# Patient Record
Sex: FEMALE | Race: WHITE | NOT HISPANIC OR LATINO | Employment: OTHER | URBAN - METROPOLITAN AREA
[De-identification: names, ages, dates, MRNs, and addresses within clinical notes are randomized per-mention and may not be internally consistent; named-entity substitution may affect disease eponyms.]

---

## 2017-06-10 ENCOUNTER — COMMUNICATION - HEALTHEAST (OUTPATIENT)
Dept: FAMILY MEDICINE | Facility: CLINIC | Age: 77
End: 2017-06-10

## 2017-07-10 ENCOUNTER — HOSPITAL ENCOUNTER (OUTPATIENT)
Dept: MAMMOGRAPHY | Facility: CLINIC | Age: 77
Discharge: HOME | End: 2017-07-10
Attending: FAMILY MEDICINE

## 2017-07-10 DIAGNOSIS — Z12.31 VISIT FOR SCREENING MAMMOGRAM: ICD-10-CM

## 2017-07-24 ENCOUNTER — OFFICE VISIT - HEALTHEAST (OUTPATIENT)
Dept: FAMILY MEDICINE | Facility: CLINIC | Age: 77
End: 2017-07-24

## 2017-07-24 ENCOUNTER — COMMUNICATION - HEALTHEAST (OUTPATIENT)
Dept: FAMILY MEDICINE | Facility: CLINIC | Age: 77
End: 2017-07-24

## 2017-07-24 DIAGNOSIS — E78.5 HYPERLIPIDEMIA: ICD-10-CM

## 2017-07-24 DIAGNOSIS — M89.9 DISORDER OF BONE AND CARTILAGE: ICD-10-CM

## 2017-07-24 DIAGNOSIS — Z00.00 ROUTINE GENERAL MEDICAL EXAMINATION AT A HEALTH CARE FACILITY: ICD-10-CM

## 2017-07-24 DIAGNOSIS — N30.90 CYSTITIS: ICD-10-CM

## 2017-07-24 DIAGNOSIS — R73.03 PREDIABETES: ICD-10-CM

## 2017-07-24 DIAGNOSIS — M94.9 DISORDER OF BONE AND CARTILAGE: ICD-10-CM

## 2017-07-24 LAB
CHOLEST SERPL-MCNC: 176 MG/DL
FASTING STATUS PATIENT QL REPORTED: YES
HBA1C MFR BLD: 5.5 % (ref 3.5–6)
HDLC SERPL-MCNC: 48 MG/DL
LDLC SERPL CALC-MCNC: 113 MG/DL
TRIGL SERPL-MCNC: 73 MG/DL

## 2017-07-24 ASSESSMENT — MIFFLIN-ST. JEOR: SCORE: 1191.18

## 2017-07-25 ENCOUNTER — COMMUNICATION - HEALTHEAST (OUTPATIENT)
Dept: SCHEDULING | Facility: CLINIC | Age: 77
End: 2017-07-25

## 2017-07-26 ENCOUNTER — COMMUNICATION - HEALTHEAST (OUTPATIENT)
Dept: FAMILY MEDICINE | Facility: CLINIC | Age: 77
End: 2017-07-26

## 2017-07-26 ENCOUNTER — RECORDS - HEALTHEAST (OUTPATIENT)
Dept: ADMINISTRATIVE | Facility: OTHER | Age: 77
End: 2017-07-26

## 2017-07-26 ENCOUNTER — COMMUNICATION - HEALTHEAST (OUTPATIENT)
Dept: SCHEDULING | Facility: CLINIC | Age: 77
End: 2017-07-26

## 2017-07-27 ENCOUNTER — OFFICE VISIT - HEALTHEAST (OUTPATIENT)
Dept: FAMILY MEDICINE | Facility: CLINIC | Age: 77
End: 2017-07-27

## 2017-07-27 DIAGNOSIS — N39.0 URINARY TRACT INFECTION, SITE UNSPECIFIED: ICD-10-CM

## 2017-07-27 ASSESSMENT — MIFFLIN-ST. JEOR: SCORE: 1201.61

## 2018-04-13 ENCOUNTER — COMMUNICATION - HEALTHEAST (OUTPATIENT)
Dept: FAMILY MEDICINE | Facility: CLINIC | Age: 78
End: 2018-04-13

## 2018-04-18 ENCOUNTER — AMBULATORY - HEALTHEAST (OUTPATIENT)
Dept: FAMILY MEDICINE | Facility: CLINIC | Age: 78
End: 2018-04-18

## 2018-04-18 DIAGNOSIS — I83.899: ICD-10-CM

## 2018-05-15 ENCOUNTER — RECORDS - HEALTHEAST (OUTPATIENT)
Dept: ADMINISTRATIVE | Facility: OTHER | Age: 78
End: 2018-05-15

## 2018-06-05 ENCOUNTER — COMMUNICATION - HEALTHEAST (OUTPATIENT)
Dept: VASCULAR SURGERY | Facility: CLINIC | Age: 78
End: 2018-06-05

## 2018-06-05 ENCOUNTER — RECORDS - HEALTHEAST (OUTPATIENT)
Dept: VASCULAR ULTRASOUND | Facility: CLINIC | Age: 78
End: 2018-06-05

## 2018-06-05 ENCOUNTER — AMBULATORY - HEALTHEAST (OUTPATIENT)
Dept: VASCULAR SURGERY | Facility: CLINIC | Age: 78
End: 2018-06-05

## 2018-06-05 ENCOUNTER — OFFICE VISIT - HEALTHEAST (OUTPATIENT)
Dept: VASCULAR SURGERY | Facility: CLINIC | Age: 78
End: 2018-06-05

## 2018-06-05 DIAGNOSIS — I87.2 VENOUS INSUFFICIENCY OF BOTH LOWER EXTREMITIES: ICD-10-CM

## 2018-06-05 DIAGNOSIS — I83.811 VARICOSE VEINS OF RIGHT LOWER EXTREMITY WITH PAIN: ICD-10-CM

## 2018-06-05 DIAGNOSIS — I83.899 VARICOSE VEINS OF UNSPECIFIED LOWER EXTREMITY WITH OTHER COMPLICATIONS: ICD-10-CM

## 2018-06-05 DIAGNOSIS — I87.2 VENOUS INSUFFICIENCY (CHRONIC) (PERIPHERAL): ICD-10-CM

## 2018-06-05 DIAGNOSIS — I83.812 VARICOSE VEINS OF LEFT LOWER EXTREMITY WITH PAIN: ICD-10-CM

## 2018-06-05 DIAGNOSIS — I83.899 SYMPTOMATIC VARICOSE VEINS: ICD-10-CM

## 2018-07-11 ENCOUNTER — HOSPITAL ENCOUNTER (OUTPATIENT)
Dept: MAMMOGRAPHY | Facility: CLINIC | Age: 78
Discharge: HOME | End: 2018-07-11
Attending: FAMILY MEDICINE

## 2018-07-11 DIAGNOSIS — Z12.31 VISIT FOR SCREENING MAMMOGRAM: ICD-10-CM

## 2018-07-20 ENCOUNTER — COMMUNICATION - HEALTHEAST (OUTPATIENT)
Dept: VASCULAR SURGERY | Facility: CLINIC | Age: 78
End: 2018-07-20

## 2018-07-29 ENCOUNTER — COMMUNICATION - HEALTHEAST (OUTPATIENT)
Dept: FAMILY MEDICINE | Facility: CLINIC | Age: 78
End: 2018-07-29

## 2018-07-29 DIAGNOSIS — E78.5 HYPERLIPIDEMIA: ICD-10-CM

## 2018-08-06 ENCOUNTER — OFFICE VISIT - HEALTHEAST (OUTPATIENT)
Dept: FAMILY MEDICINE | Facility: CLINIC | Age: 78
End: 2018-08-06

## 2018-08-06 ENCOUNTER — COMMUNICATION - HEALTHEAST (OUTPATIENT)
Dept: FAMILY MEDICINE | Facility: CLINIC | Age: 78
End: 2018-08-06

## 2018-08-06 DIAGNOSIS — Z00.00 ROUTINE GENERAL MEDICAL EXAMINATION AT A HEALTH CARE FACILITY: ICD-10-CM

## 2018-08-06 DIAGNOSIS — R35.0 URINARY FREQUENCY: ICD-10-CM

## 2018-08-06 DIAGNOSIS — I83.90 VARICOSE VEIN OF LEG: ICD-10-CM

## 2018-08-06 DIAGNOSIS — M94.9 DISORDER OF BONE AND CARTILAGE: ICD-10-CM

## 2018-08-06 DIAGNOSIS — M89.9 DISORDER OF BONE AND CARTILAGE: ICD-10-CM

## 2018-08-06 DIAGNOSIS — R73.03 PREDIABETES: ICD-10-CM

## 2018-08-06 DIAGNOSIS — E78.5 HYPERLIPIDEMIA, UNSPECIFIED HYPERLIPIDEMIA TYPE: ICD-10-CM

## 2018-08-06 DIAGNOSIS — E78.5 HYPERLIPIDEMIA: ICD-10-CM

## 2018-08-06 LAB
ALBUMIN SERPL-MCNC: 4 G/DL (ref 3.5–5)
ALBUMIN UR-MCNC: NEGATIVE MG/DL
ANION GAP SERPL CALCULATED.3IONS-SCNC: 9 MMOL/L (ref 5–18)
APPEARANCE UR: CLEAR
BACTERIA #/AREA URNS HPF: ABNORMAL HPF
BILIRUB UR QL STRIP: NEGATIVE
BUN SERPL-MCNC: 19 MG/DL (ref 8–28)
CALCIUM SERPL-MCNC: 9.6 MG/DL (ref 8.5–10.5)
CHLORIDE BLD-SCNC: 108 MMOL/L (ref 98–107)
CHOLEST SERPL-MCNC: 165 MG/DL
CO2 SERPL-SCNC: 27 MMOL/L (ref 22–31)
COLOR UR AUTO: YELLOW
CREAT SERPL-MCNC: 0.88 MG/DL (ref 0.6–1.1)
FASTING STATUS PATIENT QL REPORTED: YES
GFR SERPL CREATININE-BSD FRML MDRD: >60 ML/MIN/1.73M2
GLUCOSE BLD-MCNC: 88 MG/DL (ref 70–125)
GLUCOSE UR STRIP-MCNC: NEGATIVE MG/DL
HBA1C MFR BLD: 5.8 % (ref 3.5–6)
HDLC SERPL-MCNC: 54 MG/DL
HGB UR QL STRIP: ABNORMAL
KETONES UR STRIP-MCNC: NEGATIVE MG/DL
LDLC SERPL CALC-MCNC: 98 MG/DL
LEUKOCYTE ESTERASE UR QL STRIP: ABNORMAL
MUCOUS THREADS #/AREA URNS LPF: ABNORMAL LPF
NITRATE UR QL: NEGATIVE
PH UR STRIP: 6.5 [PH] (ref 5–8)
PHOSPHATE SERPL-MCNC: 3.1 MG/DL (ref 2.5–4.5)
POTASSIUM BLD-SCNC: 4.1 MMOL/L (ref 3.5–5)
RBC #/AREA URNS AUTO: ABNORMAL HPF
SODIUM SERPL-SCNC: 144 MMOL/L (ref 136–145)
SP GR UR STRIP: 1.01 (ref 1–1.03)
SQUAMOUS #/AREA URNS AUTO: ABNORMAL LPF
TRANS CELLS #/AREA URNS HPF: ABNORMAL LPF
TRIGL SERPL-MCNC: 64 MG/DL
UROBILINOGEN UR STRIP-ACNC: ABNORMAL
WBC #/AREA URNS AUTO: ABNORMAL HPF

## 2018-08-06 ASSESSMENT — MIFFLIN-ST. JEOR: SCORE: 1178.02

## 2018-08-07 LAB
25(OH)D3 SERPL-MCNC: 44 NG/ML (ref 30–80)
25(OH)D3 SERPL-MCNC: 44 NG/ML (ref 30–80)

## 2018-08-09 ENCOUNTER — COMMUNICATION - HEALTHEAST (OUTPATIENT)
Dept: VASCULAR SURGERY | Facility: CLINIC | Age: 78
End: 2018-08-09

## 2018-08-09 LAB — BACTERIA SPEC CULT: ABNORMAL

## 2018-08-10 ENCOUNTER — COMMUNICATION - HEALTHEAST (OUTPATIENT)
Dept: VASCULAR SURGERY | Facility: CLINIC | Age: 78
End: 2018-08-10

## 2018-08-22 ENCOUNTER — COMMUNICATION - HEALTHEAST (OUTPATIENT)
Dept: VASCULAR SURGERY | Facility: CLINIC | Age: 78
End: 2018-08-22

## 2018-08-24 ENCOUNTER — RECORDS - HEALTHEAST (OUTPATIENT)
Dept: ADMINISTRATIVE | Facility: OTHER | Age: 78
End: 2018-08-24

## 2018-08-24 ENCOUNTER — RECORDS - HEALTHEAST (OUTPATIENT)
Dept: BONE DENSITY | Facility: CLINIC | Age: 78
End: 2018-08-24

## 2018-08-24 DIAGNOSIS — M89.9 DISORDER OF BONE, UNSPECIFIED: ICD-10-CM

## 2018-08-24 DIAGNOSIS — M94.9 DISORDER OF CARTILAGE, UNSPECIFIED: ICD-10-CM

## 2018-08-28 ENCOUNTER — RECORDS - HEALTHEAST (OUTPATIENT)
Dept: VASCULAR ULTRASOUND | Facility: CLINIC | Age: 78
End: 2018-08-28

## 2018-08-28 ENCOUNTER — RECORDS - HEALTHEAST (OUTPATIENT)
Dept: ADMINISTRATIVE | Facility: OTHER | Age: 78
End: 2018-08-28

## 2018-08-28 DIAGNOSIS — I83.812 VARICOSE VEINS OF LEFT LOWER EXTREMITY WITH PAIN: ICD-10-CM

## 2018-08-28 DIAGNOSIS — I87.2 VENOUS INSUFFICIENCY (CHRONIC) (PERIPHERAL): ICD-10-CM

## 2018-08-28 DIAGNOSIS — I83.899 VARICOSE VEINS OF UNSPECIFIED LOWER EXTREMITY WITH OTHER COMPLICATIONS: ICD-10-CM

## 2018-08-30 ENCOUNTER — RECORDS - HEALTHEAST (OUTPATIENT)
Dept: VASCULAR ULTRASOUND | Facility: CLINIC | Age: 78
End: 2018-08-30

## 2018-08-30 DIAGNOSIS — I87.2 VENOUS INSUFFICIENCY (CHRONIC) (PERIPHERAL): ICD-10-CM

## 2018-08-30 DIAGNOSIS — I83.899 VARICOSE VEINS OF UNSPECIFIED LOWER EXTREMITY WITH OTHER COMPLICATIONS: ICD-10-CM

## 2018-09-19 ENCOUNTER — OFFICE VISIT - HEALTHEAST (OUTPATIENT)
Dept: VASCULAR SURGERY | Facility: CLINIC | Age: 78
End: 2018-09-19

## 2018-09-19 DIAGNOSIS — I83.899 SYMPTOMATIC VARICOSE VEINS: ICD-10-CM

## 2018-09-19 DIAGNOSIS — I87.2 VENOUS INSUFFICIENCY OF BOTH LOWER EXTREMITIES: ICD-10-CM

## 2018-10-01 ENCOUNTER — OFFICE VISIT - HEALTHEAST (OUTPATIENT)
Dept: FAMILY MEDICINE | Facility: CLINIC | Age: 78
End: 2018-10-01

## 2018-10-01 DIAGNOSIS — M94.9 DISORDER OF BONE AND CARTILAGE: ICD-10-CM

## 2018-10-01 DIAGNOSIS — E78.5 HYPERLIPIDEMIA, UNSPECIFIED HYPERLIPIDEMIA TYPE: ICD-10-CM

## 2018-10-01 DIAGNOSIS — F41.9 ANXIETY: ICD-10-CM

## 2018-10-01 DIAGNOSIS — M89.9 DISORDER OF BONE AND CARTILAGE: ICD-10-CM

## 2018-11-05 ENCOUNTER — RECORDS - HEALTHEAST (OUTPATIENT)
Dept: ADMINISTRATIVE | Facility: OTHER | Age: 78
End: 2018-11-05

## 2019-05-06 ENCOUNTER — OFFICE VISIT - HEALTHEAST (OUTPATIENT)
Dept: FAMILY MEDICINE | Facility: CLINIC | Age: 79
End: 2019-05-06

## 2019-05-06 DIAGNOSIS — M79.662 PAIN OF LEFT LOWER LEG: ICD-10-CM

## 2019-05-06 DIAGNOSIS — M25.511 RIGHT SHOULDER PAIN, UNSPECIFIED CHRONICITY: ICD-10-CM

## 2019-05-06 ASSESSMENT — MIFFLIN-ST. JEOR: SCORE: 1207.51

## 2019-05-13 ENCOUNTER — AMBULATORY - HEALTHEAST (OUTPATIENT)
Dept: OTHER | Facility: CLINIC | Age: 79
End: 2019-05-13

## 2019-05-15 ENCOUNTER — RECORDS - HEALTHEAST (OUTPATIENT)
Dept: ADMINISTRATIVE | Facility: OTHER | Age: 79
End: 2019-05-15

## 2019-06-24 ENCOUNTER — OFFICE VISIT - HEALTHEAST (OUTPATIENT)
Dept: FAMILY MEDICINE | Facility: CLINIC | Age: 79
End: 2019-06-24

## 2019-06-24 DIAGNOSIS — G89.29 CHRONIC RIGHT SHOULDER PAIN: ICD-10-CM

## 2019-06-24 DIAGNOSIS — M25.511 CHRONIC RIGHT SHOULDER PAIN: ICD-10-CM

## 2019-07-01 ENCOUNTER — OFFICE VISIT - HEALTHEAST (OUTPATIENT)
Dept: PHYSICAL THERAPY | Facility: REHABILITATION | Age: 79
End: 2019-07-01

## 2019-07-01 DIAGNOSIS — G89.29 CHRONIC RIGHT SHOULDER PAIN: ICD-10-CM

## 2019-07-01 DIAGNOSIS — M25.511 CHRONIC RIGHT SHOULDER PAIN: ICD-10-CM

## 2019-07-08 ENCOUNTER — OFFICE VISIT - HEALTHEAST (OUTPATIENT)
Dept: PHYSICAL THERAPY | Facility: REHABILITATION | Age: 79
End: 2019-07-08

## 2019-07-08 DIAGNOSIS — G89.29 CHRONIC RIGHT SHOULDER PAIN: ICD-10-CM

## 2019-07-08 DIAGNOSIS — M25.511 CHRONIC RIGHT SHOULDER PAIN: ICD-10-CM

## 2019-07-10 ENCOUNTER — OFFICE VISIT - HEALTHEAST (OUTPATIENT)
Dept: PHYSICAL THERAPY | Facility: REHABILITATION | Age: 79
End: 2019-07-10

## 2019-07-10 DIAGNOSIS — G89.29 CHRONIC RIGHT SHOULDER PAIN: ICD-10-CM

## 2019-07-10 DIAGNOSIS — M25.511 CHRONIC RIGHT SHOULDER PAIN: ICD-10-CM

## 2019-07-15 ENCOUNTER — HOSPITAL ENCOUNTER (OUTPATIENT)
Dept: MAMMOGRAPHY | Facility: CLINIC | Age: 79
Discharge: HOME | End: 2019-07-15
Attending: FAMILY MEDICINE

## 2019-07-15 DIAGNOSIS — Z12.31 VISIT FOR SCREENING MAMMOGRAM: ICD-10-CM

## 2019-08-05 ENCOUNTER — OFFICE VISIT - HEALTHEAST (OUTPATIENT)
Dept: PHYSICAL THERAPY | Facility: REHABILITATION | Age: 79
End: 2019-08-05

## 2019-08-05 DIAGNOSIS — G89.29 CHRONIC RIGHT SHOULDER PAIN: ICD-10-CM

## 2019-08-05 DIAGNOSIS — M25.511 CHRONIC RIGHT SHOULDER PAIN: ICD-10-CM

## 2019-08-07 ENCOUNTER — COMMUNICATION - HEALTHEAST (OUTPATIENT)
Dept: FAMILY MEDICINE | Facility: CLINIC | Age: 79
End: 2019-08-07

## 2019-08-08 ENCOUNTER — RECORDS - HEALTHEAST (OUTPATIENT)
Dept: ADMINISTRATIVE | Facility: OTHER | Age: 79
End: 2019-08-08

## 2019-08-12 ENCOUNTER — RECORDS - HEALTHEAST (OUTPATIENT)
Dept: GENERAL RADIOLOGY | Facility: CLINIC | Age: 79
End: 2019-08-12

## 2019-08-12 ENCOUNTER — OFFICE VISIT - HEALTHEAST (OUTPATIENT)
Dept: FAMILY MEDICINE | Facility: CLINIC | Age: 79
End: 2019-08-12

## 2019-08-12 DIAGNOSIS — N39.41 URGENCY INCONTINENCE: ICD-10-CM

## 2019-08-12 DIAGNOSIS — R73.03 PREDIABETES: ICD-10-CM

## 2019-08-12 DIAGNOSIS — M25.511 CHRONIC RIGHT SHOULDER PAIN: ICD-10-CM

## 2019-08-12 DIAGNOSIS — M94.9 DISORDER OF BONE AND CARTILAGE: ICD-10-CM

## 2019-08-12 DIAGNOSIS — Z00.00 ROUTINE GENERAL MEDICAL EXAMINATION AT A HEALTH CARE FACILITY: ICD-10-CM

## 2019-08-12 DIAGNOSIS — M89.9 DISORDER OF BONE AND CARTILAGE: ICD-10-CM

## 2019-08-12 DIAGNOSIS — M25.511 PAIN IN RIGHT SHOULDER: ICD-10-CM

## 2019-08-12 DIAGNOSIS — E78.5 HYPERLIPIDEMIA, UNSPECIFIED HYPERLIPIDEMIA TYPE: ICD-10-CM

## 2019-08-12 DIAGNOSIS — G89.29 CHRONIC RIGHT SHOULDER PAIN: ICD-10-CM

## 2019-08-12 DIAGNOSIS — G89.29 OTHER CHRONIC PAIN: ICD-10-CM

## 2019-08-12 LAB
ALBUMIN SERPL-MCNC: 3.9 G/DL (ref 3.5–5)
ANION GAP SERPL CALCULATED.3IONS-SCNC: 6 MMOL/L (ref 5–18)
BUN SERPL-MCNC: 19 MG/DL (ref 8–28)
CALCIUM SERPL-MCNC: 9.8 MG/DL (ref 8.5–10.5)
CHLORIDE BLD-SCNC: 108 MMOL/L (ref 98–107)
CHOLEST SERPL-MCNC: 167 MG/DL
CO2 SERPL-SCNC: 31 MMOL/L (ref 22–31)
CREAT SERPL-MCNC: 0.93 MG/DL (ref 0.6–1.1)
FASTING STATUS PATIENT QL REPORTED: YES
GFR SERPL CREATININE-BSD FRML MDRD: 58 ML/MIN/1.73M2
GLUCOSE BLD-MCNC: 93 MG/DL (ref 70–125)
HBA1C MFR BLD: 5.7 % (ref 3.5–6)
HDLC SERPL-MCNC: 57 MG/DL
LDLC SERPL CALC-MCNC: 99 MG/DL
PHOSPHATE SERPL-MCNC: 3.2 MG/DL (ref 2.5–4.5)
POTASSIUM BLD-SCNC: 4 MMOL/L (ref 3.5–5)
SODIUM SERPL-SCNC: 145 MMOL/L (ref 136–145)
TRIGL SERPL-MCNC: 53 MG/DL

## 2019-08-12 ASSESSMENT — MIFFLIN-ST. JEOR: SCORE: 1202.97

## 2019-08-14 ENCOUNTER — OFFICE VISIT - HEALTHEAST (OUTPATIENT)
Dept: PHYSICAL THERAPY | Facility: REHABILITATION | Age: 79
End: 2019-08-14

## 2019-08-14 DIAGNOSIS — G89.29 CHRONIC RIGHT SHOULDER PAIN: ICD-10-CM

## 2019-08-14 DIAGNOSIS — M25.511 CHRONIC RIGHT SHOULDER PAIN: ICD-10-CM

## 2019-08-21 ENCOUNTER — HOSPITAL ENCOUNTER (OUTPATIENT)
Dept: RADIOLOGY | Facility: CLINIC | Age: 79
Discharge: HOME | End: 2019-08-21
Attending: FAMILY MEDICINE

## 2019-08-21 ENCOUNTER — OFFICE VISIT - HEALTHEAST (OUTPATIENT)
Dept: FAMILY MEDICINE | Facility: CLINIC | Age: 79
End: 2019-08-21

## 2019-08-21 DIAGNOSIS — M25.562 ACUTE PAIN OF LEFT KNEE: ICD-10-CM

## 2019-08-22 ENCOUNTER — OFFICE VISIT - HEALTHEAST (OUTPATIENT)
Dept: FAMILY MEDICINE | Facility: CLINIC | Age: 79
End: 2019-08-22

## 2019-08-22 DIAGNOSIS — M17.10 ARTHRITIS OF KNEE: ICD-10-CM

## 2019-09-03 ENCOUNTER — COMMUNICATION - HEALTHEAST (OUTPATIENT)
Dept: FAMILY MEDICINE | Facility: CLINIC | Age: 79
End: 2019-09-03

## 2019-09-03 ENCOUNTER — AMBULATORY - HEALTHEAST (OUTPATIENT)
Dept: FAMILY MEDICINE | Facility: CLINIC | Age: 79
End: 2019-09-03

## 2019-09-03 DIAGNOSIS — M25.562 LEFT KNEE PAIN, UNSPECIFIED CHRONICITY: ICD-10-CM

## 2019-09-10 ENCOUNTER — COMMUNICATION - HEALTHEAST (OUTPATIENT)
Dept: FAMILY MEDICINE | Facility: CLINIC | Age: 79
End: 2019-09-10

## 2019-09-10 ENCOUNTER — AMBULATORY - HEALTHEAST (OUTPATIENT)
Dept: FAMILY MEDICINE | Facility: CLINIC | Age: 79
End: 2019-09-10

## 2019-09-10 ENCOUNTER — RECORDS - HEALTHEAST (OUTPATIENT)
Dept: ADMINISTRATIVE | Facility: OTHER | Age: 79
End: 2019-09-10

## 2019-09-10 ENCOUNTER — RECORDS - HEALTHEAST (OUTPATIENT)
Dept: BONE DENSITY | Facility: CLINIC | Age: 79
End: 2019-09-10

## 2019-09-10 ENCOUNTER — HOSPITAL ENCOUNTER (OUTPATIENT)
Dept: MRI IMAGING | Facility: CLINIC | Age: 79
Discharge: HOME | End: 2019-09-10
Attending: FAMILY MEDICINE

## 2019-09-10 DIAGNOSIS — N39.41 URGENCY INCONTINENCE: ICD-10-CM

## 2019-09-10 DIAGNOSIS — M94.9 DISORDER OF CARTILAGE, UNSPECIFIED: ICD-10-CM

## 2019-09-10 DIAGNOSIS — M89.9 DISORDER OF BONE, UNSPECIFIED: ICD-10-CM

## 2019-09-10 DIAGNOSIS — M25.562 LEFT KNEE PAIN, UNSPECIFIED CHRONICITY: ICD-10-CM

## 2019-09-10 DIAGNOSIS — M79.662 PAIN OF LEFT LOWER LEG: ICD-10-CM

## 2019-09-16 ENCOUNTER — RECORDS - HEALTHEAST (OUTPATIENT)
Dept: ADMINISTRATIVE | Facility: OTHER | Age: 79
End: 2019-09-16

## 2019-09-17 ENCOUNTER — COMMUNICATION - HEALTHEAST (OUTPATIENT)
Dept: FAMILY MEDICINE | Facility: CLINIC | Age: 79
End: 2019-09-17

## 2019-09-19 ENCOUNTER — RECORDS - HEALTHEAST (OUTPATIENT)
Dept: VASCULAR ULTRASOUND | Facility: CLINIC | Age: 79
End: 2019-09-19

## 2019-09-19 DIAGNOSIS — M89.8X6 OTHER SPECIFIED DISORDERS OF BONE, LOWER LEG: ICD-10-CM

## 2019-09-19 DIAGNOSIS — M79.669 PAIN IN UNSPECIFIED LOWER LEG: ICD-10-CM

## 2019-09-30 ENCOUNTER — AMBULATORY - HEALTHEAST (OUTPATIENT)
Dept: OTHER | Facility: CLINIC | Age: 79
End: 2019-09-30

## 2019-10-16 ENCOUNTER — AMBULATORY - HEALTHEAST (OUTPATIENT)
Dept: NURSING | Facility: CLINIC | Age: 79
End: 2019-10-16

## 2019-10-16 DIAGNOSIS — Z23 FLU VACCINE NEED: ICD-10-CM

## 2020-06-01 ENCOUNTER — RECORDS - HEALTHEAST (OUTPATIENT)
Dept: ADMINISTRATIVE | Facility: OTHER | Age: 80
End: 2020-06-01

## 2020-07-06 ENCOUNTER — OFFICE VISIT - HEALTHEAST (OUTPATIENT)
Dept: FAMILY MEDICINE | Facility: CLINIC | Age: 80
End: 2020-07-06

## 2020-07-06 DIAGNOSIS — R73.03 PREDIABETES: ICD-10-CM

## 2020-07-06 DIAGNOSIS — M89.9 DISORDER OF BONE AND CARTILAGE: ICD-10-CM

## 2020-07-06 DIAGNOSIS — E78.5 HYPERLIPIDEMIA, UNSPECIFIED HYPERLIPIDEMIA TYPE: ICD-10-CM

## 2020-07-06 DIAGNOSIS — N81.11 MIDLINE CYSTOCELE: ICD-10-CM

## 2020-07-06 DIAGNOSIS — M94.9 DISORDER OF BONE AND CARTILAGE: ICD-10-CM

## 2020-07-06 DIAGNOSIS — H90.3 BILATERAL SENSORINEURAL HEARING LOSS: ICD-10-CM

## 2020-07-06 LAB
ALBUMIN SERPL-MCNC: 3.9 G/DL (ref 3.5–5)
ANION GAP SERPL CALCULATED.3IONS-SCNC: 10 MMOL/L (ref 5–18)
BUN SERPL-MCNC: 23 MG/DL (ref 8–28)
CALCIUM SERPL-MCNC: 9.1 MG/DL (ref 8.5–10.5)
CHLORIDE BLD-SCNC: 106 MMOL/L (ref 98–107)
CHOLEST SERPL-MCNC: 158 MG/DL
CO2 SERPL-SCNC: 27 MMOL/L (ref 22–31)
CREAT SERPL-MCNC: 0.96 MG/DL (ref 0.6–1.1)
FASTING STATUS PATIENT QL REPORTED: YES
GFR SERPL CREATININE-BSD FRML MDRD: 56 ML/MIN/1.73M2
GLUCOSE BLD-MCNC: 90 MG/DL (ref 70–125)
HBA1C MFR BLD: 5.6 % (ref 3.5–6)
HDLC SERPL-MCNC: 51 MG/DL
LDLC SERPL CALC-MCNC: 90 MG/DL
PHOSPHATE SERPL-MCNC: 3.1 MG/DL (ref 2.5–4.5)
POTASSIUM BLD-SCNC: 4.1 MMOL/L (ref 3.5–5)
SODIUM SERPL-SCNC: 143 MMOL/L (ref 136–145)
TRIGL SERPL-MCNC: 84 MG/DL

## 2020-07-17 ENCOUNTER — HOSPITAL ENCOUNTER (OUTPATIENT)
Dept: MAMMOGRAPHY | Facility: CLINIC | Age: 80
Discharge: HOME | End: 2020-07-17
Attending: FAMILY MEDICINE

## 2020-07-17 DIAGNOSIS — Z12.31 VISIT FOR SCREENING MAMMOGRAM: ICD-10-CM

## 2020-07-21 ENCOUNTER — OFFICE VISIT - HEALTHEAST (OUTPATIENT)
Dept: AUDIOLOGY | Facility: CLINIC | Age: 80
End: 2020-07-21

## 2020-07-21 ENCOUNTER — COMMUNICATION - HEALTHEAST (OUTPATIENT)
Dept: ADMINISTRATIVE | Facility: CLINIC | Age: 80
End: 2020-07-21

## 2020-07-21 DIAGNOSIS — H90.A31 MIXED CONDUCTIVE AND SENSORINEURAL HEARING LOSS OF RIGHT EAR WITH RESTRICTED HEARING OF LEFT EAR: ICD-10-CM

## 2020-07-30 ENCOUNTER — OFFICE VISIT - HEALTHEAST (OUTPATIENT)
Dept: OTOLARYNGOLOGY | Facility: CLINIC | Age: 80
End: 2020-07-30

## 2020-07-30 DIAGNOSIS — H90.3 SENSORINEURAL HEARING LOSS (SNHL) OF BOTH EARS: ICD-10-CM

## 2020-07-30 DIAGNOSIS — H90.8 MIXED HEARING LOSS, UNILATERAL: ICD-10-CM

## 2020-08-27 ENCOUNTER — OFFICE VISIT - HEALTHEAST (OUTPATIENT)
Dept: FAMILY MEDICINE | Facility: CLINIC | Age: 80
End: 2020-08-27

## 2020-08-27 DIAGNOSIS — M89.9 DISORDER OF BONE AND CARTILAGE: ICD-10-CM

## 2020-08-27 DIAGNOSIS — Z00.00 ROUTINE GENERAL MEDICAL EXAMINATION AT A HEALTH CARE FACILITY: ICD-10-CM

## 2020-08-27 DIAGNOSIS — R73.03 PREDIABETES: ICD-10-CM

## 2020-08-27 DIAGNOSIS — M94.9 DISORDER OF BONE AND CARTILAGE: ICD-10-CM

## 2020-08-27 DIAGNOSIS — E78.5 HYPERLIPIDEMIA, UNSPECIFIED HYPERLIPIDEMIA TYPE: ICD-10-CM

## 2020-08-27 ASSESSMENT — MIFFLIN-ST. JEOR: SCORE: 1193.9

## 2020-08-27 NOTE — ASSESSMENT & PLAN NOTE
Continues Fosamax 70mg weekly; taking since 2018. Last Dexa 2019 showing a good response. Continue medication.

## 2020-10-12 ENCOUNTER — OFFICE VISIT - HEALTHEAST (OUTPATIENT)
Dept: FAMILY MEDICINE | Facility: CLINIC | Age: 80
End: 2020-10-12

## 2020-10-12 DIAGNOSIS — N94.9 ADNEXAL FULLNESS: ICD-10-CM

## 2020-10-12 DIAGNOSIS — N81.11 CYSTOCELE, MIDLINE: ICD-10-CM

## 2020-10-13 ENCOUNTER — HOSPITAL ENCOUNTER (OUTPATIENT)
Dept: ULTRASOUND IMAGING | Facility: CLINIC | Age: 80
Discharge: HOME | End: 2020-10-13
Attending: FAMILY MEDICINE

## 2020-10-13 DIAGNOSIS — N94.9 ADNEXAL FULLNESS: ICD-10-CM

## 2021-03-13 ENCOUNTER — COMMUNICATION - HEALTHEAST (OUTPATIENT)
Dept: FAMILY MEDICINE | Facility: CLINIC | Age: 81
End: 2021-03-13

## 2021-03-13 ENCOUNTER — COMMUNICATION - HEALTHEAST (OUTPATIENT)
Dept: TELEHEALTH | Facility: CLINIC | Age: 81
End: 2021-03-13

## 2021-03-22 ENCOUNTER — RECORDS - HEALTHEAST (OUTPATIENT)
Dept: FAMILY MEDICINE | Facility: CLINIC | Age: 81
End: 2021-03-22

## 2021-03-22 DIAGNOSIS — Z12.31 VISIT FOR SCREENING MAMMOGRAM: ICD-10-CM

## 2021-05-17 ENCOUNTER — OFFICE VISIT - HEALTHEAST (OUTPATIENT)
Dept: FAMILY MEDICINE | Facility: CLINIC | Age: 81
End: 2021-05-17

## 2021-05-17 DIAGNOSIS — M89.9 DISORDER OF BONE AND CARTILAGE: ICD-10-CM

## 2021-05-17 DIAGNOSIS — J30.2 SEASONAL ALLERGIC RHINITIS, UNSPECIFIED TRIGGER: ICD-10-CM

## 2021-05-17 DIAGNOSIS — B35.1 ONYCHOMYCOSIS: ICD-10-CM

## 2021-05-17 DIAGNOSIS — L84 CALLUS OF FOOT: ICD-10-CM

## 2021-05-17 DIAGNOSIS — M94.9 DISORDER OF BONE AND CARTILAGE: ICD-10-CM

## 2021-05-17 DIAGNOSIS — E78.5 HYPERLIPIDEMIA, UNSPECIFIED HYPERLIPIDEMIA TYPE: ICD-10-CM

## 2021-05-17 RX ORDER — ALENDRONATE SODIUM 70 MG/1
70 TABLET ORAL
Qty: 12 TABLET | Refills: 3 | Status: SHIPPED | OUTPATIENT
Start: 2021-05-17 | End: 2022-05-02

## 2021-05-17 RX ORDER — SIMVASTATIN 10 MG
10 TABLET ORAL DAILY
Qty: 90 TABLET | Refills: 3 | Status: SHIPPED | OUTPATIENT
Start: 2021-05-17 | End: 2022-05-02

## 2021-05-21 ENCOUNTER — RECORDS - HEALTHEAST (OUTPATIENT)
Dept: ADMINISTRATIVE | Facility: OTHER | Age: 81
End: 2021-05-21

## 2021-05-25 ENCOUNTER — RECORDS - HEALTHEAST (OUTPATIENT)
Dept: ADMINISTRATIVE | Facility: CLINIC | Age: 81
End: 2021-05-25

## 2021-05-26 ENCOUNTER — RECORDS - HEALTHEAST (OUTPATIENT)
Dept: ADMINISTRATIVE | Facility: CLINIC | Age: 81
End: 2021-05-26

## 2021-05-27 ENCOUNTER — RECORDS - HEALTHEAST (OUTPATIENT)
Dept: ADMINISTRATIVE | Facility: CLINIC | Age: 81
End: 2021-05-27

## 2021-05-27 VITALS
SYSTOLIC BLOOD PRESSURE: 122 MMHG | OXYGEN SATURATION: 98 % | WEIGHT: 160.8 LBS | HEART RATE: 77 BPM | DIASTOLIC BLOOD PRESSURE: 78 MMHG

## 2021-05-28 ENCOUNTER — RECORDS - HEALTHEAST (OUTPATIENT)
Dept: ADMINISTRATIVE | Facility: CLINIC | Age: 81
End: 2021-05-28

## 2021-05-28 NOTE — PROGRESS NOTES
"Assessment:     1. Right shoulder pain, unspecified chronicity     2. Pain of left lower leg         Plan:     Kalpana is a 79-year-old female presenting today with right shoulder arthritis questions.  I explained that we treat this symptomatically and since her symptoms seem to have resolved at this point, I would not recommend any interventions and I would discontinue her diclofenac cream.  I also would not work-up the left lower leg discomfort any further since it is so intermittent and not frequent.  Follow-up for an annual exam in the near future.    Subjective:       79 y.o. female presents today with a couple of questions.  The patient and her  were in [place] most of the winter and just came back in the last few days.  The patient states that she was seen for right shoulder pain and was diagnosed with arthritis when she was down there.  However, about 6 weeks ago she moved her arm in a funny position and felt and heard a snap and shortly thereafter the symptoms seem to improve and she has been basically pain-free since that time.  She wondered what would potentially explain this.  The patient also notes intermittent although not frequent achiness in her left lower leg that seems to come on only at night but not every night.  She denies any associated swelling or other findings.  She is otherwise doing reasonably well without any other complaints or concerns.      Reviewed: The following portions of the patient's history were reviewed and updated as appropriate: allergies, current medications, past family history, past medical history, past social history, past surgical history and problem list.    Review of Systems  Pertinent items are noted in HPI.        Objective:     /74 (Patient Site: Left Arm, Patient Position: Sitting, Cuff Size: Adult Regular)   Pulse 74   Ht 5' 6\" (1.676 m)   Wt 160 lb (72.6 kg)   LMP  (LMP Unknown)   BMI 25.82 kg/m    General appearance: alert, appears stated age and " cooperative      This note has been dictated using voice recognition software. Any grammatical or context distortions are unintentional and inherent to the software

## 2021-05-29 ENCOUNTER — RECORDS - HEALTHEAST (OUTPATIENT)
Dept: ADMINISTRATIVE | Facility: CLINIC | Age: 81
End: 2021-05-29

## 2021-05-29 NOTE — PROGRESS NOTES
Assessment:     1. Chronic right shoulder pain  Ambulatory referral to PT/OT       Plan:     Kalpana is a 79-year-old female presenting today with chronic mild right shoulder pain predominantly symptomatic at night.  I recommended a referral to physical therapy based on a physical examination that points towards the proximal triceps tendinitis as a cause for her pain.  I will follow-up with her in August at her annual exam regarding symptoms.    Subjective:       79 y.o. female presents for evaluation of the right shoulder. She reports issues with this for the past approximately one year and did seek care for this in [place] this winter. She did have an xray taken and was told that she had arthritis. She does have a history of frozen shoulder years ago and required intervention on that. It does not hurt during the day but bothers her at night when she is sleeping on that side.       Reviewed: The following portions of the patient's history were reviewed and updated as appropriate: allergies, current medications, past family history, past medical history, past social history, past surgical history and problem list.    Review of Systems  Pertinent items are noted in HPI.        Objective:     /74 (Patient Site: Left Arm, Patient Position: Sitting, Cuff Size: Adult Regular)   Pulse 68   Wt 158 lb (71.7 kg)   LMP  (LMP Unknown)   BMI 25.50 kg/m    General appearance: alert, appears stated age and cooperative  Right shoulder: Normal appearance without swelling or joint effusion. No tenderness over AC joint or shoulder joint.  Normal range of motion about the right shoulder.  The patient does have some localized tenderness over the proximal triceps muscle and tendon.      This note has been dictated using voice recognition software. Any grammatical or context distortions are unintentional and inherent to the software

## 2021-05-30 ENCOUNTER — RECORDS - HEALTHEAST (OUTPATIENT)
Dept: ADMINISTRATIVE | Facility: CLINIC | Age: 81
End: 2021-05-30

## 2021-05-30 NOTE — PROGRESS NOTES
[facility] Daily Progress     Patient Name: Kalpana Manzo  Date: 7/8/2019  Visit #: 2/10  Authorization dates:  7/1/19-9/29/19  Referral Diagnosis: Chronic right shoulder pain  Referring provider: Mela López MD  Visit Diagnosis:     ICD-10-CM    1. Chronic right shoulder pain M25.511     G89.29        Precautions / Restrictions : skin cancer, hepatitis, hyperlipidemia, knee OA, osteopenia, prediabetes       Assessment:     Response to Intervention:  Tolerated manual therapy well without soreness.  Scapular mechanics improving since last visit.  Patient has good compliance and     Symptoms are consistent with myofascial pain and possible peripheral nerve impingement.  Patient is appropriate to continue with skilled physical therapy intervention, as indicated by initial plan of care.    Goal Status:  Pt. will demonstrate/verbalize independence in self-management of condition in : 6 weeks  Pt. will be independent with home exercise program in : 6 weeks  Pt. will improve posture : and demonstrate posture with minimal to no cuing;in 6 weeks  Patient will reach / maintain arm movement: forward;overhead;behind;for home chores;for dressing;with no pain;with less difficulty;in 6 weeks    No data recorded  Other functional progress:           Plan / Patient Education:     Continue with initial plan of care.  Progress with home program as tolerated.  Band exercises, arm bike.    Subjective:     Pain Rating:  Resting 0  Activity:  3    Response to last treatment: felt Ok.  No increased soreness.  HEP- Frequency: 1-2x/day, Questions or difficulties:  none.    Patient reports:      She has longer periods of time where she is pain free.    The exercises are helping.      Objective:       Less compensation with AROM today than at evaluation.      Treatment Today   Manual Therapy  Manual therapy:  right shoulder inferior mobilization    Rate/grade Target  Direction  Relative movement Location in range Patient  "position   2 Humeral head Inferior  Inferior glide of humeral head on glenoid. Varying degrees of shoulder abduction from neutral to end-range. Supine       Manual therapy:  right shoulder posterior mobilization    Rate/grade Target  Direction  Relative movement Location in range Patient position   2 Humeral head Posterior  Posterior glide of humeral head on glenoid. 90 degrees of shoulder abduction and in varying degrees of IR from neutral to end-range. Supine       MFR layers 1-3 right: upper tap, deltoid, pec major/minor, lat dorsi, teres major    Exercises:  Exercise #1: posterior shoulder stretch  Comment #1: 30\" x 2 bilateral  Exercise #2: inferior shoulder stretch  Comment #2: 30\" x 2   Exercise #3: pec stretch  Comment #3:  30\" x 2             TREATMENT MINUTES COMMENTS   Evaluation     Self-care/ Home management     Manual therapy 25 See above.   Neuromuscular Re-education     Therapeutic Activity     Therapeutic Exercises 3 See exercise flow-sheet for details.    Gait training     Modality__________________                Total 28    Blank areas are intentional and mean the treatment did not include these items.       Andrés Caba, PT  7/8/2019     "

## 2021-05-30 NOTE — PROGRESS NOTES
"[facility] Daily Progress     Patient Name: Kalpana Manzo  Date: 7/10/2019  Visit #: 3/10  Authorization dates:  7/1/19-9/29/19  Referral Diagnosis: Chronic right shoulder pain  Referring provider: Mela López MD  Visit Diagnosis:     ICD-10-CM    1. Chronic right shoulder pain M25.511     G89.29        Precautions / Restrictions : skin cancer, hepatitis, hyperlipidemia, knee OA, osteopenia, prediabetes       Assessment:     Response to Intervention:  Tolerated manual therapy well without soreness.  Scapular mechanics improving since last visit.  Patient has good compliance and     Symptoms are consistent with myofascial pain and possible peripheral nerve impingement.  Patient is appropriate to continue with skilled physical therapy intervention, as indicated by initial plan of care.    Goal Status:  Pt. will demonstrate/verbalize independence in self-management of condition in : 6 weeks;Progressing toward  Pt. will be independent with home exercise program in : 6 weeks;Progressing toward  Pt. will improve posture : and demonstrate posture with minimal to no cuing;in 6 weeks;Progressing toward  Patient will reach / maintain arm movement: forward;overhead;behind;for home chores;for dressing;with no pain;with less difficulty;in 6 weeks;Progressing toward    No data recorded  Other functional progress:           Plan / Patient Education:     Continue with initial plan of care.  Progress with home program as tolerated.  Band exercises, arm bike.    Subjective:     Pain Rating:  Resting 0  Activity:  0    Response to last treatment: felt Ok.  No pain.  HEP- Frequency: 1-2x/day, Questions or difficulties:  none.    Patient reports:      She is \"feeling really, pretty good\".    No pain today.    70% overall improvement since starting physical therapy.      Objective:       Less compensation with AROM today than at evaluation.      Treatment Today   Manual Therapy  Manual therapy:  right shoulder " "inferior mobilization    Rate/grade Target  Direction  Relative movement Location in range Patient position   2 Humeral head Inferior  Inferior glide of humeral head on glenoid. Varying degrees of shoulder abduction from neutral to end-range. Supine       Manual therapy:  right shoulder posterior mobilization    Rate/grade Target  Direction  Relative movement Location in range Patient position   2 Humeral head Posterior  Posterior glide of humeral head on glenoid. 90 degrees of shoulder abduction and in varying degrees of IR from neutral to end-range. Supine       MFR layers 1-3 right: upper tap, deltoid, pec major/minor, lat dorsi, teres major    Exercises:  Exercise #1: posterior shoulder stretch  Comment #1: 30\" x 2 bilateral  Exercise #2: inferior shoulder stretch  Comment #2: 30\" x 2   Exercise #3: pec stretch  Comment #3:  30\" x 2             TREATMENT MINUTES COMMENTS   Evaluation     Self-care/ Home management     Manual therapy 25 See above.   Neuromuscular Re-education     Therapeutic Activity     Therapeutic Exercises 3 See exercise flow-sheet for details.    Gait training     Modality__________________                Total 28    Blank areas are intentional and mean the treatment did not include these items.       Andrés Caba, PT  7/10/2019     "

## 2021-05-30 NOTE — PROGRESS NOTES
[facility] Certification Request    July 3, 2019      Patient: Kalpana Manzo  MR Number: [id number]  YOB: 1940  Date of Visit: 7/1/2019      Dear Dr. López, Mela SERRANO MD:    Thank you for this referral.   We are seeing Kalpana Manzo in Physical Therapy for Chronic right shoulder pain.    Medicare and/or Medicaid requires physician review and approval of the treatment plan. Please review the plan of care and verify that you agree with the therapy plan of care by co-signing this note.      Plan of Care  Authorization / Certification Start Date: 07/01/19  Authorization / Certification End Date: 09/29/19  Authorization / Certification Number of Visits: 10  Communication with: Referral Source  Patient Related Instruction: Nature of Condition;Basis of treatment;Expected outcome;Body mechanics;Treatment plan and rationale;Posture;Precautions;Self Care instruction;Next steps  Times per Week: 2-1  Number of Weeks: 12  Number of Visits: 10  Discharge Planning: to include HEP  Precautions / Restrictions : skin cancer, hepatitis, hyperlipidemia, knee OA, osteopenia, prediabetes  Therapeutic Exercise: ROM;Stretching;Strengthening  Neuromuscular Reeducation: posture;TNE;core  Manual Therapy: soft tissue mobilization;myofascial release;joint mobilization  Modalities: cold pack;hot pack      Goals:  Pt. will demonstrate/verbalize independence in self-management of condition in : 6 weeks  Pt. will be independent with home exercise program in : 6 weeks  Pt. will improve posture : and demonstrate posture with minimal to no cuing;in 6 weeks  Patient will reach / maintain arm movement: forward;overhead;behind;for home chores;for dressing;with no pain;with less difficulty;in 6 weeks    No data recorded      If you have any questions or concerns, please don't hesitate to call.    Sincerely,      Andrés Caba, PT      Physician:    For Medicare/MA patients:      Physician recommendation:     ___  Follow therapist's recommendation        ___ Modify therapy      *Physician co-signature indicates they certify the need for these services furnished within this plan and while under their care.       [facility]   Shoulder Initial Evaluation    Patient Name: Kalpana Manzo  Date of evaluation: 7/1/2019  Referral Diagnosis: Chronic right shoulder pain  Referring provider: Mela López MD  Visit Diagnosis:     ICD-10-CM    1. Chronic right shoulder pain M25.511     G89.29        Precautions / Restrictions : skin cancer, hepatitis, hyperlipidemia, knee OA, osteopenia, prediabetes       Assessment:      Impairments in  pain, posture, ROM, joint mobility, strength  Patient's signs and symptoms are consistent with myofascial pain and possible peripheral nerve impingement.  Patient responded well to manual therapy and exercises.  Prognosis to achieve goals is  good   Pt. is appropriate for skilled PT intervention as outlined in the Plan of Care (POC).    Goals:  Pt. will demonstrate/verbalize independence in self-management of condition in : 6 weeks  Pt. will be independent with home exercise program in : 6 weeks  Pt. will improve posture : and demonstrate posture with minimal to no cuing;in 6 weeks  Patient will reach / maintain arm movement: forward;overhead;behind;for home chores;for dressing;with no pain;with less difficulty;in 6 weeks    No data recorded    Patient's expectations/goals are realistic.    Barriers to Learning or Achieving Goals:  No Barriers.       Plan / Patient Instructions:        Plan of Care:   Authorization / Certification Start Date: 07/01/19  Authorization / Certification End Date: 09/29/19  Authorization / Certification Number of Visits: 10  Communication with: Referral Source  Patient Related Instruction: Nature of Condition;Basis of treatment;Expected outcome;Body mechanics;Treatment plan and rationale;Posture;Precautions;Self Care instruction;Next steps  Times per Week:  2-1  Number of Weeks: 12  Number of Visits: 10  Discharge Planning: to include HEP  Precautions / Restrictions : skin cancer, hepatitis, hyperlipidemia, knee OA, osteopenia, prediabetes  Therapeutic Exercise: ROM;Stretching;Strengthening  Neuromuscular Reeducation: posture;TNE;core  Manual Therapy: soft tissue mobilization;myofascial release;joint mobilization  Modalities: cold pack;hot pack      Pt. is in agreement with the Plan of Care  A Home Exercise Program (HEP) was initiated today.  Pt. was instructed in exercises by PT and patient was given a handout with detailed instructions.  Plan for next visit: review HEP  .   Subjective:           History of Present Illness:    Kalpana is a 79 y.o. female who presents to therapy today with complaints of intermittent lateral arm and shoulder pain. Date of onset/duration of symptoms is 2019. Onset was gradual. Symptoms are intermittent. She denies history of similar symptoms. She describes their previous level of function as not limited    Pain Ratin  Pain rating at best: 0  Pain rating at worst: 4  Pain description: pain and sharp    Functional limitations are described as occurring with:   reaching at shoulder height, overhead and behind back         Objective:      Note: Items left blank indicates the item was not performed or not indicated at the time of the evaluation.    Patient Outcome Measures :    No data recorded   Scores range from 0-100%, where a score of 0% represents minimal pain and maximal function. The minimal clincically important difference is a score reduction of 10%.    Shoulder Examination  1. Chronic right shoulder pain       Involved side: Right  Posture Observation:      General sitting posture is  fair.  General standing posture is fair.  Cervical:  Mild forward head  Shoulder/Thoracic complex: Moderate bilateral scapular protraction   Mildly increased CT junction thoracic kyphosis  Cervical Clearing: Normal    Upper Extremity  "ROM/Strength:           Right           Left     * indicates pain   AROM   PROM   MMT/5   AROM   PROM   MMT/5     Shoulder Flexion 180     WNL      4              Shoulder Extension  60                        Shoulder Abduction  180    WNL      4              Shoulder ER  70  @ side WNL  4        Shoulder IR/Ext reach  T3 WNL  4        Shoulder GH ER  90            Shoulder GH IR  70            Elbow Flexion  150    WNL                    Elbow Extension 0    WNL                      Flexibility & Palpation: limited pec minor, deltoid, upper trap, biceps,    Joint Assessment:  Scapulothoracic Joint Assessment: Hypomobile.  Glenohumeral Joint Assessment:Hypomobile.    Shoulder Special Tests      Impingement Cluster Right (+/-) Left (+/-) Rotator Cuff Tests Right (+/-) Left (+/-)   Piper-Faheem -  Drop Arm Sign     Painful Arc -  Hornblowers     Infraspinatus Test -  ERLS     AC Tests Right (+/-) Left (+/-) IRLS     Active Compression   Labral Tests Right (+/-) Left (+/-)   Crossbody Adduction   Biceps Load Test II     AC Resisted Extension   Jerk Test     GH Instability Tests Right (+/-) Left (+/-) Colleen Test     Sulcus Sign   Biceps Tests Right (+/-) Left (+/-)   Apprehension   Speed     Relocation   Jolie german     Other:   Other:     Other:   Other:           Treatment Today      Therapeutic Exercises:  Exercise #1: posterior shoulder stretch  Comment #1: 30\" x 2 bilateral  Exercise #2: inferior shoulder stretch  Comment #2: 30\" x 2   Exercise #3: pec stretch  Comment #3:  30\" x 2        Manual therapy:  MFR layers 1-3 right: upper trap, deltoid, pec minor, biceps    TREATMENT MINUTES COMMENTS   Evaluation 30    Self-care/ Home management     Manual therapy 15    Neuromuscular Re-education     Therapeutic Activity     Therapeutic Exercises 15    Gait training     Modality__________________                Total 60    Blank areas are intentional and mean the treatment did not include these items.     PT Evaluation " Code: (Please list factors)  Patient History/Comorbidities:   Patient Active Problem List   Diagnosis     Hepatitis     Basal Cell Carcinoma Of The Skin     Urinary Frequency More Than Twice At Night (Nocturia)     Hyperlipidemia     Onychomycosis     Osteopenia     Osteoarthritis Of The Knee     Eczema     Limb Swelling     Prediabetes     Varicose vein of leg      Past Medical History:   Diagnosis Date     Basal Cell Carcinoma Of The Skin     Created by Conversion  Replacement Utility updated for latest IMO load     Eczema     Created by Conversion      Hepatitis     Created by Conversion [facility] Annotation: Aug 22 2011  9:09AM - Mela López: in the 1980's  unspecified  Replacement Utility updated for latest IMO load     Hyperlipidemia     Created by Conversion      Limb Swelling     Created by Conversion      Onychomycosis     Created by Conversion      Osteoarthritis Of The Knee     Created by Conversion  Replacement Utility updated for latest IMO load     Osteopenia     Created by Conversion  Replacement Utility updated for latest IMO load     Prediabetes 7/18/2016     Urinary Frequency More Than Twice At Night (Nocturia)     Created by Conversion       Examination: as above  Clinical Presentation: stable  Clinical Decision Making: low complexity         Patient History/  Comorbidities Examination  (body structures and functions, activity limitations, and/or participation restrictions) Clinical Presentation Clinical Decision Making (Complexity)   No documented Comorbidities or personal factors 1-2 Elements Stable and/or uncomplicated Low   1-2 documented comorbidities or personal factor 3 Elements Evolving clinical presentation with changing characteristics Moderate   3-4 documented comorbidities or personal factors 4 or more Unstable and unpredictable High              Andrés Caba, PT  7/1/2019  8:22 AM

## 2021-05-31 ENCOUNTER — RECORDS - HEALTHEAST (OUTPATIENT)
Dept: ADMINISTRATIVE | Facility: CLINIC | Age: 81
End: 2021-05-31

## 2021-05-31 VITALS — WEIGHT: 156.4 LBS | HEIGHT: 66 IN | BODY MASS INDEX: 25.13 KG/M2

## 2021-05-31 VITALS — BODY MASS INDEX: 25.51 KG/M2 | WEIGHT: 158.7 LBS | HEIGHT: 66 IN

## 2021-05-31 NOTE — PROGRESS NOTES
Assessment:     1. Acute pain of left knee  XR Knee Left 1 or 2 VWS       Plan:     Kalpana is a 79-year-old with a somewhat unusual history of recent left leg pain.  On examination, the patient's pain seems to really localize right along the lateral joint line on the left side.  I recommended an x-ray of the left knee and will get back to the patient following those results.    Subjective:       79 y.o. female presents for evaluation of a couple week history of left leg pain.  The patient received her shingles vaccine on the seventh of this month and shortly thereafter started to experience bilateral pain in her lower extremities which she described as pain in her buttocks, lateral hips as well as around her knee joints and down towards her ankles.  Was thought that this was probably a reaction to the shingles vaccine and she states that all of her symptoms have improved with the exception of her left leg.  She describes pain especially when she goes upstairs of pain around her left lateral hip that seems to radiate down around her lateral knee.  She describes it as an ache.      Reviewed: The following portions of the patient's history were reviewed and updated as appropriate: allergies, current medications, past family history, past medical history, past social history, past surgical history and problem list.    Review of Systems  Pertinent items are noted in HPI.        Objective:     /74 (Patient Site: Left Arm, Patient Position: Sitting, Cuff Size: Adult Regular)   Wt 160 lb (72.6 kg)   LMP  (LMP Unknown)   BMI 25.82 kg/m    General appearance: alert, appears stated age and cooperative  Extremities: tenderness along lateral joint line; no effusion present. No tenderness along lateral thigh, trochanteric bursa or gluteal muscles on left. Normal ROM left hip.       This note has been dictated using voice recognition software. Any grammatical or context distortions are unintentional and inherent to the  software

## 2021-05-31 NOTE — TELEPHONE ENCOUNTER
Who is calling:  patient  Reason for Call:  Patient is asking for the Shingles Vaccine-patient was advised to contact insurance company for coverage info  Date of last appointment with primary care: n/a  Okay to leave a detailed message: Yes

## 2021-05-31 NOTE — PROGRESS NOTES
Assessment and Plan:       1. Routine general medical examination at a health care facility  The patient is up-to-date on her colonoscopy as well as mammogram.  She is due for updated fasting lab work and that was performed today.  Her immunizations are up-to-date and she just recently started the new shingles vaccine series.  The patient exercises 3-5 times per week although was not getting as many fruits and vegetables into her diet as she should.    2. Hyperlipidemia, unspecified hyperlipidemia type  - simvastatin (ZOCOR) 10 MG tablet; Take 1 tablet (10 mg total) by mouth daily.  Dispense: 90 tablet; Refill: 3  - Lipid Profile    3. Urgency incontinence  The patient continues to have issues with frequent urination which is quite bothersome at night.  We discussed options and a prescription for Detrol was provided today to take in the evening we did talk about potential side effects and I asked her to let me know in about a month if she would like a 90-day supply if this medication is effective at reducing symptoms and well-tolerated.  - tolterodine (DETROL LA) 4 MG ER capsule; Take 1 capsule (4 mg total) by mouth daily.  Dispense: 30 capsule; Refill: 0    4. Chronic right shoulder pain  This may be related to more of a proximal biceps tendinitis versus a low-grade rotator cuff tendinitis.  X-ray was performed in clinic today and I read and interpreted this is negative for any arthritis, fracture or other abnormality.  I recommended that she follow-up with her physical therapist one more time to discuss exercises specific for a proximal biceps tendinitis but that I would not do any further work-up unless the symptoms are becoming more persistent or bothersome.  - XR Shoulder Right 2 or More VWS; Future    5. Osteopenia  Due for follow-up bone density test to assess response to the alendronate.  - alendronate (FOSAMAX) 70 MG tablet; Take 1 tablet (70 mg total) by mouth every 7 days.  Dispense: 12 tablet; Refill:  3  - Renal Function Profile  - DXA Bone Density Scan; Future    6. Prediabetes  - Glycosylated Hemoglobin A1c        The patient's current medical problems were reviewed.    I have had an Advance Directives discussion with the patient.  The following health maintenance schedule was reviewed with the patient and provided in printed form in the after visit summary:   Health Maintenance   Topic Date Due     ZOSTER VACCINES (2 of 3) 12/17/2009     INFLUENZA VACCINE RULE BASED (1) 08/01/2019     MEDICARE ANNUAL WELLNESS VISIT  08/06/2019     FALL RISK ASSESSMENT  06/24/2020     DXA SCAN  08/24/2020     MAMMOGRAM  07/15/2021     ADVANCE CARE PLANNING  05/13/2024     COLONOSCOPY  08/12/2024     TD 18+ HE  07/24/2027     PNEUMOCOCCAL POLYSACCHARIDE VACCINE AGE 65 AND OVER  Completed     PNEUMOCOCCAL CONJUGATE VACCINE FOR ADULTS (PCV13 OR PREVNAR)  Completed        Subjective:     Chief Complaint: Kalpana Manzo is an 79 y.o. female here for an Annual Wellness visit.     HPI:  The patient has a few questions today including a follow up on her right shoulder. She is attending physical therapy but is not sure it has been helping. She does not find a pattern as to when it is most bothersome, except perhaps first thing in the morning. She does not feel it causes a lot of pain. She has a new concern regarding achiness of her legs which started last Friday and continued for a couple more days. She just had her shingles shot on Thursday of last week. She continues to have issues with frequent urination throughout the night with occasional incontinence.     Review of Systems: Please see above.  The rest of the review of systems are negative for all systems.    Patient Care Team:  Mela López MD as PCP - General     Patient Active Problem List   Diagnosis     Hepatitis     Basal Cell Carcinoma Of The Skin     Urinary Frequency More Than Twice At Night (Nocturia)     Hyperlipidemia     Onychomycosis     Osteopenia      Osteoarthritis Of The Knee     Eczema     Limb Swelling     Prediabetes     Varicose vein of leg     Past Medical History:   Diagnosis Date     Basal Cell Carcinoma Of The Skin     Created by Conversion  Replacement Utility updated for latest IMO load     Eczema     Created by Conversion      Hepatitis     Created by Conversion [facility] Annotation: Aug 22 2011  9:09AM - Mela López: in the 1980's  unspecified  Replacement Utility updated for latest IMO load     Hyperlipidemia     Created by Conversion      Limb Swelling     Created by Conversion      Onychomycosis     Created by Conversion      Osteoarthritis Of The Knee     Created by Conversion  Replacement Utility updated for latest IMO load     Osteopenia     Created by Conversion  Replacement Utility updated for latest IMO load     Prediabetes 7/18/2016     Urinary Frequency More Than Twice At Night (Nocturia)     Created by Conversion       Past Surgical History:   Procedure Laterality Date     BREAST BIOPSY Left 1987     BREAST BIOPSY Right 1992     PA APPENDECTOMY      Description: Appendectomy;  Recorded: 05/19/2008;     PA BIOPSY OF BREAST, INCISIONAL      Description: Biopsy Breast Open;  Recorded: 05/19/2008;     PA REMOVE TONSILS/ADENOIDS,<11 Y/O      Description: Tonsillectomy With Adenoidectomy;  Recorded: 05/19/2008;     PA REVISE SECONDARY VARICOSITY      Description: Varicose Vein Ligation;  Recorded: 05/19/2008;     TOTAL KNEE ARTHROPLASTY Right 08/2008      Family History   Problem Relation Age of Onset     Varicose Veins Mother       Social History     Socioeconomic History     Marital status:      Spouse name: Not on file     Number of children: Not on file     Years of education: Not on file     Highest education level: Not on file   Occupational History     Not on file   Social Needs     Financial resource strain: Not on file     Food insecurity:     Worry: Not on file     Inability: Not on file     Transportation needs:      "Medical: Not on file     Non-medical: Not on file   Tobacco Use     Smoking status: Never Smoker     Smokeless tobacco: Never Used   Substance and Sexual Activity     Alcohol use: Yes     Comment: rare     Drug use: No     Sexual activity: Not on file   Lifestyle     Physical activity:     Days per week: Not on file     Minutes per session: Not on file     Stress: Not on file   Relationships     Social connections:     Talks on phone: Not on file     Gets together: Not on file     Attends Scientologist service: Not on file     Active member of club or organization: Not on file     Attends meetings of clubs or organizations: Not on file     Relationship status: Not on file     Intimate partner violence:     Fear of current or ex partner: Not on file     Emotionally abused: Not on file     Physically abused: Not on file     Forced sexual activity: Not on file   Other Topics Concern     Not on file   Social History Narrative    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 12 tablet 3     cholecalciferol, vitamin D3, (VITAMIN D3) 2,000 unit Tab 2,000 Units daily.              GLUCOSAM HCL/CHONDRO MONTES A/C/MN (GLUCOSAMINE-CHONDROITIN COMPLX ORAL) Take 1 tablet by mouth daily. Tablet       multivitamin capsule Take 1 capsule by mouth daily.       omega-3 fatty acids (FISH OIL CONCENTRATE) 1,000 mg cap        simvastatin (ZOCOR) 10 MG tablet Take 1 tablet (10 mg total) by mouth daily. 90 tablet 3     tolterodine (DETROL LA) 4 MG ER capsule Take 1 capsule (4 mg total) by mouth daily. 30 capsule 0     No current facility-administered medications for this visit.       Objective:   Vital Signs:   Visit Vitals  /76 (Patient Site: Left Arm, Patient Position: Sitting, Cuff Size: Adult Regular)   Pulse 67   Ht 5' 6\" (1.676 m)   Wt 159 lb (72.1 kg)   LMP  (LMP Unknown)   SpO2 97%   BMI 25.66 " kg/m           PHYSICAL EXAM  Physical Exam   Constitutional: She is oriented to person, place, and time.   HENT:   Head: Normocephalic and atraumatic.   Mouth/Throat: Oropharynx is clear and moist.   Eyes: Pupils are equal, round, and reactive to light.   Cardiovascular: Normal rate, regular rhythm and normal heart sounds.   Pulmonary/Chest: Effort normal and breath sounds normal. Right breast exhibits no mass. Left breast exhibits no mass.   Abdominal: Soft. She exhibits no mass.   Lymphadenopathy:     She has no cervical adenopathy.     She has no axillary adenopathy.   Neurological: She is alert and oriented to person, place, and time.   Psychiatric: She has a normal mood and affect.   Nursing note and vitals reviewed.      Assessment Results 8/12/2019   Activities of Daily Living No help needed   Instrumental Activities of Daily Living No help needed   Mini Cog Total Score 5   Some recent data might be hidden     A Mini-Cog score of 0-2 suggests the possibility of dementia, score of 3-5 suggests no dementia    Identified Health Risks:     The patient was counseled and encouraged to consider modifying their diet and eating habits. She was provided with information on recommended healthy diet options.  The patient was provided with written information regarding signs of hearing loss.  Patient's advanced directive was discussed and I am comfortable with the patient's wishes.

## 2021-05-31 NOTE — TELEPHONE ENCOUNTER
Reason contacted:  Phone call  Information relayed:  Talked with Dr. López and she is going to pace order for MRI of left lower leg. Gave patient the number for MRI to get an appt.  Additional questions:  No  Further follow-up needed:  No  Okay to leave a detailed message:  No     Vane Mcconnell, Clinic Assistant

## 2021-05-31 NOTE — PROGRESS NOTES
Joint Aspiration/Injection  Date/Time: 8/22/2019 11:29 AM  Performed by: Mela López MD  Authorized by: Mela López MD       Universal Protocol    Site marked: No    Prior images obtained and reviewed: Yes    Required items: required blood products, implants, devices, and special equipment available    Patient identity confirmed: verbally with patient    Reevaluation: NA - No sedation, light sedation, or local anesthesia    Confirmation checklist: procedure was appropriate and matched the consent or emergent situation    Time out: Immediately prior to procedure a time out was called to verify the correct patient, procedure, equipment, support staff and site/side marked as required    Universal Protocol: Joint Commission Universal Protocol was followed    Preparation: Patient was prepped and draped in the usual sterile fashion    Indications  Indications: pain     Location  Body area: knee  Joint: left knee      Anesthesia    Local anesthesia used?: Yes    Anesthesia: local infiltration    Local anesthetic: lidocaine 1% without epinephrine    Anesthetic total (mL): 1    Sedation  Patient sedation: No    Procedure Details  Needle size: 22 G  Ultrasound guidance: no  Approach: lateral  Methylprednisolone amount: 80 mg      Post-procedure    Patient tolerance: Patient tolerated the procedure well with no immediate complications   Length of time physician present for 1:1 monitoring during sedation: 0

## 2021-05-31 NOTE — TELEPHONE ENCOUNTER
Patient having pain in Lower Left Leg for the last couple months. She has been trying advil, biofreeze. She has no history of DVTs. Please advise. Patient would like xray.

## 2021-05-31 NOTE — PROGRESS NOTES
[facility] Daily Progress     Patient Name: Kalpana Manzo  Date: 8/5/2019  Visit #: 3/10  Authorization dates:  7/1/19-9/29/19  Referral Diagnosis: Chronic right shoulder pain  Referring provider: Mela López MD  Visit Diagnosis:     ICD-10-CM    1. Chronic right shoulder pain M25.511     G89.29        Precautions / Restrictions : skin cancer, hepatitis, hyperlipidemia, knee OA, osteopenia, prediabetes       Assessment:     Response to Intervention:  Tolerated manual therapy well without soreness.  Scapular mechanics improving since last visit.  Patient has good compliance and     Symptoms are consistent with myofascial pain and possible peripheral nerve impingement.  Patient is appropriate to continue with skilled physical therapy intervention, as indicated by initial plan of care.    Goal Status:  Pt. will demonstrate/verbalize independence in self-management of condition in : 6 weeks;Progressing toward  Pt. will be independent with home exercise program in : 6 weeks;Progressing toward  Pt. will improve posture : and demonstrate posture with minimal to no cuing;in 6 weeks;Progressing toward  Patient will reach / maintain arm movement: forward;overhead;behind;for home chores;for dressing;with no pain;with less difficulty;in 6 weeks;Progressing toward    No data recorded  Other functional progress:           Plan / Patient Education:     Continue with initial plan of care.  Progress with home program as tolerated.  Band exercises, arm bike.    Subjective:     Pain Rating:  Resting 0  Activity:  0    Response to last treatment: felt Ok.  No pain.  HEP- Frequency: 1-2x/day, Questions or difficulties:  none.    Patient reports:      Most of the time it doesn't hurt.  It is painful sometimes at night when rolling over in bed.    75% overall improvement since starting physical therapy.      Objective:       Less compensation with AROM today than at evaluation.      Treatment Today   Manual  "Therapy  Manual therapy:  right shoulder inferior mobilization    Rate/grade Target  Direction  Relative movement Location in range Patient position   2 Humeral head Inferior  Inferior glide of humeral head on glenoid. Varying degrees of shoulder abduction from neutral to end-range. Supine       Manual therapy:  right shoulder posterior mobilization    Rate/grade Target  Direction  Relative movement Location in range Patient position   2 Humeral head Posterior  Posterior glide of humeral head on glenoid. 90 degrees of shoulder abduction and in varying degrees of IR from neutral to end-range. Supine       MFR layers 1-3 right: upper tap, deltoid, pec major/minor, lat dorsi, teres major    Exercises:  Exercise #1: posterior shoulder stretch  Comment #1: 30\" x 2 bilateral  Exercise #2: inferior shoulder stretch  Comment #2: 30\" x 2   Exercise #3: pec stretch  Comment #3:  30\" x 2             TREATMENT MINUTES COMMENTS   Evaluation     Self-care/ Home management     Manual therapy 25 See above.   Neuromuscular Re-education     Therapeutic Activity     Therapeutic Exercises  See exercise flow-sheet for details.    Gait training     Modality__________________                Total 25    Blank areas are intentional and mean the treatment did not include these items.       Andrés Caba, PT  8/5/2019     "

## 2021-05-31 NOTE — TELEPHONE ENCOUNTER
Reason contacted:  Appt  Information relayed:  LVM that we had the Shingrix vaccine in stock and that pt could make a CSS appt at their earliest convenience to begin the series.  Pt also reminded to check insurance for coverage and that it may be cheaper for them to receive the immunization through a pharmacy.  Additional questions:  No  Further follow-up needed:  No  Okay to leave a detailed message:  Yes

## 2021-05-31 NOTE — PROGRESS NOTES
[facility] Discharge Summary  Patient Name: Kalpana Manzo  Date: 2/4/2020  Referral Diagnosis: Chronic right shoulder pain  Referring provider: Mela López MD  Visit Diagnosis:   1. Chronic right shoulder pain         Goals:  No data recorded  No data recorded    Patient was seen for 4 visits ending on 8/14/19.  A trial of independent self management was initiated.  The patient did not return to continue any physical therapy.  Please see attached note for patient status.    Therapy will be discontinued at this time.     Thank you for your referral.  Andrés Caba  2/4/2020  11:29 PM     [facility] Daily Progress     Patient Name: Kalpana Manzo  Date: 8/14/2019  Visit #: 4/10  Authorization dates:  7/1/19-9/29/19  Referral Diagnosis: Chronic right shoulder pain  Referring provider: Mela López MD  Visit Diagnosis:     ICD-10-CM    1. Chronic right shoulder pain M25.511     G89.29        Precautions / Restrictions : skin cancer, hepatitis, hyperlipidemia, knee OA, osteopenia, prediabetes       Assessment:     Response to Intervention:  Tolerated manual therapy well without soreness.  Scapular mechanics improving since last visit.  Patient has good compliance and independent with HEP.    Symptoms are consistent with myofascial pain and possible peripheral nerve impingement.  Patient is appropriate to continue with skilled physical therapy intervention, as indicated by initial plan of care.    Goal Status:  Pt. will demonstrate/verbalize independence in self-management of condition in : 6 weeks;Progressing toward  Pt. will be independent with home exercise program in : 6 weeks;Progressing toward  Pt. will improve posture : and demonstrate posture with minimal to no cuing;in 6 weeks;Progressing toward  Patient will reach / maintain arm movement: forward;overhead;behind;for home chores;for dressing;with no pain;with less difficulty;in 6 weeks;Progressing toward    No data  "recorded  Other functional progress:     75% overall improvement since starting physical therapy-8/5/19      Plan / Patient Education:     Trial of independent self-management of condition initiated.  Patient to contact PT by phone or schedule an appointment as needed if symptoms increase or progress stops.  If patient has not returned to continue therapy in 30 days then physical therapy will be discharged.     Subjective:     Pain Rating:  Resting 0  Activity:  0    Response to last treatment: felt Ok.  No pain.  HEP- Frequency: 1-2x/day, Questions or difficulties:  none.    Patient reports:      Shoulder only hurts when reaching behind her.    .      Objective:       AROM WFL except pain with right extension/IR reach      Treatment Today   Manual Therapy  Manual therapy:  right shoulder inferior mobilization    Rate/grade Target  Direction  Relative movement Location in range Patient position   2 Humeral head Inferior  Inferior glide of humeral head on glenoid. Varying degrees of shoulder abduction from neutral to end-range. Supine       Manual therapy:  right shoulder posterior mobilization    Rate/grade Target  Direction  Relative movement Location in range Patient position   2 Humeral head Posterior  Posterior glide of humeral head on glenoid. 90 degrees of shoulder abduction and in varying degrees of IR from neutral to end-range. Supine       MFR layers 1-3 right: upper tap, deltoid, pec major/minor, lat dorsi, teres major    Exercises:  Exercise #1: posterior shoulder stretch  Comment #1: 30\" x 2 bilateral  Exercise #2: inferior shoulder stretch  Comment #2: 30\" x 2   Exercise #3: pec stretch  Comment #3:  30\" x 2             TREATMENT MINUTES COMMENTS   Evaluation     Self-care/ Home management     Manual therapy 25 See above.   Neuromuscular Re-education     Therapeutic Activity     Therapeutic Exercises  See exercise flow-sheet for details.    Gait training     Modality__________________              "   Total 25    Blank areas are intentional and mean the treatment did not include these items.       Andrés Caba, PT  8/14/2019

## 2021-06-01 VITALS — WEIGHT: 153.5 LBS | BODY MASS INDEX: 24.67 KG/M2 | HEIGHT: 66 IN

## 2021-06-01 NOTE — TELEPHONE ENCOUNTER
Pt has appt for MRI today, ordered for Knee, pt thought it would be for left lower leg.   appt canceled for today   Please call pt to clarify location of MRI

## 2021-06-01 NOTE — TELEPHONE ENCOUNTER
Refill Approved    Rx renewed per Medication Renewal Policy. Medication was last renewed on 8/12/19.    Joana Fuentes, [facility] Triage/Med Refill 9/11/2019     Requested Prescriptions   Pending Prescriptions Disp Refills     tolterodine (DETROL LA) 4 MG ER capsule [Pharmacy Med Name: TOLTERODINE TART ER 4 MG CAP] 30 capsule 0     Sig: TAKE 1 CAPSULE BY MOUTH EVERY DAY       Urinary Incontinence Medications Refill Protocol Passed - 9/10/2019  9:34 AM        Passed - PCP or prescribing provider visit in past 12 months       Last office visit with prescriber/PCP: 8/22/2019 Mela López MD OR same dept: 8/22/2019 Mela López MD OR same specialty: 8/22/2019 Mela López MD  Last physical: 8/12/2019 Last MTM visit: Visit date not found   Next visit within 3 mo: Visit date not found  Next physical within 3 mo: Visit date not found  Prescriber OR PCP: Mela López MD  Last diagnosis associated with med order: 1. Urgency incontinence  - tolterodine (DETROL LA) 4 MG ER capsule [Pharmacy Med Name: TOLTERODINE TART ER 4 MG CAP]; TAKE 1 CAPSULE BY MOUTH EVERY DAY  Dispense: 30 capsule; Refill: 0    If protocol passes may refill for 12 months if within 3 months of last provider visit (or a total of 15 months).

## 2021-06-01 NOTE — TELEPHONE ENCOUNTER
Yes, if an orthopedic specialist is recommending it, I would definitely follow through. The likelihood is low, but this is not something you want to miss.

## 2021-06-02 VITALS — BODY MASS INDEX: 24.69 KG/M2 | WEIGHT: 153 LBS

## 2021-06-02 VITALS — BODY MASS INDEX: 24.68 KG/M2 | HEIGHT: 66 IN | BODY MASS INDEX: 24.68 KG/M2 | WEIGHT: 152.9 LBS

## 2021-06-03 VITALS — WEIGHT: 158 LBS | BODY MASS INDEX: 25.5 KG/M2

## 2021-06-03 VITALS — HEIGHT: 66 IN | BODY MASS INDEX: 25.55 KG/M2 | WEIGHT: 159 LBS

## 2021-06-03 VITALS — BODY MASS INDEX: 25.82 KG/M2 | WEIGHT: 160 LBS

## 2021-06-03 VITALS — WEIGHT: 160 LBS | BODY MASS INDEX: 25.71 KG/M2 | HEIGHT: 66 IN

## 2021-06-03 VITALS — WEIGHT: 160 LBS | BODY MASS INDEX: 25.82 KG/M2

## 2021-06-04 VITALS
BODY MASS INDEX: 25.23 KG/M2 | OXYGEN SATURATION: 98 % | SYSTOLIC BLOOD PRESSURE: 118 MMHG | HEART RATE: 83 BPM | HEIGHT: 66 IN | DIASTOLIC BLOOD PRESSURE: 72 MMHG | WEIGHT: 157 LBS

## 2021-06-04 VITALS
OXYGEN SATURATION: 97 % | BODY MASS INDEX: 25.34 KG/M2 | HEART RATE: 76 BPM | SYSTOLIC BLOOD PRESSURE: 128 MMHG | WEIGHT: 157 LBS | DIASTOLIC BLOOD PRESSURE: 74 MMHG

## 2021-06-05 VITALS
BODY MASS INDEX: 25.37 KG/M2 | HEART RATE: 79 BPM | SYSTOLIC BLOOD PRESSURE: 112 MMHG | OXYGEN SATURATION: 99 % | WEIGHT: 157.2 LBS | DIASTOLIC BLOOD PRESSURE: 68 MMHG

## 2021-06-09 NOTE — PROGRESS NOTES
Assessment/Plan:     Problem List Items Addressed This Visit        [facility] Cardiac Problems    Hyperlipidemia    Relevant Medications    simvastatin (ZOCOR) 10 MG tablet    Other Relevant Orders    Lipid Profile       Other    Osteopenia    Relevant Medications    alendronate (FOSAMAX) 70 MG tablet    Other Relevant Orders    Renal Function Profile    Prediabetes    Relevant Orders    Glycosylated Hemoglobin A1c    Midline cystocele      Other Visit Diagnoses     Bilateral sensorineural hearing loss    -  Primary    Relevant Orders    Ambulatory referral to Audiology        The patient's swelling relates to a cystocele.  I counseled the patient regarding the etiology of a cystocele and the potential symptoms that can arise from that.  We talked about her tolterodine and the potential side effects and I advised her to consider starting MiraLAX daily when she starts the medication in order to proactively get on top of any additional constipation.  I provided a referral for audiology to assess her hearing and provide her with information regarding hearing aids.  I refilled her medications and the patient received updated lab work today.    Subjective:       80 y.o. female presents for a routine medication check visit. The patient did not start the tolterodine as prescribed as she was concerned about the constipating effects.  The patient would like my advice on taking the medication and how to prevent that.  She currently does have issues with constipation and takes either prunes or prune juice daily.  The patient also reports a swelling in her vaginal area.  It is not painful or bothersome per se but it does feel unusual that she would like to have that checked out today.  The patient needs some refills on her medication today.  She also would like my opinion regarding where to start the process of looking for hearing aids that she does struggle with her hearing.    Reviewed: The following portions of the  patient's history were reviewed and updated as appropriate: allergies, current medications, past family history, past medical history, past social history, past surgical history and problem list.    Review of Systems  Pertinent items are noted in HPI.        Objective:     /74 (Patient Site: Left Arm, Patient Position: Sitting, Cuff Size: Adult Regular)   Pulse 76   Wt 157 lb (71.2 kg)   LMP  (LMP Unknown)   SpO2 97%   BMI 25.34 kg/m    General appearance: alert, appears stated age and cooperative  Lungs: clear to auscultation bilaterally  Heart: regular rate and rhythm, S1, S2 normal, no murmur, click, rub or gallop  Pelvic: cystocele present; otherwise normal external and internal vaginal exam. Cervix visualized and appeared normal.     This note has been dictated using voice recognition software. Any grammatical or context distortions are unintentional and inherent to the software

## 2021-06-10 NOTE — PROGRESS NOTES
Assessment and Plan:       Problem List Items Addressed This Visit        [facility] Cardiac Problems    Hyperlipidemia     Takes Simvastatin 10mg daily            Other    Osteopenia     Continues Fosamax 70mg weekly; taking since 2018. Last Dexa 2019 showing a good response. Continue medication.          Prediabetes     Recent A1C in the normal range           Other Visit Diagnoses     Routine general medical examination at a health care facility    -  Primary            The patient's current medical problems were reviewed.    I have had an Advance Directives discussion with the patient.  The following health maintenance schedule was reviewed with the patient and provided in printed form in the after visit summary:   Health Maintenance   Topic Date Due     HEPATITIS B VACCINES (1 of 3 - Risk 3-dose series) [birth year]07/1959     INFLUENZA VACCINE RULE BASED (1) 08/01/2020     MEDICARE ANNUAL WELLNESS VISIT  08/27/2021     FALL RISK ASSESSMENT  08/27/2021     DXA SCAN  09/10/2021     MAMMOGRAM  07/17/2022     COLORECTAL CANCER SCREENING  08/12/2024     LIPID  07/06/2025     ADVANCE CARE PLANNING  08/27/2025     TD 18+ HE  07/24/2027     PNEUMOCOCCAL IMMUNIZATION 65+ LOW/MEDIUM RISK  Completed     ZOSTER VACCINES  Completed        Subjective:   Chief Complaint: Kalpana Manzo is an 80 y.o. female here for an Annual Wellness visit.     HPI:  The patient is doing well overall without any specific complaints or concerns. She recently got hearing aids and is quite happy with those so far.     Review of Systems:  Please see above.  The rest of the review of systems are negative for all systems.    Patient Care Team:  Mela López MD as PCP - General  Mela López MD as Assigned PCP     Patient Active Problem List   Diagnosis     Hepatitis     Basal Cell Carcinoma Of The Skin     Urinary Frequency More Than Twice At Night (Nocturia)     Hyperlipidemia     Onychomycosis     Osteopenia     Osteoarthritis Of The  Knee     Eczema     Limb Swelling     Prediabetes     Varicose vein of leg     Midline cystocele     Past Medical History:   Diagnosis Date     Basal Cell Carcinoma Of The Skin     Created by Conversion  Replacement Utility updated for latest IMO load     Eczema     Created by Conversion      Hepatitis     Created by Conversion [facility] Annotation: Aug 22 2011  9:09AM - Mela López: in the 1980's  unspecified  Replacement Utility updated for latest IMO load     Hyperlipidemia     Created by Conversion      Limb Swelling     Created by Conversion      Onychomycosis     Created by Conversion      Osteoarthritis Of The Knee     Created by Conversion  Replacement Utility updated for latest IMO load     Osteopenia     Created by Conversion  Replacement Utility updated for latest IMO load     Prediabetes 7/18/2016     Urinary Frequency More Than Twice At Night (Nocturia)     Created by Conversion       Past Surgical History:   Procedure Laterality Date     BREAST BIOPSY Left 1987     BREAST BIOPSY Right 1992     IL APPENDECTOMY      Description: Appendectomy;  Recorded: 05/19/2008;     IL BIOPSY OF BREAST, INCISIONAL      Description: Biopsy Breast Open;  Recorded: 05/19/2008;     IL REMOVE TONSILS/ADENOIDS,<13 Y/O      Description: Tonsillectomy With Adenoidectomy;  Recorded: 05/19/2008;     IL REVISE SECONDARY VARICOSITY      Description: Varicose Vein Ligation;  Recorded: 05/19/2008;     TOTAL KNEE ARTHROPLASTY Right 08/2008      Family History   Problem Relation Age of Onset     Varicose Veins Mother       Social History     Socioeconomic History     Marital status:      Spouse name: Not on file     Number of children: Not on file     Years of education: Not on file     Highest education level: Not on file   Occupational History     Not on file   Social Needs     Financial resource strain: Not on file     Food insecurity     Worry: Not on file     Inability: Not on file     Transportation needs      "Medical: Not on file     Non-medical: Not on file   Tobacco Use     Smoking status: Never Smoker     Smokeless tobacco: Never Used   Substance and Sexual Activity     Alcohol use: Yes     Comment: rare     Drug use: No     Sexual activity: Not on file   Lifestyle     Physical activity     Days per week: Not on file     Minutes per session: Not on file     Stress: Not on file   Relationships     Social connections     Talks on phone: Not on file     Gets together: Not on file     Attends Rastafarian service: Not on file     Active member of club or organization: Not on file     Attends meetings of clubs or organizations: Not on file     Relationship status: Not on file     Intimate partner violence     Fear of current or ex partner: Not on file     Emotionally abused: Not on file     Physically abused: Not on file     Forced sexual activity: Not on file   Other Topics Concern     Not on file   Social History Narrative    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 12 tablet 3     cholecalciferol, vitamin D3, (VITAMIN D3) 2,000 unit Tab 2,000 Units daily.              GLUCOSAM HCL/CHONDRO MONTES A/C/MN (GLUCOSAMINE-CHONDROITIN COMPLX ORAL) Take 1 tablet by mouth daily. Tablet       multivitamin capsule Take 1 capsule by mouth daily.       omega-3 fatty acids (FISH OIL CONCENTRATE) 1,000 mg cap daily.              simvastatin (ZOCOR) 10 MG tablet Take 1 tablet (10 mg total) by mouth daily. 90 tablet 3     triamcinolone (KENALOG) 0.1 % cream        No current facility-administered medications for this visit.       Objective:   Vital Signs:   Visit Vitals  /72 (Patient Site: Left Arm, Patient Position: Sitting, Cuff Size: Adult Regular)   Pulse 83   Ht 5' 6\" (1.676 m)   Wt 157 lb (71.2 kg)   LMP  (LMP Unknown)   SpO2 98%   BMI 25.34 kg/m           PHYSICAL EXAM  Physical " Exam  Vitals signs and nursing note reviewed.   HENT:      Head: Normocephalic and atraumatic.   Eyes:      Pupils: Pupils are equal, round, and reactive to light.   Cardiovascular:      Rate and Rhythm: Normal rate and regular rhythm.      Heart sounds: Normal heart sounds.   Pulmonary:      Effort: Pulmonary effort is normal.      Breath sounds: Normal breath sounds.   Chest:      Breasts:         Right: No mass.         Left: No mass.   Abdominal:      Palpations: Abdomen is soft. There is no mass.   Lymphadenopathy:      Cervical: No cervical adenopathy.   Neurological:      Mental Status: She is alert and oriented to person, place, and time.           Assessment Results 8/27/2020   Activities of Daily Living No help needed   Instrumental Activities of Daily Living No help needed   Mini Cog Total Score 5   Some recent data might be hidden     A Mini-Cog score of 0-2 suggests the possibility of dementia, score of 3-5 suggests no dementia      Identified Health Risks:     Information on urinary incontinence and treatment options given to patient.  Patient's advanced directive was discussed and I am comfortable with the patient's wishes.
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
## 2021-06-10 NOTE — PATIENT INSTRUCTIONS - HE
Patient Education   Urinary Incontinence, Female (Adult)  Urinary incontinence means loss of control of the bladder. This problem affects many women, especially as they get older. If you have incontinence, you may be embarrassed to ask for help. But know that this problem can be treated.  Types of Incontinence  There are different types of incontinence. Two of the main types are described here. You can have more than one type.    Stress incontinence. With this type, urine leaks when pressure (stress) is put on the bladder. This may happen when you cough, sneeze, or laugh. Stress incontinence most often occurs because the pelvic floor muscles that support the bladder and urethra are weak. This can happen after pregnancy and vaginal childbirth or a hysterectomy. It can also be due to excess body weight or hormone changes.    Urge incontinence (also called overactive bladder). With this type, a sudden urge to urinate is felt often. This may happen even though there may not be much urine in the bladder. The need to urinate often during the night is common. Urge incontinence most often occurs because of bladder spasms. This may be due to bladder irritation or infection. Damage to bladder nerves or pelvic muscles, constipation, and certain medicines can also lead to urge incontinence.  Treatment of urinary incontinence depends on the cause. Further evaluation is needed to find the type you have. This will likely include an exam and certain tests. Based on the results, you and your healthcare provider can then plan treatment. Until a diagnosis is made, the home care tips below can help relieve symptoms.  Home care    Do pelvic floor muscle exercises, if they are prescribed. The pelvic floor muscles help support the bladder and urethra. Many women find that their symptoms improve when doing special exercises that strengthen these muscles. To do the exercises contract the muscles you would use to stop your stream of urine,  but do this when you re not urinating. Hold for 10 seconds, then relax. Repeat 10 to 20 times in a row, at least 3 times a day. Your provider may give you other instructions for how to do the exercises and how often.    Keep a bladder diary. This helps track how often and how much you urinate over a set period of time. Bring this diary with you to your next visit with the provider. The information can help your provider learn more about your bladder problem.    Lose weight, if advised to by your provider. Excess weight puts pressure on the bladder. Your provider can help you create a weight-loss plan that s right for you. This may include exercising more and making certain diet changes.    Don't consume foods and drinks that may irritate the bladder. These can include alcohol and caffeinated drinks.    Quit smoking. Smoking and other tobacco use can lead to chronic cough that strains the pelvic floor muscles. Smoking may also damage the bladder and urethra. Talk with your provider about treatments or methods you can use to quit smoking.    If drinking large amounts of fluid causes you to have symptoms, you may be advised to limit your fluid intake. You may also be advised to drink most of your fluids during the day and to limit fluids at night.    If you re worried about urine leakage or accidents, you may wear absorbent pads to catch urine. Change the pads often. This helps reduce discomfort. It may also reduce the risk of skin or bladder infections.  Follow-up care  Follow up with your healthcare provider, or as directed. It may take some to find the right treatment for your problem. Your treatment plan may include special therapies or medicines. Certain procedures or surgery may also be options. Be sure to discuss any questions you have with your provider.  When to seek medical advice  Call the healthcare provider right away if any of these occur:    Fever of 100.4 F (38 C) or higher, or as directed by your  provider    Bladder pain or fullness    Abdominal swelling    Nausea or vomiting    Back pain    Weakness, dizziness or fainting  Date Last Reviewed: 10/1/2017    2812-0255 [employer]. [address] [place] [address], [place] [address]. All rights reserved. This information is not intended as a substitute for professional medical care. Always follow your healthcare professional's instructions.       Advance Directive  Patient s advance directive was discussed and I am comfortable with the patient s wishes.  Patient Education   Personalized Prevention Plan  You are due for the preventive services outlined below.  Your care team is available to assist you in scheduling these services.  If you have already completed any of these items, please share that information with your care team to update in your medical record.  Health Maintenance   Topic Date Due     HEPATITIS B VACCINES (1 of 3 - Risk 3-dose series) [birth year]/1959     INFLUENZA VACCINE RULE BASED (1) 08/01/2020     MEDICARE ANNUAL WELLNESS VISIT  08/12/2020     FALL RISK ASSESSMENT  07/06/2021     DXA SCAN  09/10/2021     MAMMOGRAM  07/17/2022     COLORECTAL CANCER SCREENING  08/12/2024     ADVANCE CARE PLANNING  09/30/2024     LIPID  07/06/2025     TD 18+ HE  07/24/2027     PNEUMOCOCCAL IMMUNIZATION 65+ LOW/MEDIUM RISK  Completed     ZOSTER VACCINES  Completed

## 2021-06-10 NOTE — PATIENT INSTRUCTIONS - HE
Consider a trial of hearing amplification  Return visit for repeat hearing test in 6 months  You have a downsloping inner ear hearing loss  The word accuracy on the LEFT is 100%  The word accuracy on the RIGHT is 76%  Ears look health on exam  You have some additional (mechanical) loss on the right ear (Mixed hearing loss)

## 2021-06-12 NOTE — PROGRESS NOTES
Assessment/Plan:     Problem List Items Addressed This Visit     None      Visit Diagnoses     Cystocele, midline    -  Primary    Adnexal fullness        Relevant Orders    US Pelvis With Transvaginal Non OB        We discussed the finding of the cystocele and indications for intervention.  The patient does not have any indication at this time and advised her to simply monitor which she is comfortable doing.  However, on physical examination and bimanual pelvic exam today, I did feel that there was an asymmetric fullness in the left adnexa and recommended a pelvic ultrasound to evaluate further.  I will get back to the patient following those results.    Subjective:       80 y.o. female presents for reevaluation of a cystocele.  The patient was found to have a cystocele but states she has been noticing a little more often feeling of a bulge or something coming out of the vagina.  This does not cause any pain or discomfort.  The patient denies any associated pelvic pain.  She is also not having any significant issues or change in urinary function.  She just wanted to make sure before leaving for [place] for the winter that nothing else was wrong.      Reviewed: The following portions of the patient's history were reviewed and updated as appropriate: allergies, current medications, past family history, past medical history, past social history, past surgical history and problem list.    Review of Systems  Pertinent items are noted in HPI.        Objective:     /68 (Patient Site: Left Arm, Patient Position: Sitting, Cuff Size: Adult Regular)   Pulse 79   Wt 157 lb 3.2 oz (71.3 kg)   LMP  (LMP Unknown)   SpO2 99%   BMI 25.37 kg/m    General appearance: alert, appears stated age and cooperative  Pelvic: speculum placed and there is a soft bulging of the anterior vagina visible. Normal speculum exam other than this. Bimanual exam performed and there was a suggestion of fullness in the left adnexa. Normal right  adnexa. No pain to palpation.       This note has been dictated using voice recognition software. Any grammatical or context distortions are unintentional and inherent to the software

## 2021-06-12 NOTE — PROGRESS NOTES
Assessment/Plan:      Healthy female exam.   1. Cystitis  The patient does appear to have an acute cystitis.  A prescription for Bactrim DS twice daily for 5 days was provided.  Await culture results.  - Urinalysis-UC if Indicated  - Culture, Urine    2. Hyperlipidemia  - simvastatin (ZOCOR) 10 MG tablet; TAKE ONE TABLET BY MOUTH ONE TIME DAILY  Dispense: 90 tablet; Refill: 3  - Lipid Profile  - Comprehensive Metabolic Panel    3. Prediabetes  The patient recently lost about 8 pounds through a reduction in her intake of baked goods and sugary foods.  She exercises regularly.  - Glycosylated Hemoglobin A1c    4. Osteopenia  The patient does supplement vitamin D and is up-to-date on her bone density test.    5. Routine general medical examination at a health care facility  The patient is up-to-date on her mammogram and colonoscopy.  She is due for tetanus vaccine and an Adacel was ordered today.  She is otherwise up-to-date on her immunizations.  The patient exercises regularly and eats a healthy diet.         Subjective:      Kalpana Manzo is a 77 y.o. female who presents for an annual exam. The patient reports that she does have frequent urination intermittently during the night waking sometimes 2-3 times. She feels fine during the day.     Healthy Habits:   Regular Exercise: Yes  Sunscreen Use: Yes  Healthy Diet: Yes  Dental Visits Regularly: Yes  Seat Belt: Yes  Colonoscopy: 2014  Lipid Profile: Yes  Glucose Screen: Yes  Prevention of Osteoporosis: Yes  Last Dexa: 2016      Immunization History   Administered Date(s) Administered     DT (pediatric) 08/01/1997     Hep A, historic 05/14/2007, 05/19/2008     Influenza, inj, historic 10/02/2007, 10/21/2008, 10/02/2010, 10/08/2013, 10/06/2015     Influenza, seasonal,quad inj 6-35 mos 09/15/2009     Pneumo Conj 13-V (2010&after) 07/02/2015     Pneumo Polysac 23-V 02/07/2001, 05/19/2008     Td, historic 05/14/2007     Typhoid, historic 08/21/2007     ZOSTER 10/22/2009      Immunization status: up to date and documented.    Gynecologic History  No LMP recorded (lmp unknown). Patient is postmenopausal.  Contraception: post menopausal status  Last Pap: N/A.   Last mammogram: 2017. Results were: normal      OB History    Para Term  AB Living   1 1 1      SAB TAB Ectopic Multiple Live Births             # Outcome Date GA Lbr Adiel/2nd Weight Sex Delivery Anes PTL Lv   1 Term                   Current Outpatient Prescriptions   Medication Sig Dispense Refill     cholecalciferol, vitamin D3, (VITAMIN D3) 2,000 unit Tab        GLUCOSAM HCL/CHONDRO MONTES A/C/MN (GLUCOSAMINE-CHONDROITIN COMPLX ORAL) Take 1 tablet by mouth daily. Tablet       multivitamin capsule Take 1 capsule by mouth daily.       omega-3 fatty acids (FISH OIL CONCENTRATE) 1,000 mg cap        simvastatin (ZOCOR) 10 MG tablet TAKE ONE TABLET BY MOUTH ONE TIME DAILY 90 tablet 3     ciprofloxacin HCl (CIPRO) 250 MG tablet Take 1 tablet PO postcoitally PRN 15 tablet 1     sulfamethoxazole-trimethoprim (SEPTRA DS) 800-160 mg per tablet Take 1 tablet by mouth 2 (two) times a day for 5 days. 10 tablet 0     No current facility-administered medications for this visit.      No past medical history on file.  Past Surgical History:   Procedure Laterality Date     BREAST BIOPSY Left      BREAST BIOPSY Right      SD APPENDECTOMY      Description: Appendectomy;  Recorded: 2008;     SD BIOPSY OF BREAST, INCISIONAL      Description: Biopsy Breast Open;  Recorded: 2008;     SD REMOVE TONSILS/ADENOIDS,<11 Y/O      Description: Tonsillectomy With Adenoidectomy;  Recorded: 2008;     SD REVISE SECONDARY VARICOSITY      Description: Varicose Vein Ligation;  Recorded: 2008;     Review of patient's allergies indicates no known allergies.  No family history on file.  Social History     Social History     Marital status:      Spouse name: N/A     Number of children: N/A     Years of education: N/A  "    Occupational History     Not on file.     Social History Main Topics     Smoking status: Never Smoker     Smokeless tobacco: Never Used     Alcohol use Not on file     Drug use: Not on file     Sexual activity: Not on file     Other Topics Concern     Not on file     Social History Narrative       Review of Systems  General:  Denies problem  Eyes: Denies problem  Ears/Nose/Throat: Denies problem  Cardiovascular: Denies problem  Respiratory:  Denies problem  Gastrointestinal:  Denies problem, Genitourinary: Denies problem  Musculoskeletal:  Denies problem  Skin: Denies problem  Neurologic: Denies problem  Psychiatric: Denies problem  Endocrine: Denies problem  Heme/Lymphatic: Denies problem   Allergic/Immunologic: Denies problem        Objective:         Vitals:    07/24/17 0740   BP: 122/74   Pulse: 66   SpO2: 99%   Weight: 156 lb 6.4 oz (70.9 kg)   Height: 5' 6\" (1.676 m)     Body mass index is 25.24 kg/(m^2).    Physical Exam:  General Appearance: Alert, cooperative, no distress, appears stated age  Head: Normocephalic, without obvious abnormality, atraumatic  Eyes: PERRL, conjunctiva/corneas clear, EOM's intact  Ears: Normal TM's and external ear canals, both ears  Nose: Nares normal, septum midline,mucosa normal, no drainage  Throat: Lips, mucosa, and tongue normal; teeth and gums normal  Neck: Supple, symmetrical, trachea midline, no adenopathy;  thyroid: not enlarged, symmetric, no tenderness/mass/nodules; no carotid bruit or JVD  Back: Symmetric, no curvature, ROM normal, no CVA tenderness  Lungs: Clear to auscultation bilaterally, respirations unlabored  Breasts: No breast masses, tenderness, asymmetry, or nipple discharge.  Heart: Regular rate and rhythm, S1 and S2 normal, no murmur, rub, or gallop, Abdomen: Soft, non-tender, bowel sounds active all four quadrants,  no masses, no organomegaly  Pelvic:Normal bimanual exam  Extremities: Extremities normal, atraumatic, no cyanosis or edema  Skin: Skin " color, texture, turgor normal, no rashes or lesions  Lymph nodes: Cervical, supraclavicular, and axillary nodes normal  Neurologic: Normal

## 2021-06-12 NOTE — PROGRESS NOTES
"Assessment:     1. Urinary tract infection, site unspecified         Return if symptoms worsen or fail to improve.    Plan:     Stop taking current antibiotics as culture did come back negative and she is asymptomatic.  Did add Bactrim to her allergy list for intolerance.      Subjective:       77 y.o. female presents for evaluation follow-up from recent urgent care visit.  Last week patient gave a urine sample and had possibility of having urinary tract infection.  She was asymptomatic at the time.  She started taking Bactrim.  She started getting headaches and some mild hives.  She went to the emergency room for evaluation and determined to be intolerant to Bactrim.  She was switched over to Cipro.  She started on the Cipro.  Now she is getting some reflux type symptoms every time she takes a tab.  No diarrhea.  No fevers or chills.  Denies any burning with urination or increased frequency or difficulty urinating.    The following portions of the patient's history were reviewed and updated as appropriate: allergies, current medications, past family history, past medical history, past social history, past surgical history and problem list.    Review of Systems  A 12 point comprehensive review of systems was negative except as noted.     History   Smoking Status     Never Smoker   Smokeless Tobacco     Never Used       Objective:      BP 98/60 (Patient Site: Left Arm, Patient Position: Sitting, Cuff Size: Adult Large)  Pulse 84  Temp 98.7  F (37.1  C) (Oral)   Resp 12  Ht 5' 6\" (1.676 m)  Wt 158 lb 11.2 oz (72 kg)  LMP  (LMP Unknown)  Breastfeeding? No  BMI 25.61 kg/m2  General appearance: alert, appears stated age and cooperative  Head: Normocephalic, without obvious abnormality, atraumatic       This note has been dictated using voice recognition software. Any grammatical or context distortions are unintentional and inherent to the software  "

## 2021-06-16 PROBLEM — N81.11 MIDLINE CYSTOCELE: Status: ACTIVE | Noted: 2020-07-06

## 2021-06-16 PROBLEM — J30.2 SEASONAL ALLERGIC RHINITIS: Status: ACTIVE | Noted: 2021-05-17

## 2021-06-16 PROBLEM — I83.90 VARICOSE VEIN OF LEG: Status: ACTIVE | Noted: 2018-08-06

## 2021-06-17 NOTE — PATIENT INSTRUCTIONS - HE
Patient Instructions by Mela López MD at 8/12/2019  8:00 AM     Author: Mela López MD Service: -- Author Type: Physician    Filed: 8/12/2019  8:08 AM Encounter Date: 8/12/2019 Status: Signed    : Mela López MD (Physician)         Patient Education   Understanding USDA MyPlate  The USDA (US Department of Agriculture) has guidelines to help you make healthy food choices. These are called MyPlate. MyPlate shows the food groups that make up healthy meals using the image of a place setting. Before you eat, think about the healthiest choices for what to put onto your plate or into your cup or bowl. To learn more about building a healthy plate, visit www.choosemyplate.gov.       The Food Groups    Fruits: Any fruit or 100% fruit juice counts as part of the Fruit Group. Fruits may be fresh, canned, frozen, or dried, and may be whole, cut-up, or pureed. Make half your plate fruits and vegetables.    Vegetables: Any vegetable or 100% vegetable juice counts as a member of the Vegetable Group. Vegetables may be fresh, frozen, canned, or dried. They can be served raw or cooked and may be whole, cut-up, or mashed. Make half your plate fruits and vegetables.     Grains: All foods made from grains are part of the Grains Group. These include wheat, rice, oats, cornmeal, and barley such as bread, pasta, oatmeal, cereal, tortillas, and grits. Grains should be no more than a quarter of your plate. At least half of your grains should be whole grains.    Protein: This group includes meat, poultry, seafood, beans and peas, eggs, processed soy products (like tofu), nuts (including nut butters), and seeds. Make protein choices no more than a quarter of your plate. Meat and poultry choices should be lean or low fat.    Dairy: All fluid milk products and foods made from milk that contain calcium, like yogurt and cheese are part of the Dairy Group. (Foods that have little calcium, such as cream, butter, and  cream cheese, are not part of the group.) Most dairy choices should be low-fat or fat-free.    Oils: These are fats that are liquid at room temperature. They include canola, corn, olive, soybean, and sunflower oil. Foods that are mainly oil include mayonnaise, certain salad dressings, and soft margarines. You should have only 5 to 7 teaspoons of oils a day. You probably already get this much from the food you eat.  Use [employer] to Help Build Your Meals  The [employer] can help you plan and track your meals and activity. You can look up individual foods to see or compare their nutritional value. You can get guidelines for what and how much you should eat. You can compare your food choices. And you can assess personal physical activities and see ways you can improve. Go to www.[place].gov/HealthSpotcker/.    6439-9081 [employer]. [address] [place] [address]. All rights reserved. This information is not intended as a substitute for professional medical care. Always follow your healthcare professional's instructions.           Patient Education   Signs of Hearing Loss  Hearing loss is a problem shared by many people. In fact, it is one of the most common health conditions, particularly as people age. Most people over age 65 have some hearing loss, and by age 80, almost everyone does. Because hearing loss usually occurs slowly over the years, you may not realize your hearing ability has gotten worse.       Have your hearing checked  Contact your [facility] care provider if you:    Have to strain to hear normal conversation.    Have to watch other peoples faces very carefully to follow what theyre saying.    Need to ask people to repeat what theyve said.    Often misunderstand what people are saying.    Turn the volume of the television or radio up so high that others complain.    Feel that people are mumbling when theyre talking to you.    Find that the effort to hear leaves you feeling  tired and irritated.    Notice, when using the phone, that you hear better with 1 ear than the other.    9127-8835 [employer]. [address] [place] [address], [place] [address]. All rights reserved. This information is not intended as a substitute for professional medical care. Always follow your healthcare professional's instructions.           Advance Directive  Patients advance directive was discussed and I am comfortable with the patients wishes.  Patient Education   Personalized Prevention Plan  You are due for the preventive services outlined below.  Your care team is available to assist you in scheduling these services.  If you have already completed any of these items, please share that information with your care team to update in your medical record.  Health Maintenance   Topic Date Due   ? ZOSTER VACCINES (2 of 3) 12/17/2009   ? INFLUENZA VACCINE RULE BASED (1) 08/01/2019   ? MEDICARE ANNUAL WELLNESS VISIT  08/06/2019   ? FALL RISK ASSESSMENT  06/24/2020   ? DXA SCAN  08/24/2020   ? MAMMOGRAM  07/15/2021   ? ADVANCE CARE PLANNING  05/13/2024   ? COLONOSCOPY  08/12/2024   ? TD 18+ HE  07/24/2027   ? PNEUMOCOCCAL POLYSACCHARIDE VACCINE AGE 65 AND OVER  Completed   ? PNEUMOCOCCAL CONJUGATE VACCINE FOR ADULTS (PCV13 OR PREVNAR)  Completed

## 2021-06-17 NOTE — TELEPHONE ENCOUNTER
Telephone Encounter by Gissell Guido MD at 3/13/2021  3:01 PM     Author: Gissell Guido MD Service: -- Author Type: Physician    Filed: 3/13/2021  3:01 PM Encounter Date: 3/13/2021 Status: Signed    : Gissell Guido MD (Physician)    From: Kalpana Manzo  Sent: 3/7/2021  6:40 PM CST  To: Gissell Guido MD  Subject: RE:covid vaccine appointments available - please schedule    I have already received both of the vaccines.   Moderna COVID-19 on   February 6 and March 2.  Please inform Dr. López.   Thank you.   Kalpana Manzo

## 2021-06-17 NOTE — PROGRESS NOTES
Assessment/Plan:     Problem List Items Addressed This Visit        [employer]g Concept Cardiac Problems    Hyperlipidemia    Relevant Medications    simvastatin (ZOCOR) 10 MG tablet    Seasonal allergic rhinitis       Other    Osteopenia    Relevant Medications    alendronate (FOSAMAX) 70 MG tablet      Other Visit Diagnoses     Callus of foot    -  Primary    Onychomycosis            I counseled the patient regarding the callus which is at the head of the fifth metatarsal.  I recommended shoes that have good cushion or inserts to help offset the pressure point and discussed care of the callus.  The patient has fungal changes involving her toenail of concern and I think this is the source of the material they have been finding just under the nail.  The patient would like to give a trial to a topical nasal steroid as a friend had found this to be quite effective for allergies and I discussed recommendations there and using combination with Zyrtec or alone.  She will be returning for an annual wellness visit in July at which time I will address preventive cares and she will be due for a bone density test in September.    Subjective:       81 y.o. female presents today regarding a few questions.  Overall the patient states that she is doing very well despite recently turning 81 and overall feels quite healthy.  She has a few concerns to address today including a lump which is occasionally painful when she walks with bare feet involving her left foot around the lateral aspect.  She also reports that there has been some debris or that they are digging something out from under her great toenail on her left foot.  Lastly, she wants my opinion regarding nasal steroids for treating allergy symptoms.      Reviewed: The following portions of the patient's history were reviewed and updated as appropriate: allergies, current medications, past family history, past medical history, past social history, past surgical history and problem  list.    Review of Systems  Pertinent items are noted in HPI.        Objective:     /78 (Patient Site: Left Arm, Patient Position: Sitting, Cuff Size: Adult Regular)   Pulse 77   Wt 160 lb 12.8 oz (72.9 kg)   LMP  (LMP Unknown)   SpO2 98%   BMI 25.95 kg/m    General appearance: alert, appears stated age and cooperative  Left foot: mild callus over head of 5th metatarsal; yellow changes involving medial portion of great nail.a      This note has been dictated using voice recognition software. Any grammatical or context distortions are unintentional and inherent to the software

## 2021-06-18 NOTE — PROGRESS NOTES
Radiofrequency Abilation prior authorization has been faxed to the patient's insurance company.

## 2021-06-18 NOTE — PROGRESS NOTES
Referred by Dr López for VV. 8/2006 right leg stab phlebectomy with Dr. SANKET Wolf. Hx of vein surgery in 1987. right thigh pain VV.Has used compression for years with no help. R>L VV. Right GSV RFA option reviewed after US today. Leonard lowry.

## 2021-06-19 NOTE — PROGRESS NOTES
Assessment and Plan:     1. Routine general medical examination at a health care facility  The patient is overall doing well.  She is up-to-date on all of her cancer screening.  The patient was counseled regarding the new shingles vaccine which was recommended and she is going to try to get that through her pharmacy.    2. Hyperlipidemia  Refill provided of her simvastatin.    3. Osteopenia  - Renal Function Profile    4. Prediabetes  - Glycosylated Hemoglobin A1c    5. Hyperlipidemia, unspecified hyperlipidemia type  - simvastatin (ZOCOR) 10 MG tablet; Take 1 tablet (10 mg total) by mouth daily.  Dispense: 90 tablet; Refill: 3  - Lipid Profile    6. Varicose vein of leg  Encouraged patient to reschedule VNUS        The patient's current medical problems were reviewed.    I have had an Advance Directives discussion with the patient.  The following health maintenance schedule was reviewed with the patient and provided in printed form in the after visit summary:   Health Maintenance   Topic Date Due     INFLUENZA VACCINE RULE BASED (1) 08/01/2018     DXA SCAN  08/02/2018     FALL RISK ASSESSMENT  08/06/2019     MAMMOGRAM  07/11/2020     ADVANCE DIRECTIVES DISCUSSED WITH PATIENT  07/18/2021     COLONOSCOPY  08/12/2024     TD 18+ HE  07/24/2027     PNEUMOCOCCAL POLYSACCHARIDE VACCINE AGE 65 AND OVER  Completed     PNEUMOCOCCAL CONJUGATE VACCINE FOR ADULTS (PCV13 OR PREVNAR)  Completed     ZOSTER VACCINE  Completed        Subjective:   Chief Complaint: Kalpana Manzo is an 78 y.o. female here for an Annual Wellness visit.     HPI:  Kalpana is a very pleasant 78-year-old female presenting today for an annual wellness visit.  The patient has been through very stressful past month as her  was diagnosed with myasthenia gravis and was recently hospitalized and intubated due to acute myasthenia crisis.  He is doing better at this time and she is very relieved about that.  She does not typically struggle with symptoms of  depression.  The patient canceled a VNUS procedure that was to be done tomorrow and is planning on getting that rescheduled.  She has no other complaints or concerns and otherwise feels well at today's visit.    Review of Systems:  Please see above.  The rest of the review of systems are negative for all systems.    Patient Care Team:  Mela López MD as PCP - General     Patient Active Problem List   Diagnosis     Hepatitis     Basal Cell Carcinoma Of The Skin     Urinary Frequency More Than Twice At Night (Nocturia)     Hyperlipidemia     Onychomycosis     Osteopenia     Osteoarthritis Of The Knee     Eczema     Limb Swelling     Prediabetes     Varicose vein of leg     Past Medical History:   Diagnosis Date     Basal Cell Carcinoma Of The Skin     Created by Conversion  Replacement Utility updated for latest IMO load     Eczema     Created by Conversion      Hepatitis     Created by Conversion [facility] Annotation: Aug 22 2011  9:09AM - Mela López: in the 1980's  unspecified  Replacement Utility updated for latest IMO load     Hyperlipidemia     Created by Conversion      Limb Swelling     Created by Conversion      Onychomycosis     Created by Conversion      Osteoarthritis Of The Knee     Created by Conversion  Replacement Utility updated for latest IMO load     Osteopenia     Created by Conversion  Replacement Utility updated for latest IMO load     Prediabetes 7/18/2016     Urinary Frequency More Than Twice At Night (Nocturia)     Created by Conversion       Past Surgical History:   Procedure Laterality Date     BREAST BIOPSY Left 1987     BREAST BIOPSY Right 1992     NM APPENDECTOMY      Description: Appendectomy;  Recorded: 05/19/2008;     NM BIOPSY OF BREAST, INCISIONAL      Description: Biopsy Breast Open;  Recorded: 05/19/2008;     NM REMOVE TONSILS/ADENOIDS,<13 Y/O      Description: Tonsillectomy With Adenoidectomy;  Recorded: 05/19/2008;     NM REVISE SECONDARY VARICOSITY       "Description: Varicose Vein Ligation;  Recorded: 05/19/2008;     TOTAL KNEE ARTHROPLASTY Right 08/2008      Family History   Problem Relation Age of Onset     Varicose Veins Mother       Social History     Social History     Marital status:      Spouse name: N/A     Number of children: N/A     Years of education: N/A     Occupational History     Not on file.     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 Tablet       multivitamin capsule Take 1 capsule by mouth daily.       omega-3 fatty acids (FISH OIL CONCENTRATE) 1,000 mg cap        simvastatin (ZOCOR) 10 MG tablet Take 1 tablet (10 mg total) by mouth daily. 90 tablet 3     No current facility-administered medications for this visit.       Objective:   Vital Signs:   Visit Vitals     /72 (Patient Site: Left Arm, Patient Position: Sitting, Cuff Size: Adult Regular)     Pulse 70     Ht 5' 6\" (1.676 m)     Wt 153 lb 8 oz (69.6 kg)     LMP  (LMP Unknown)     BMI 24.78 kg/m2          PHYSICAL EXAM  Physical Exam   Constitutional: She is oriented to person, place, and time.   HENT:   Head: Normocephalic and atraumatic.   Mouth/Throat: Oropharynx is clear and moist.   Eyes: Pupils are equal, round, and reactive to light.   Cardiovascular: Normal rate, regular rhythm and normal heart sounds.    Pulmonary/Chest: Effort normal and breath sounds normal. Right breast exhibits no mass. Left breast exhibits no mass.   Abdominal: Soft. She exhibits no mass.   Lymphadenopathy:     She has no " cervical adenopathy.     She has no axillary adenopathy.   Neurological: She is alert and oriented to person, place, and time.   Psychiatric: She has a normal mood and affect.   Nursing note and vitals reviewed.      Assessment Results 8/6/2018   Activities of Daily Living No help needed   Instrumental Activities of Daily Living No help needed   Mini Cog Total Score 4   Some recent data might be hidden     A Mini-Cog score of 0-2 suggests the possibility of dementia, score of 3-5 suggests no dementia    Identified Health Risks:     The patient was counseled and encouraged to consider modifying their diet and eating habits. She was provided with information on recommended healthy diet options.  Patient's advanced directive was discussed and I am comfortable with the patient's wishes.

## 2021-06-20 NOTE — LETTER
Letter by Kenya Barker AuD at      Author: Kenya Barker AuD Service: -- Author Type: --    Filed:  Encounter Date: 7/21/2020 Status: (Other)       [facility]- Audiology Benefit Letter    ERIN REECE  1940  [address] [facility] [address]    Insurance Company:  BC MEDICARE ADVANTAGE CHOICE [employer]    MA Product: No    ID # :  [id number]    Group#:  [id number]    Estimate of Benefits  Consult Visit Benefits:  BC MEDICARE ADV 07/2020 AUGUST WONG, HAF, HAC Benefits:   NOT COVERED, NEED TO GO THROUGH [employer]     Batteries/Accessories Coverage:  NOT COVERED, NEED TO GO THROUGH [employer]    Representative name: RTE  Call reference:   Date of contact: 7/21/2020    Insurance verified today by FARHEEN ROJO    Additional Information:  [email or web address]    The information provided is an estimate of benefits. This does not guarantee coverage; the insurance company will make the final determination of coverage to include patient responsibility of payment by the patient.   [facility] is not responsible for the decisions made by the insurance company regarding coverage.  Any changes to coverage (new plan or new policy) or procedures may void the contents provided in this letter.     Term Definitions  Patient responsibility: Can include but not limited to: co-pays, co-insurance, deductibles, out-of-pocket and non-covered and/or policy exclusions.

## 2021-06-20 NOTE — PROGRESS NOTES
Post Procedure Note Endovenous Closure    S: Kalpana Manzo is a 78 y.o. female S/P Right  leg endovenous closure ofRight lower ext. Two weeks out from procedure. Doing well, wore female  thigh high socks. Minimal discomfort. Some superficial phlibitis. Which is resolving.     O:   Vitals:    09/19/18 0853   BP: 120/70   Pulse: 72   Resp: 16   Temp: 97.9  F (36.6  C)       General: no apparent distress  Legs look good no signs of infection, incisions healing nicely.    Imaging:    US Venous Post Ablation Leg Right (Order [id number])   Imaging   Date: 8/30/2018 Department: [facility] Ultrasound [place] Released By: Cassidy Marr Authorizing: Geovani Guidry MD   Study Result   [facility]  ULTRASOUND EXTREMITY VEINS UNILATERAL  8/30/2018 10:16 AM      INDICATION: Pain. Symptomatic right lower extremity varicose veins. Endovenous ablation of right great saphenous vein.      TECHNIQUE: Duplex images were obtained with two-dimensional images, Doppler waveform analysis, and color-flow imaging. Images were obtained of the opposite common femoral vein for comparison.     FINDINGS:     RIGHT LEG:      The deep venous system demonstrated normal phasicity, augmentation, and patency. No evidence of DVT.      The right  great saphenous vein is noncompressible from the proximal thigh to the distal thigh.        IMPRESSION:   1.  No evidence of DVT following endovenous ablation of saphenous vein.         A/P: S/p endovenous closure. For insuffiencey of veins     May switch to knee high support socks   Resume all activities   RTC 4 months   Call for any questions or concerns     Geovani Guidry MD   [facility] Surgery

## 2021-06-20 NOTE — PROGRESS NOTES
Assessment:     1. Osteopenia     2. Hyperlipidemia, unspecified hyperlipidemia type  simvastatin (ZOCOR) 10 MG tablet   3. Anxiety         Plan:     Kalpana is a 78-year-old female presenting today for evaluation of stress and anxiety.  Overall she does sound as though she is handling the stress well and has a lot of good support.  No intervention is indicated at this time.  We discussed her recent bone density test results and recommendations for active treatment.  A prescription for Fosamax once weekly was provided and I thoroughly discussed how to take the medication in addition to potential risks and complications.  The patient was due for refill on her Zocor and a prescription was provided today.  Follow-up in a little less than 1 year for her next annual exam.    Subjective:       78 y.o. female presents for an evaluation of mental health concerns.  The patient came along to her  last appointment and was quite tearful and expressed a lot of stress and with that frequent headaches since her 's diagnosis of myasthenia gravis.  The patient is in today and states that since that appointment, she overall has been doing better with her mental health.  She thinks part of this is that the neurology appointment recently went very well and she is feeling some more optimism.  The patient states that she has been under a fair amount of stress and has moments and days when she feels more tearful.  However, this is not a pervasive feeling and she has maintained close engagement with her family, friends as well as good support through her Hinduism.  The patient really denies any symptoms of depression.  She and her  are planning on heading down to [place] for the winter but will leave in November after their next appointment with neurology.  She is also here to discuss her bone density test.  The patient had asymmetry found between her 2 hips and this was retested just this morning.  However, this is within  the margin of air and is felt to be valid.  I appreciate Dr. Mchugh's input as well that with her lowest T score she is considered at high risk of fracture and would benefit from active treatment.      Reviewed: The following portions of the patient's history were reviewed and updated as appropriate: allergies, current medications, past family history, past medical history, past social history, past surgical history and problem list.    Review of Systems  Pertinent items are noted in HPI.       Objective:     /78 (Patient Site: Left Arm, Patient Position: Sitting, Cuff Size: Adult Regular)  Pulse 68  Wt 153 lb (69.4 kg)  LMP  (LMP Unknown)  BMI 24.69 kg/m2  General appearance: alert, appears stated age and cooperative      This note has been dictated using voice recognition software. Any grammatical or context distortions are unintentional and inherent to the software

## 2021-06-26 NOTE — PROGRESS NOTES
Progress Notes by Geovani Guidry MD at 6/5/2018  8:20 AM     Author: Geovani Guidry MD Service: -- Author Type: Physician    Filed: 6/5/2018  9:37 AM Encounter Date: 6/5/2018 Status: Signed    : Geovani Guidry MD (Physician)       [facility] Vein Consult      Assessment:     1. varicose veins, bilateral   2. spider veins, bilateral   3. Insuffiencey of right greater saphenous vein  Plan:     1. Treatment options of conservative therapy of stockings use, exercise, weight loss,  elevating legs when possible.    2. Script for compression stockings 20-30 mm hg  3. Ultrasound to evaluate legs for incompetency of both deep and superficial system .   4. Surgical treatment   Endovenous closure ofright, greater saphenous vein   Risks and benefits of surgical intervention including infection, burns, dvt,  thrombophlebitis, not closing, recurrence, numbness and nerve injury and need  for further intervention were all discused    5. Follow up: for procedure or prn .   6. Call for any questions concerns or issues    Subjective:      Kalpana Manzo is a 78 y.o. female  who was referred by Mela López MD  for evaluation of varicose veins. Symptoms include pain, aching, fatigue, burning, edema and dermatitis. Patient has history of leg selling, pain and vein issues that have progressed. Pain and symptoms have affected daily living and work activities needing medications. Here for evaluation today. Stocking use with compression stockings of 20-30 mm hg or greater for greater then 3 months    Allergies:Bactrim [sulfamethoxazole-trimethoprim]    Past Medical History:   Diagnosis Date   ? Basal Cell Carcinoma Of The Skin     Created by Conversion  Replacement Utility updated for latest IMO load   ? Eczema     Created by Conversion    ? Hepatitis     Created by Conversion [facility] Annotation: Aug 22 2011  9:09AM - Mela López: in the 1980's  unspecified  Replacement Utility updated for latest IMO load    ? Hyperlipidemia     Created by Conversion    ? Limb Swelling     Created by Conversion    ? Onychomycosis     Created by Conversion    ? Osteoarthritis Of The Knee     Created by Conversion  Replacement Utility updated for latest IMO load   ? Osteopenia     Created by Conversion  Replacement Utility updated for latest IMO load   ? Prediabetes 7/18/2016   ? Urinary Frequency More Than Twice At Night (Nocturia)     Created by Conversion        Past Surgical History:   Procedure Laterality Date   ? BREAST BIOPSY Left 1987   ? BREAST BIOPSY Right 1992   ? OK APPENDECTOMY      Description: Appendectomy;  Recorded: 05/19/2008;   ? OK BIOPSY OF BREAST, INCISIONAL      Description: Biopsy Breast Open;  Recorded: 05/19/2008;   ? OK REMOVE TONSILS/ADENOIDS,<11 Y/O      Description: Tonsillectomy With Adenoidectomy;  Recorded: 05/19/2008;   ? OK REVISE SECONDARY VARICOSITY      Description: Varicose Vein Ligation;  Recorded: 05/19/2008;   ? TOTAL KNEE ARTHROPLASTY Right 08/2008       Current Outpatient Prescriptions   Medication Sig   ? cholecalciferol, vitamin D3, (VITAMIN D3) 2,000 unit Tab    ? GLUCOSAM HCL/CHONDRO MONTES A/C/MN (GLUCOSAMINE-CHONDROITIN COMPLX ORAL) Take 1 tablet by mouth daily. Tablet   ? multivitamin capsule Take 1 capsule by mouth daily.   ? omega-3 fatty acids (FISH OIL CONCENTRATE) 1,000 mg cap    ? simvastatin (ZOCOR) 10 MG tablet TAKE ONE TABLET BY MOUTH ONE TIME DAILY       Family History   Problem Relation Age of Onset   ? Varicose Veins Mother         reports that she has never smoked. She has never used smokeless tobacco. She reports that she drinks alcohol. She reports that she does not use illicit drugs.      Review of Systems  Pertinent items are noted in HPI.  A 12 point comprehensive review of systems was negative except as noted.      Objective:     Vitals:    06/05/18 0822   BP: 118/80   Pulse: 74   Resp: 12   Temp: 97.8  F (36.6  C)   TempSrc: Oral     There is no height or weight on file  to calculate BMI.    EXAM:  GENERAL: This is a well-developed 78 y.o. female who appears her stated age  HEAD: normocephalic  HEENT: Pupils equal and reactive bilaterally  MOUTH: mucus membranes intact. Normal dentation  CARDIAC: RRR without murmur  CHEST/LUNG:  Clear to auscultation bilaterally  ABDOMEN: Soft, nontender, nondistended, no masses noted   NEUROLOGIC: Focally intact, nonfocal, alert and oriented x 3  INTEGUMENT: No open lesions or ulcers  VASCULAR: Pulses intact, symmetrical upper and lower extremities. There areskin changes consistent with chronic venous insufficiency. Varicose veins present in bilateral greater saphenous distribution. Spider veins present bilateral.                Imaging:    US Venous Insufficiency Legs Bilateral (Order [id number])   Imaging   Date: 6/5/2018 Department: [facility] Released By: Andrés Diane RDMS, RVT Authorizing: Geovani Guidry MD   Study Result   BILATERAL LOWER EXTREMITY VENOUS DUPLEX ULTRASOUND WITH INSUFFICIENCY STUDY  6/5/2018 9:09 AM     INDICATIONS: Symptomatic varicose veins. Leg pain and swelling. History of stab phlebectomy.     TECHNIQUE: Venous duplex ultrasound with gray-scale images, graded compression, and color-flow, Doppler, and spectral analysis. Abnormal reflux is defined as 600 msec for superficial veins, 350 msec for perforating veins, and 1 sec for deep veins.  COMPARISONS: None.     FINDINGS: The right greater saphenous vein is incompetent, with abnormal sustained reflux from the saphenofemoral junction to the knee. This vein measures 8 mm in diameter at the saphenofemoral junction, between 7 and 5 mm from proximal thigh to knee,   and less than 2 mm in the calf. No abnormal reflux is detected in the right lesser saphenous vein.     No abnormal reflux is detected in the left greater or lesser saphenous veins.     No significant deep venous reflux is detected, and no incompetent perforating veins are  identified.     There is no evidence for deep vein thrombosis. There is normal flow and compressibility in the femoral, popliteal, and posterior tibial veins. Right and left common femoral veins demonstrate similar waveforms.     CONCLUSIONS:   1.  Incompetent right greater saphenous vein.               Geovani Guidry MD  [facility] Surgery Dept.

## 2021-06-27 ENCOUNTER — HEALTH MAINTENANCE LETTER (OUTPATIENT)
Age: 81
End: 2021-06-27

## 2021-06-29 NOTE — PROGRESS NOTES
"Progress Notes by Kenya Barker AuD at 7/21/2020  9:00 AM     Author: Kenya Barker AuD Service: -- Author Type: Audiologist    Filed: 7/21/2020  9:58 AM Encounter Date: 7/21/2020 Status: Signed    : Kenya Barker AuD (Audiologist)       Audiology only; referred by Mela López    Summary:  Audiology visit completed. Please see audiogram below or under \"media\" tab for history and results.    Transducer:  Both insert phones and circumaural headphones were used.    Reliability:    Good    Recommendations:  Follow-up with PCP; ENT referral recommended due to asymmetrical hearing loss (scheduled with Tom Evans MD on 7-30-20). Retest hearing annually (to monitor) or per medical management/patient concern.  Wear hearing protection consistently in noise to preserve residual hearing sensitivity.  Kalpana Manzo is a potential binaural amplification candidate, if patient motivation exists and medical clearance is granted.    Pantera Martinez, [facility]  [place] Licensed Audiologist [id number]           "

## 2021-07-03 NOTE — ADDENDUM NOTE
Addendum Note by Cooper Calhoun RN at 8/22/2018  3:21 PM     Author: Cooper Calhoun RN Service: -- Author Type: Registered Nurse    Filed: 8/22/2018  3:21 PM Encounter Date: 6/5/2018 Status: Signed    : Cooper Calhoun RN (Registered Nurse)    Addended by: COOPER CALHOUN on: 8/22/2018 03:21 PM        Modules accepted: Orders, SmartSet

## 2021-07-03 NOTE — ADDENDUM NOTE
Addendum Note by Mela López MD at 8/6/2018  1:41 PM     Author: Mela López MD Service: -- Author Type: Physician    Filed: 8/6/2018  1:41 PM Encounter Date: 8/6/2018 Status: Signed    : Mela López MD (Physician)    Addended by: MELA LÓPEZ on: 8/6/2018 01:41 PM        Modules accepted: Orders

## 2021-07-13 ENCOUNTER — RECORDS - HEALTHEAST (OUTPATIENT)
Dept: ADMINISTRATIVE | Facility: CLINIC | Age: 81
End: 2021-07-13

## 2021-07-19 ENCOUNTER — HOSPITAL ENCOUNTER (OUTPATIENT)
Dept: MAMMOGRAPHY | Facility: CLINIC | Age: 81
Discharge: HOME | End: 2021-07-19
Attending: FAMILY MEDICINE | Admitting: FAMILY MEDICINE
Payer: COMMERCIAL

## 2021-07-19 DIAGNOSIS — Z12.31 VISIT FOR SCREENING MAMMOGRAM: ICD-10-CM

## 2021-07-19 PROCEDURE — 77063 BREAST TOMOSYNTHESIS BI: CPT

## 2021-07-21 ENCOUNTER — RECORDS - HEALTHEAST (OUTPATIENT)
Dept: ADMINISTRATIVE | Facility: CLINIC | Age: 81
End: 2021-07-21

## 2021-08-30 ENCOUNTER — OFFICE VISIT (OUTPATIENT)
Dept: FAMILY MEDICINE | Facility: CLINIC | Age: 81
End: 2021-08-30
Payer: COMMERCIAL

## 2021-08-30 VITALS
WEIGHT: 157 LBS | OXYGEN SATURATION: 97 % | SYSTOLIC BLOOD PRESSURE: 136 MMHG | HEART RATE: 72 BPM | BODY MASS INDEX: 24.64 KG/M2 | HEIGHT: 67 IN | DIASTOLIC BLOOD PRESSURE: 74 MMHG

## 2021-08-30 DIAGNOSIS — M94.9 DISORDER OF BONE AND CARTILAGE: ICD-10-CM

## 2021-08-30 DIAGNOSIS — Z78.0 POSTMENOPAUSAL STATUS: ICD-10-CM

## 2021-08-30 DIAGNOSIS — E78.5 HYPERLIPIDEMIA, UNSPECIFIED HYPERLIPIDEMIA TYPE: ICD-10-CM

## 2021-08-30 DIAGNOSIS — L84 CALLUS OF FOOT: ICD-10-CM

## 2021-08-30 DIAGNOSIS — Z00.00 ENCOUNTER FOR MEDICARE ANNUAL WELLNESS EXAM: Primary | ICD-10-CM

## 2021-08-30 DIAGNOSIS — R73.03 PREDIABETES: ICD-10-CM

## 2021-08-30 DIAGNOSIS — M89.9 DISORDER OF BONE AND CARTILAGE: ICD-10-CM

## 2021-08-30 LAB
ALBUMIN SERPL-MCNC: 4 G/DL (ref 3.5–5)
ALP SERPL-CCNC: 57 U/L (ref 45–120)
ALT SERPL W P-5'-P-CCNC: 14 U/L (ref 0–45)
ANION GAP SERPL CALCULATED.3IONS-SCNC: 11 MMOL/L (ref 5–18)
AST SERPL W P-5'-P-CCNC: 19 U/L (ref 0–40)
BILIRUB SERPL-MCNC: 1.2 MG/DL (ref 0–1)
BUN SERPL-MCNC: 17 MG/DL (ref 8–28)
CALCIUM SERPL-MCNC: 9.8 MG/DL (ref 8.5–10.5)
CHLORIDE BLD-SCNC: 108 MMOL/L (ref 98–107)
CHOLEST SERPL-MCNC: 158 MG/DL
CO2 SERPL-SCNC: 27 MMOL/L (ref 22–31)
CREAT SERPL-MCNC: 1.04 MG/DL (ref 0.6–1.1)
FASTING STATUS PATIENT QL REPORTED: YES
GFR SERPL CREATININE-BSD FRML MDRD: 51 ML/MIN/1.73M2
GLUCOSE BLD-MCNC: 95 MG/DL (ref 70–125)
HBA1C MFR BLD: 5.7 %
HDLC SERPL-MCNC: 54 MG/DL
LDLC SERPL CALC-MCNC: 86 MG/DL
POTASSIUM BLD-SCNC: 4.4 MMOL/L (ref 3.5–5)
PROT SERPL-MCNC: 6.5 G/DL (ref 6–8)
SODIUM SERPL-SCNC: 146 MMOL/L (ref 136–145)
TRIGL SERPL-MCNC: 89 MG/DL

## 2021-08-30 PROCEDURE — 36415 COLL VENOUS BLD VENIPUNCTURE: CPT | Performed by: FAMILY MEDICINE

## 2021-08-30 PROCEDURE — 82306 VITAMIN D 25 HYDROXY: CPT | Performed by: FAMILY MEDICINE

## 2021-08-30 PROCEDURE — 80053 COMPREHEN METABOLIC PANEL: CPT | Performed by: FAMILY MEDICINE

## 2021-08-30 PROCEDURE — G0438 PPPS, INITIAL VISIT: HCPCS | Performed by: FAMILY MEDICINE

## 2021-08-30 PROCEDURE — 83036 HEMOGLOBIN GLYCOSYLATED A1C: CPT | Performed by: FAMILY MEDICINE

## 2021-08-30 PROCEDURE — 80061 LIPID PANEL: CPT | Performed by: FAMILY MEDICINE

## 2021-08-30 ASSESSMENT — MIFFLIN-ST. JEOR: SCORE: 1201.84

## 2021-08-30 ASSESSMENT — ACTIVITIES OF DAILY LIVING (ADL): CURRENT_FUNCTION: NO ASSISTANCE NEEDED

## 2021-08-30 NOTE — PROGRESS NOTES
"SUBJECTIVE:   Kalpana Manzo is a 81 year old female who presents for Preventive Visit.    Patient has been advised of split billing requirements and indicates understanding: Yes   Are you in the first 12 months of your Medicare coverage?  No    Healthy Habits:     In general, how would you rate your overall health?  Good    Frequency of exercise:  4-5 days/week    Duration of exercise:  15-30 minutes    Do you usually eat at least 4 servings of fruit and vegetables a day, include whole grains    & fiber and avoid regularly eating high fat or \"junk\" foods?  No    Medication side effects:  None    Ability to successfully perform activities of daily living:  No assistance needed    Home Safety:  No safety concerns identified    Hearing Impairment:  No hearing concerns    In the past 6 months, have you been bothered by leaking of urine? Yes    In general, how would you rate your overall mental or emotional health?  Good      PHQ-2 Total Score: 0    Additional concerns today:  Yes    Do you feel safe in your environment? Yes    Have you ever done Advance Care Planning? (For example, a Health Directive, POLST, or a discussion with a medical provider or your loved ones about your wishes): Yes, advance care planning is on file.       Fall risk     click delete button to remove this line now  Cognitive Screening   1) Repeat 3 items (Leader, Season, Table)    2) Clock draw: NORMAL  3) 3 item recall: Recalls 3 objects  Results: 3 items recalled: COGNITIVE IMPAIRMENT LESS LIKELY    Mini-CogTM Copyright S Emily. Licensed by the author for use in [facility]; reprinted with permission (lisa@.[facility]). All rights reserved.      Do you have sleep apnea, excessive snoring or daytime drowsiness?: no    Reviewed and updated as needed this visit by clinical staff    Meds              Reviewed and updated as needed this visit by Provider                Social History     Tobacco Use     Smoking status: Never Smoker     " "Smokeless tobacco: Never Used   Substance Use Topics     Alcohol use: Yes     Comment: Alcoholic Drinks/day: rare     If you drink alcohol do you typically have >3 drinks per day or >7 drinks per week? No    Alcohol Use 8/30/2021   Prescreen: >3 drinks/day or >7 drinks/week? No   Prescreen: >3 drinks/day or >7 drinks/week? -     Current providers sharing in care for this patient include:   Patient Care Team:  Mela López as PCP - General  Mela López as Assigned PCP  Tom Evans MD as Assigned Surgical Provider    The following health maintenance items are reviewed in Epic and correct as of today:  Health Maintenance Due   Topic Date Due     ANNUAL REVIEW OF HM ORDERS  Never done     MAMMO SCREENING  07/17/2021     INFLUENZA VACCINE (1) 09/01/2021     Lab work is in process  UTD on cancer screening; due for Dexa    Mammogram Screening - Patient over age 75, has elected to continue with screening.  Pertinent mammograms are reviewed under the imaging tab.    Review of Systems  Constitutional, HEENT, cardiovascular, pulmonary, gi and gu systems are negative, except as otherwise noted.    OBJECTIVE:   /74 (BP Location: Left arm, Patient Position: Left side, Cuff Size: Adult Regular)   Pulse 72   Ht 1.689 m (5' 6.5\")   Wt 71.2 kg (157 lb)   SpO2 97%   BMI 24.96 kg/m   Estimated body mass index is 24.96 kg/m  as calculated from the following:    Height as of this encounter: 1.689 m (5' 6.5\").    Weight as of this encounter: 71.2 kg (157 lb).  Physical Exam  GENERAL APPEARANCE: healthy, alert and no distress  EYES: Eyes grossly normal to inspection, PERRL and conjunctivae and sclerae normal  HENT: ear canals and TM's normal, nose and mouth without ulcers or lesions, oropharynx clear and oral mucous membranes moist  NECK: no adenopathy, no asymmetry, masses, or scars and thyroid normal to palpation  RESP: lungs clear to auscultation - no rales, rhonchi or wheezes  BREAST: normal without masses, " "tenderness or nipple discharge and no palpable axillary masses or adenopathy  CV: regular rate and rhythm, normal S1 S2, no S3 or S4, no murmur, click or rub, no peripheral edema and peripheral pulses strong  ABDOMEN: soft, nontender, no hepatosplenomegaly, no masses and bowel sounds normal  MS: no musculoskeletal defects are noted and gait is age appropriate without ataxia  SKIN: no suspicious lesions or rashes  NEURO: Normal strength and tone, sensory exam grossly normal, mentation intact and speech normal  PSYCH: mentation appears normal and affect normal/bright    Diagnostic Test Results:  Labs reviewed in Epic    ASSESSMENT / PLAN:     Problem List Items Addressed This Visit        Endocrine    Hyperlipidemia    Relevant Orders    Lipid Profile    Prediabetes    Relevant Orders    Hemoglobin A1c       Musculoskeletal and Integumentary    Osteopenia     Patient was prescribed Fosamax 10/2018 and is doing well. Due for Dexa and discussed plan for a 5 year course.          Relevant Orders    Comprehensive metabolic panel    Vitamin D Deficiency    DX Hip/Pelvis/Spine      Other Visit Diagnoses     Encounter for Medicare annual wellness exam    -  Primary    Callus of foot        suggested to see a podiatrist ? need for orthotic    Relevant Orders    Orthopedic  Referral    Postmenopausal status        Relevant Orders    DX Hip/Pelvis/Spine          Patient has been advised of split billing requirements and indicates understanding: Yes  COUNSELING:  Reviewed preventive health counseling, as reflected in patient instructions       Regular exercise       Healthy diet/nutrition       Vision screening       Dental care       Osteoporosis prevention/bone health       Colon cancer screening       Advanced Planning     Estimated body mass index is 24.96 kg/m  as calculated from the following:    Height as of this encounter: 1.689 m (5' 6.5\").    Weight as of this encounter: 71.2 kg (157 lb).        She reports " that she has never smoked. She has never used smokeless tobacco.      Appropriate preventive services were discussed with this patient, including applicable screening as appropriate for cardiovascular disease, diabetes, osteopenia/osteoporosis, and glaucoma.  As appropriate for age/gender, discussed screening for colorectal cancer, prostate cancer, breast cancer, and cervical cancer. Checklist reviewing preventive services available has been given to the patient.    Reviewed patients plan of care and provided an AVS. The Basic Care Plan (routine screening as documented in Health Maintenance) for Kalpana meets the Care Plan requirement. This Care Plan has been established and reviewed with the Patient.    Counseling Resources:  ATP IV Guidelines  Pooled Cohorts Equation Calculator  Breast Cancer Risk Calculator  Breast Cancer: Medication to Reduce Risk  FRAX Risk Assessment  ICSI Preventive Guidelines  Dietary Guidelines for Americans, 2010  USDA's MyPlate  ASA Prophylaxis  Lung CA Screening    TANIKA YU  [facility]    Identified Health Risks:

## 2021-08-30 NOTE — ASSESSMENT & PLAN NOTE
Patient was prescribed Fosamax 10/2018 and is doing well. Due for Dexa and discussed plan for a 5 year course.

## 2021-08-30 NOTE — PATIENT INSTRUCTIONS
Patient Education   Personalized Prevention Plan  You are due for the preventive services outlined below.  Your care team is available to assist you in scheduling these services.  If you have already completed any of these items, please share that information with your care team to update in your medical record.  Health Maintenance Due   Topic Date Due     ANNUAL REVIEW OF HM ORDERS  Never done     Mammogram  07/17/2021     Flu Vaccine (1) 09/01/2021       Understanding USDA MyPlate  The USDA has guidelines to help you make healthy food choices. These are called MyPlate. MyPlate shows the food groups that make up healthy meals using the image of a place setting. Before you eat, think about the healthiest choices for what to put on your plate or in your cup or bowl. To learn more about building a healthy plate, visit www.choosemyplate.gov.    The food groups    Fruits. Any fruit or 100% fruit juice counts as part of the Fruit Group. Fruits may be fresh, canned, frozen, or dried, and may be whole, cut-up, or pureed. Make 1/2 of your plate fruits and vegetables.    Vegetables. Any vegetable or 100% vegetable juice counts as a member of the Vegetable Group. Vegetables may be fresh, frozen, canned, or dried. They can be served raw or cooked and may be whole, cut-up, or mashed. Make 1/2 of your plate fruits and vegetables.    Grains. All foods made from grains are part of the Grains Group. These include wheat, rice, oats, cornmeal, and barley. Grains are often used to make foods such as bread, pasta, oatmeal, cereal, tortillas, and grits. Grains should be no more than 1/4 of your plate. At least half of your grains should be whole grains.    Protein. This group includes meat, poultry, seafood, beans and peas, eggs, processed soy products (such as tofu), nuts (including nut butters), and seeds. Make protein choices no more than 1/4 of your plate. Meat and poultry choices should be lean or low fat.    Dairy. The Dairy  Group includes all fluid milk products and foods made from milk that contain calcium, such as yogurt and cheese. (Foods that have little calcium, such as cream, butter, and cream cheese, are not part of this group.) Most dairy choices should be low-fat or fat-free.    Oils. Oils aren't a food group, but they do contain essential nutrients. However it's important to watch your intake of oils. These are fats that are liquid at room temperature. They include canola, corn, olive, soybean, vegetable, and sunflower oil. Foods that are mainly oil include mayonnaise, certain salad dressings, and soft margarines. You likely already get your daily oil allowance from the foods you eat.  Things to limit  Eating healthy also means limiting these things in your diet:       Salt (sodium). Many processed foods have a lot of sodium. To keep sodium intake down, eat fresh vegetables, meats, poultry, and seafood when possible. Purchase low-sodium, reduced-sodium, or no-salt-added food products at the store. And don't add salt to your meals at home. Instead, season them with herbs and spices such as dill, oregano, cumin, and paprika. Or try adding flavor with lemon or lime zest and juice.    Saturated fat. Saturated fats are most often found in animal products such as beef, pork, and chicken. They are often solid at room temperature, such as butter. To reduce your saturated fat intake, choose leaner cuts of meat and poultry. And try healthier cooking methods such as grilling, broiling, roasting, or baking. For a simple lower-fat swap, use plain nonfat yogurt instead of mayonnaise when making potato salad or macaroni salad.    Added sugars. These are sugars added to foods. They are in foods such as ice cream, candy, soda, fruit drinks, sports drinks, energy drinks, cookies, pastries, jams, and syrups. Cut down on added sugars by sharing sweet treats with a family member or friend. You can also choose fruit for dessert, and drink water or  other unsweetened beverages.     [employer] last reviewed this educational content on 6/1/2020 2000-2021 [employer]. All rights reserved. This information is not intended as a substitute for professional medical care. Always follow your healthcare professional's instructions.          Urinary Incontinence, Female (Adult)   Urinary incontinence means loss of bladder control. This problem affects many women, especially as they get older. If you have incontinence, you may be embarrassed to ask for help. But know that this problem can be treated.   Types of Incontinence  There are different types of incontinence. Two of the main types are described here. You can have more than one type.     Stress incontinence. With this type, urine leaks when pressure (stress) is put on the bladder. This may happen when you cough, sneeze, or laugh. Stress incontinence most often occurs because the pelvic floor muscles that support the bladder and urethra are weak. This can happen after pregnancy and vaginal childbirth or a hysterectomy. It can also be due to excess body weight or hormone changes.    Urge incontinence (also called overactive bladder). With this type, a sudden urge to urinate is felt often. This may happen even though there may not be much urine in the bladder. The need to urinate often during the night is common. Urge incontinence most often occurs because of bladder spasms. This may be due to bladder irritation or infection. Damage to bladder nerves or pelvic muscles, constipation, and certain medicines can also lead to urge incontinence.  Treatment depends on the cause. Further evaluation is needed to find the type you have. This will likely include an exam and certain tests. Based on the results, you and your healthcare provider can then plan treatment. Until a diagnosis is made, the home care tips below can help ease symptoms.   Home care    Do pelvic floor muscle exercises, if they are  prescribed. The pelvic floor muscles help support the bladder and urethra. Many women find that their symptoms improve when doing special exercises that strengthen these muscles. To do the exercises, contract the muscles you would use to stop your stream of urine. But do this when you re not urinating. Hold for 10 seconds, then relax. Repeat 10 to 20 times in a row, at least 3 times a day. Your healthcare provider may give you other instructions for how to do the exercises and how often.    Keep a bladder diary. This helps track how often and how much you urinate over a set period of time. Bring this diary with you to your next visit with the provider. The information can help your provider learn more about your bladder problem.    Lose weight, if advised to by your provider. Extra weight puts pressure on the bladder. Your provider can help you create a weight-loss plan that s right for you. This may include exercising more and making certain diet changes.    Don't have foods and drinks that may irritate the bladder. These can include alcohol and caffeinated drinks.    Quit smoking. Smoking and other tobacco use can lead to a long-term (chronic) cough that strains the pelvic floor muscles. Smoking may also damage the bladder and urethra. Talk with your provider about treatments or methods you can use to quit smoking.    If drinking large amounts of fluid makes you have symptoms, you may be advised to limit your fluid intake. You may also be advised to drink most of your fluids during the day and to limit fluids at night.    If you re worried about urine leakage or accidents, you may wear absorbent pads to catch urine. Change the pads often. This helps reduce discomfort. It may also reduce the risk of skin or bladder infections.    Follow-up care  Follow up with your healthcare provider, or as directed. It may take some to find the right treatment for your problem. But healthy lifestyle changes can be made right  away. These include such things as exercising on a regular basis, eating a healthy diet, losing weight (if needed), and quitting smoking. Your treatment plan may include special therapies or medicines. Certain procedures or surgery may also be options. Talk about any questions you have with your provider.   When to seek medical advice  Call the healthcare provider right away if any of these occur:    Fever of 100.4 F (38 C) or higher, or as directed by your provider    Bladder pain or fullness    Belly swelling    Nausea or vomiting    Back pain    Weakness, dizziness, or fainting  [employer] last reviewed this educational content on 1/1/2020 2000-2021 [employer]. All rights reserved. This information is not intended as a substitute for professional medical care. Always follow your healthcare professional's instructions.

## 2021-08-30 NOTE — PROGRESS NOTES
"    The patient was counseled and encouraged to consider modifying their diet and eating habits. She was provided with information on recommended healthy diet options.  Information on urinary incontinence and treatment options given to patient.  Answers for HPI/ROS submitted by the patient on 8/30/2021  In general, how would you rate your overall physical health?: good  Frequency of exercise:: 4-5 days/week  Do you usually eat at least 4 servings of fruit and vegetables a day, include whole grains & fiber, and avoid regularly eating high fat or \"junk\" foods? : No  Medication side effects:: None  Activities of Daily Living: no assistance needed  Home safety: no safety concerns identified  Hearing Impairment:: no hearing concerns  In the past 6 months, have you been bothered by leaking of urine?: Yes  In general, how would you rate your overall mental or emotional health?: good  Additional concerns today:: Yes  Duration of exercise:: 15-30 minutes      "

## 2021-08-31 LAB — DEPRECATED CALCIDIOL+CALCIFEROL SERPL-MC: 51 UG/L (ref 30–80)

## 2021-09-08 ENCOUNTER — OFFICE VISIT (OUTPATIENT)
Dept: PODIATRY | Facility: CLINIC | Age: 81
End: 2021-09-08
Attending: FAMILY MEDICINE
Payer: COMMERCIAL

## 2021-09-08 VITALS — BODY MASS INDEX: 24.91 KG/M2 | WEIGHT: 155 LBS | HEART RATE: 93 BPM | HEIGHT: 66 IN | OXYGEN SATURATION: 98 %

## 2021-09-08 DIAGNOSIS — L57.0 KERATOMA: Primary | ICD-10-CM

## 2021-09-08 DIAGNOSIS — M21.6X1 PLANTAR FLEXED METATARSAL BONE OF RIGHT FOOT: ICD-10-CM

## 2021-09-08 PROCEDURE — 11055 PARING/CUTG B9 HYPRKER LES 1: CPT | Performed by: PODIATRIST

## 2021-09-08 PROCEDURE — 99203 OFFICE O/P NEW LOW 30 MIN: CPT | Mod: 25 | Performed by: PODIATRIST

## 2021-09-08 ASSESSMENT — MIFFLIN-ST. JEOR: SCORE: 1184.83

## 2021-09-08 ASSESSMENT — PAIN SCALES - GENERAL: PAINLEVEL: MILD PAIN (2)

## 2021-09-08 NOTE — PROGRESS NOTES
FOOT AND ANKLE SURGERY/PODIATRY CONSULT NOTE         ASSESSMENT:   Keratoma right foot  Plantarflexed fifth metatarsal right foot      TREATMENT:  Debrided the hyperkeratotic lesion right foot today.  I have recommended a pair of orthotics in an attempt to prevent rapid recurrence of this painful keratoma.  The patient is to return to the clinic as needed.        HPI: I was asked to see Kalpana Manzo today complaining of a painful callus on the bottom of her right foot.  The patient indicated that she has had this painful callus for quite some time.  She has had it treated regularly by a podiatrist in [place].  She was accompanied by her  today and she mentioned that periodically he shaves down the painful callus.  She stated that the callus is not painful all the time.  She denies any trauma to her foot.  She has not had any associated drainage or bleeding.  The pain is easily relieved with nonweightbearing.  The patient was seen in consultation at the request of Mela López for evaluation and treatment of right foot pain.     Past Medical History:   Diagnosis Date     Basal cell carcinoma of skin     Created by Conversion  Replacement Utility updated for latest IMO load     Contact dermatitis and other eczema, due to unspecified cause     Created by Conversion      Dermatophytosis of nail     Created by Conversion      Disorder of bone and cartilage     Created by Conversion  Replacement Utility updated for latest IMO load     Hepatitis     Created by Conversion [facility] Annotation: Aug 22 2011  9:09AM - Mela López: in the 1980's  unspecified  Replacement Utility updated for latest IMO load     Hyperlipidemia     Created by Conversion      Nocturia     Created by Conversion      Osteoarthrosis involving lower leg     Created by Conversion  Replacement Utility updated for latest IMO load     Prediabetes 7/18/2016     Swelling of limb     Created by Conversion        Social History      Socioeconomic History     Marital status:      Spouse name: Not on file     Number of children: Not on file     Years of education: Not on file     Highest education level: Not on file   Occupational History     Not on file   Tobacco Use     Smoking status: Never Smoker     Smokeless tobacco: Never Used   Substance and Sexual Activity     Alcohol use: Yes     Comment: Alcoholic Drinks/day: rare     Drug use: No     Sexual activity: Not on file   Other Topics Concern     Not on file   Social History Narrative    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      Social Determinants of Health     Financial Resource Strain:      Difficulty of Paying Living Expenses:    Food Insecurity:      Worried About Running Out of Food in the Last Year:      Ran Out of Food in the Last Year:    Transportation Needs:      Lack of Transportation (Medical):      Lack of Transportation (Non-Medical):    Physical Activity:      Days of Exercise per Week:      Minutes of Exercise per Session:    Stress:      Feeling of Stress :    Social Connections:      Frequency of Communication with Friends and Family:      Frequency of Social Gatherings with Friends and Family:      Attends Mandaeism Services:      Active Member of Clubs or Organizations:      Attends Club or Organization Meetings:      Marital Status:    Intimate Partner Violence:      Fear of Current or Ex-Partner:      Emotionally Abused:      Physically Abused:      Sexually Abused:           Allergies   Allergen Reactions     Bactrim [Sulfamethoxazole W/Trimethoprim] Dizziness          Current Outpatient Medications:      alendronate (FOSAMAX) 70 MG tablet, [ALENDRONATE (FOSAMAX) 70 MG TABLET] Take 1 tablet (70 mg total) by mouth every 7 days., Disp: 12 tablet, Rfl: 3     cholecalciferol, vitamin D3, (VITAMIN D3) 2,000 unit Tab, [CHOLECALCIFEROL, VITAMIN D3, (VITAMIN D3) 2,000 UNIT TAB] 2,000 Units daily.       , Disp: , Rfl:      GLUCOSAM HCL/CHONDRO MONTES A/C/MN  (GLUCOSAMINE-CHONDROITIN COMPLX ORAL), [GLUCOSAM HCL/CHONDRO MONTES A/C/MN (GLUCOSAMINE-CHONDROITIN COMPLX ORAL)] Take 1 tablet by mouth daily. Tablet, Disp: , Rfl:      multivitamin capsule, [MULTIVITAMIN CAPSULE] Take 1 capsule by mouth daily., Disp: , Rfl:      omega-3 fatty acids (FISH OIL CONCENTRATE) 1,000 mg cap, [OMEGA-3 FATTY ACIDS (FISH OIL CONCENTRATE) 1,000 MG CAP] daily.       , Disp: , Rfl:      simvastatin (ZOCOR) 10 MG tablet, [SIMVASTATIN (ZOCOR) 10 MG TABLET] Take 1 tablet (10 mg total) by mouth daily., Disp: 90 tablet, Rfl: 3     Family History   Problem Relation Age of Onset     Varicose Veins Mother         Social History     Socioeconomic History     Marital status:      Spouse name: Not on file     Number of children: Not on file     Years of education: Not on file     Highest education level: Not on file   Occupational History     Not on file   Tobacco Use     Smoking status: Never Smoker     Smokeless tobacco: Never Used   Substance and Sexual Activity     Alcohol use: Yes     Comment: Alcoholic Drinks/day: rare     Drug use: No     Sexual activity: Not on file   Other Topics Concern     Not on file   Social History Narrative    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      Social Determinants of Health     Financial Resource Strain:      Difficulty of Paying Living Expenses:    Food Insecurity:      Worried About Running Out of Food in the Last Year:      Ran Out of Food in the Last Year:    Transportation Needs:      Lack of Transportation (Medical):      Lack of Transportation (Non-Medical):    Physical Activity:      Days of Exercise per Week:      Minutes of Exercise per Session:    Stress:      Feeling of Stress :    Social Connections:      Frequency of Communication with Friends and Family:      Frequency of Social Gatherings with Friends and Family:      Attends Yazdanism Services:      Active Member of Clubs or Organizations:      Attends Club or Organization Meetings:      Marital Status:     Intimate Partner Violence:      Fear of Current or Ex-Partner:      Emotionally Abused:      Physically Abused:      Sexually Abused:         Review of Systems - Patient denies fever, chills, rash, wound, stiffness, limping, numbness, weakness, heart burn, blood in stool, chest pain with activity, calf pain when walking, shortness of breath with activity, chronic cough, easy bleeding/bruising, swelling of ankles, excessive thirst, fatigue, depression, anxiety.  Patient admits to right foot pain.      OBJECTIVE:  Appearance: alert, well appearing, and in no distress.    There were no vitals taken for this visit.     There is no height or weight on file to calculate BMI.     General appearance: Patient is alert and fully cooperative with history & exam.  No sign of distress is noted during the visit.  Psychiatric: Affect is pleasant & appropriate.  Patient appears motivated to improve health.  Respiratory: Breathing is regular & unlabored while sitting.  HEENT: Hearing is intact to spoken word.  Speech is clear.  No gross evidence of visual impairment that would impact ambulation.    Vascular: Dorsalis pedis and posterior tibial pulses are palpable. There is no pedal hair growth bilaterally.  CFT < 3 sec from anterior tibial surface to distal digits bilaterally. There is no appreciable edema noted.  Dermatologic: Small round hyperkeratotic nucleated lesion subfifth metatarsal head right foot.  Turgor and texture are within normal limits. No coloration or temperature changes. No other primary or secondary lesions noted.  Neurologic: All epicritic and proprioceptive sensations are grossly intact bilaterally.  Musculoskeletal: All active and passive ankle, subtalar, midtarsal, and 1st MPJ range of motion are grossly intact without pain or crepitus, with the exception of none. Manual muscle strength is within normal limits bilaterally. All dorsiflexors, plantarflexors, invertors, evertors are intact bilaterally.  Tenderness present to the subfifth metatarsal head right foot on palpation.  No tenderness to the right foot or ankle with range of motion. Calf is soft/non-tender without warmth/induration    Imaging:       No images are attached to the encounter or orders placed in the encounter.     No results found.   No results found.       Saji Suarez DPM  [facility] Foot & Ankle Surgery/Podiatry

## 2021-09-08 NOTE — LETTER
9/8/2021         RE: Kalpana Manzo  [address] [facility] [address]        Dear Colleague,    Thank you for referring your patient, Kalpana Manzo, to the [facility]. Please see a copy of my visit note below.    FOOT AND ANKLE SURGERY/PODIATRY CONSULT NOTE         ASSESSMENT:   Keratoma right foot  Plantarflexed fifth metatarsal right foot      TREATMENT:  Debrided the hyperkeratotic lesion right foot today.  I have recommended a pair of orthotics in an attempt to prevent rapid recurrence of this painful keratoma.  The patient is to return to the clinic as needed.        HPI: I was asked to see Kalpana Manzo today complaining of a painful callus on the bottom of her right foot.  The patient indicated that she has had this painful callus for quite some time.  She has had it treated regularly by a podiatrist in [place].  She was accompanied by her  today and she mentioned that periodically he shaves down the painful callus.  She stated that the callus is not painful all the time.  She denies any trauma to her foot.  She has not had any associated drainage or bleeding.  The pain is easily relieved with nonweightbearing.  The patient was seen in consultation at the request of Mela López for evaluation and treatment of right foot pain.     Past Medical History:   Diagnosis Date     Basal cell carcinoma of skin     Created by Conversion  Replacement Utility updated for latest IMO load     Contact dermatitis and other eczema, due to unspecified cause     Created by Conversion      Dermatophytosis of nail     Created by Conversion      Disorder of bone and cartilage     Created by Conversion  Replacement Utility updated for latest IMO load     Hepatitis     Created by Conversion [facility] Annotation: Aug 22 2011  9:09AM - Mela López: in the 1980's  unspecified  Replacement Utility updated for latest IMO load     Hyperlipidemia     Created by Conversion       Nocturia     Created by Conversion      Osteoarthrosis involving lower leg     Created by Conversion  Replacement Utility updated for latest IMO load     Prediabetes 7/18/2016     Swelling of limb     Created by Conversion        Social History     Socioeconomic History     Marital status:      Spouse name: Not on file     Number of children: Not on file     Years of education: Not on file     Highest education level: Not on file   Occupational History     Not on file   Tobacco Use     Smoking status: Never Smoker     Smokeless tobacco: Never Used   Substance and Sexual Activity     Alcohol use: Yes     Comment: Alcoholic Drinks/day: rare     Drug use: No     Sexual activity: Not on file   Other Topics Concern     Not on file   Social History Narrative    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      Social Determinants of Health     Financial Resource Strain:      Difficulty of Paying Living Expenses:    Food Insecurity:      Worried About Running Out of Food in the Last Year:      Ran Out of Food in the Last Year:    Transportation Needs:      Lack of Transportation (Medical):      Lack of Transportation (Non-Medical):    Physical Activity:      Days of Exercise per Week:      Minutes of Exercise per Session:    Stress:      Feeling of Stress :    Social Connections:      Frequency of Communication with Friends and Family:      Frequency of Social Gatherings with Friends and Family:      Attends Christianity Services:      Active Member of Clubs or Organizations:      Attends Club or Organization Meetings:      Marital Status:    Intimate Partner Violence:      Fear of Current or Ex-Partner:      Emotionally Abused:      Physically Abused:      Sexually Abused:           Allergies   Allergen Reactions     Bactrim [Sulfamethoxazole W/Trimethoprim] Dizziness          Current Outpatient Medications:      alendronate (FOSAMAX) 70 MG tablet, [ALENDRONATE (FOSAMAX) 70 MG TABLET] Take 1 tablet (70 mg total) by mouth every 7 days.,  Disp: 12 tablet, Rfl: 3     cholecalciferol, vitamin D3, (VITAMIN D3) 2,000 unit Tab, [CHOLECALCIFEROL, VITAMIN D3, (VITAMIN D3) 2,000 UNIT TAB] 2,000 Units daily.       , Disp: , Rfl:      GLUCOSAM HCL/CHONDRO MONTES A/C/MN (GLUCOSAMINE-CHONDROITIN COMPLX ORAL), [GLUCOSAM HCL/CHONDRO MONTES A/C/MN (GLUCOSAMINE-CHONDROITIN COMPLX ORAL)] Take 1 tablet by mouth daily. Tablet, Disp: , Rfl:      multivitamin capsule, [MULTIVITAMIN CAPSULE] Take 1 capsule by mouth daily., Disp: , Rfl:      omega-3 fatty acids (FISH OIL CONCENTRATE) 1,000 mg cap, [OMEGA-3 FATTY ACIDS (FISH OIL CONCENTRATE) 1,000 MG CAP] daily.       , Disp: , Rfl:      simvastatin (ZOCOR) 10 MG tablet, [SIMVASTATIN (ZOCOR) 10 MG TABLET] Take 1 tablet (10 mg total) by mouth daily., Disp: 90 tablet, Rfl: 3     Family History   Problem Relation Age of Onset     Varicose Veins Mother         Social History     Socioeconomic History     Marital status:      Spouse name: Not on file     Number of children: Not on file     Years of education: Not on file     Highest education level: Not on file   Occupational History     Not on file   Tobacco Use     Smoking status: Never Smoker     Smokeless tobacco: Never Used   Substance and Sexual Activity     Alcohol use: Yes     Comment: Alcoholic Drinks/day: rare     Drug use: No     Sexual activity: Not on file   Other Topics Concern     Not on file   Social History Narrative    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      Social Determinants of Health     Financial Resource Strain:      Difficulty of Paying Living Expenses:    Food Insecurity:      Worried About Running Out of Food in the Last Year:      Ran Out of Food in the Last Year:    Transportation Needs:      Lack of Transportation (Medical):      Lack of Transportation (Non-Medical):    Physical Activity:      Days of Exercise per Week:      Minutes of Exercise per Session:    Stress:      Feeling of Stress :    Social Connections:      Frequency of Communication with  Friends and Family:      Frequency of Social Gatherings with Friends and Family:      Attends Zoroastrianism Services:      Active Member of Clubs or Organizations:      Attends Club or Organization Meetings:      Marital Status:    Intimate Partner Violence:      Fear of Current or Ex-Partner:      Emotionally Abused:      Physically Abused:      Sexually Abused:         Review of Systems - Patient denies fever, chills, rash, wound, stiffness, limping, numbness, weakness, heart burn, blood in stool, chest pain with activity, calf pain when walking, shortness of breath with activity, chronic cough, easy bleeding/bruising, swelling of ankles, excessive thirst, fatigue, depression, anxiety.  Patient admits to right foot pain.      OBJECTIVE:  Appearance: alert, well appearing, and in no distress.    There were no vitals taken for this visit.     There is no height or weight on file to calculate BMI.     General appearance: Patient is alert and fully cooperative with history & exam.  No sign of distress is noted during the visit.  Psychiatric: Affect is pleasant & appropriate.  Patient appears motivated to improve health.  Respiratory: Breathing is regular & unlabored while sitting.  HEENT: Hearing is intact to spoken word.  Speech is clear.  No gross evidence of visual impairment that would impact ambulation.    Vascular: Dorsalis pedis and posterior tibial pulses are palpable. There is no pedal hair growth bilaterally.  CFT < 3 sec from anterior tibial surface to distal digits bilaterally. There is no appreciable edema noted.  Dermatologic: Small round hyperkeratotic nucleated lesion subfifth metatarsal head right foot.  Turgor and texture are within normal limits. No coloration or temperature changes. No other primary or secondary lesions noted.  Neurologic: All epicritic and proprioceptive sensations are grossly intact bilaterally.  Musculoskeletal: All active and passive ankle, subtalar, midtarsal, and 1st MPJ range of  motion are grossly intact without pain or crepitus, with the exception of none. Manual muscle strength is within normal limits bilaterally. All dorsiflexors, plantarflexors, invertors, evertors are intact bilaterally. Tenderness present to the subfifth metatarsal head right foot on palpation.  No tenderness to the right foot or ankle with range of motion. Calf is soft/non-tender without warmth/induration    Imaging:       No images are attached to the encounter or orders placed in the encounter.     No results found.   No results found.       Saji Suarez DPM  [facility] Foot & Ankle Surgery/Podiatry         Again, thank you for allowing me to participate in the care of your patient.        Sincerely,        Saji Morocho DPM

## 2021-09-08 NOTE — PATIENT INSTRUCTIONS
What are Prescription Custom Orthotics?  Custom orthotics are specially-made devices designed to support and comfort your feet. Prescription orthotics are crafted for you and no one else. They match the contours of your feet precisely and are designed for the way you move. Orthotics are only manufactured after a podiatrist has conducted a complete evaluation of your feet, ankles, and legs, so the orthotic can accommodate your unique foot structure and pathology.  Prescription orthotics are divided into two categories:    Functional orthotics are designed to control abnormal motion. They may be used to treat foot pain caused by abnormal motion; they can also be used to treat injuries such as shin splints or tendinitis. Functional orthotics are usually crafted of a semi-rigid material such as plastic or graphite.    Accommodative orthotics are softer and meant to provide additional cushioning and support. They can be used to treat diabetic foot ulcers, painful calluses on the bottom of the foot, and other uncomfortable conditions.  Podiatrists use orthotics to treat foot problems such as plantar fasciitis, bursitis, tendinitis, diabetic foot ulcers, and foot, ankle, and heel pain. Clinical research studies have shown that podiatrist-prescribed foot orthotics decrease foot pain and improve function.  Orthotics typically cost more than shoe inserts purchased in a retail store, but the additional cost is usually well worth it. Unlike shoe inserts, orthotics are molded to fit each individual foot, so you can be sure that your orthotics fit and do what they're supposed to do. Prescription orthotics are also made of top-notch materials and last many years when cared for properly. Insurance often helps pay for prescription orthotics.  What are Shoe Inserts?   You've seen them at the grocery store and at the mall. You've probably even seen them on TV and online. Shoe inserts are any kind of non-prescription foot support  designed to be worn inside a shoe. Pre-packaged, mass produced, arch supports are shoe inserts. So are the  custom-made  insoles and foot supports that you can order online or at retail stores. Unless the device has been prescribed by a doctor and crafted for your specific foot, it's a shoe insert, not a custom orthotic device--despite what the ads might say.  Shoe inserts can be very helpful for a variety of foot ailments, including flat arches and foot and leg pain. They can cushion your feet, provide comfort, and support your arches, but they can't correct biomechanical foot problems or cure long-standing foot issues.  The most common types of shoe inserts are:    Arch supports: Some people have high arches. Others have low arches or flat feet. Arch supports generally have a  bumped-up  appearance and are designed to support the foot's natural arch.     Insoles: Insoles slip into your shoe to provide extra cushioning and support. Insoles are often made of gel, foam, or plastic.     Heel liners: Heel liners, sometimes called heel pads or heel cups, provide extra cushioning in the heel region. They may be especially useful for patients who have foot pain caused by age-related thinning of the heels' natural fat pads.     Foot cushions: Do your shoes rub against your heel or your toes? Foot cushions come in many different shapes and sizes and can be used as a barrier between you and your shoe.  Choosing an Over-the-Counter Shoe Insert  Selecting a shoe insert from the wide variety of devices on the market can be overwhelming. Here are some podiatrist-tested tips to help you find the insert that best meets your needs:    Consider your health. Do you have diabetes? Problems with circulation? An over-the-counter insert may not be your best bet. Diabetes and poor circulation increase your risk of foot ulcers and infections, so schedule an appointment with a podiatrist. He or she can help you select a solution that won't  cause additional health problems.     Think about the purpose. Are you planning to run a marathon, or do you just need a little arch support in your work shoes? Look for a product that fits your planned level of activity.     Bring your shoes. For the insert to be effective, it has to fit into your shoes. So bring your sneakers, dress shoes, or work boots--whatever you plan to wear with your insert. Look for an insert that will fit the contours of your shoe.     Try them on. If all possible, slip the insert into your shoe and try it out. Walk around a little. How does it feel? Don't assume that feelings of pressure will go away with continued wear. (If you can't try the inserts at the store, ask about the store's return policy and hold on to your receipt.)    Please call one of the [facility] locations below to schedule an appointment. If you received a prescription please bring it with you to your appointment. Some locations are limited to what they carry.    Office Locations    formerly [facility]  [address] [place] [address]  Home Medical Equipment, [address]   Phone: [phone]   Orthotics and Prosthetics, [address]   Phone: [phone]    [facility]  Home Medical Equipment  [address], [address], [place] [address]   Phone: [phone]    Orthotics and Prosthetics ([facility])    [address], [address], [place] [address]  Phone: [phone]    [facility]  [address]   [place] [address]   Phone: [phone]    [facility] Professional Bldg.  [address]  [place] [address]  Phone: [phone]    [facility] Bldg.   [address]  [place] [address]  Phone: [phone]    [facility]  [address]  [place] [address]  Phone: [phone]    [facility]  [address]  [place] [address]  Phone: [phone]    [address] [place] [address]   Phone: [phone]

## 2021-09-14 ENCOUNTER — LAB (OUTPATIENT)
Dept: LAB | Facility: CLINIC | Age: 81
End: 2021-09-14
Payer: COMMERCIAL

## 2021-09-14 DIAGNOSIS — E87.0 HYPERNATREMIA: ICD-10-CM

## 2021-09-14 DIAGNOSIS — E87.0 HYPERNATREMIA: Primary | ICD-10-CM

## 2021-09-14 LAB
ANION GAP SERPL CALCULATED.3IONS-SCNC: 8 MMOL/L (ref 5–18)
BUN SERPL-MCNC: 26 MG/DL (ref 8–28)
CALCIUM SERPL-MCNC: 9 MG/DL (ref 8.5–10.5)
CHLORIDE BLD-SCNC: 109 MMOL/L (ref 98–107)
CO2 SERPL-SCNC: 27 MMOL/L (ref 22–31)
CREAT SERPL-MCNC: 0.89 MG/DL (ref 0.6–1.1)
GFR SERPL CREATININE-BSD FRML MDRD: 61 ML/MIN/1.73M2
GLUCOSE BLD-MCNC: 95 MG/DL (ref 70–125)
POTASSIUM BLD-SCNC: 4.2 MMOL/L (ref 3.5–5)
SODIUM SERPL-SCNC: 144 MMOL/L (ref 136–145)

## 2021-09-14 PROCEDURE — 36415 COLL VENOUS BLD VENIPUNCTURE: CPT

## 2021-09-14 PROCEDURE — 80048 BASIC METABOLIC PNL TOTAL CA: CPT

## 2021-09-17 ENCOUNTER — ANCILLARY PROCEDURE (OUTPATIENT)
Dept: BONE DENSITY | Facility: CLINIC | Age: 81
End: 2021-09-17
Attending: FAMILY MEDICINE
Payer: COMMERCIAL

## 2021-09-17 DIAGNOSIS — M94.9 DISORDER OF BONE AND CARTILAGE: ICD-10-CM

## 2021-09-17 DIAGNOSIS — M89.9 DISORDER OF BONE AND CARTILAGE: ICD-10-CM

## 2021-09-17 DIAGNOSIS — Z78.0 POSTMENOPAUSAL STATUS: ICD-10-CM

## 2021-09-17 PROCEDURE — 77080 DXA BONE DENSITY AXIAL: CPT | Mod: TC | Performed by: RADIOLOGY

## 2021-10-17 ENCOUNTER — HEALTH MAINTENANCE LETTER (OUTPATIENT)
Age: 81
End: 2021-10-17

## 2021-11-11 ENCOUNTER — DOCUMENTATION ONLY (OUTPATIENT)
Dept: OTHER | Facility: CLINIC | Age: 81
End: 2021-11-11
Payer: COMMERCIAL

## 2022-03-18 ENCOUNTER — TRANSFERRED RECORDS (OUTPATIENT)
Dept: HEALTH INFORMATION MANAGEMENT | Facility: CLINIC | Age: 82
End: 2022-03-18
Payer: COMMERCIAL

## 2022-03-18 LAB
ALT SERPL-CCNC: 12 IU/L (ref 5–46)
AST SERPL-CCNC: 19 IU/L (ref 11–40)
CHOLESTEROL (EXTERNAL): 176 MG/DL
CREATININE (EXTERNAL): 1.01 MG/DL (ref 0.6–1.4)
GFR ESTIMATED (EXTERNAL): 56 ML/MIN/1.73M2
GLUCOSE (EXTERNAL): 95 MG/DL (ref 70–99)
HBA1C MFR BLD: 6 %
HDLC SERPL-MCNC: 58 MG/DL
LDL CHOLESTEROL CALCULATED (EXTERNAL): 97 MG/DL
NON HDL CHOLESTEROL (EXTERNAL): 118 MG/DL
POTASSIUM (EXTERNAL): 4 MMOL/L (ref 3.6–5.3)
TRIGLYCERIDES (EXTERNAL): 96 MG/DL
TSH SERPL-ACNC: 4.06 MU/L (ref 0.45–4.5)

## 2022-05-02 ENCOUNTER — OFFICE VISIT (OUTPATIENT)
Dept: FAMILY MEDICINE | Facility: CLINIC | Age: 82
End: 2022-05-02
Payer: COMMERCIAL

## 2022-05-02 VITALS
BODY MASS INDEX: 25.18 KG/M2 | OXYGEN SATURATION: 97 % | DIASTOLIC BLOOD PRESSURE: 78 MMHG | WEIGHT: 156 LBS | SYSTOLIC BLOOD PRESSURE: 132 MMHG | HEART RATE: 88 BPM

## 2022-05-02 DIAGNOSIS — N32.81 OVERACTIVE BLADDER: Primary | ICD-10-CM

## 2022-05-02 DIAGNOSIS — M94.9 DISORDER OF BONE AND CARTILAGE: ICD-10-CM

## 2022-05-02 DIAGNOSIS — M89.9 DISORDER OF BONE AND CARTILAGE: ICD-10-CM

## 2022-05-02 DIAGNOSIS — R49.0 HOARSENESS: ICD-10-CM

## 2022-05-02 DIAGNOSIS — R73.03 PREDIABETES: ICD-10-CM

## 2022-05-02 DIAGNOSIS — E78.5 HYPERLIPIDEMIA, UNSPECIFIED HYPERLIPIDEMIA TYPE: ICD-10-CM

## 2022-05-02 PROCEDURE — 99214 OFFICE O/P EST MOD 30 MIN: CPT | Performed by: FAMILY MEDICINE

## 2022-05-02 RX ORDER — SIMVASTATIN 10 MG
10 TABLET ORAL DAILY
Qty: 90 TABLET | Refills: 3 | Status: SHIPPED | OUTPATIENT
Start: 2022-05-02 | End: 2023-04-27

## 2022-05-02 RX ORDER — TOLTERODINE 4 MG/1
4 CAPSULE, EXTENDED RELEASE ORAL DAILY
Qty: 30 CAPSULE | Refills: 1 | Status: SHIPPED | OUTPATIENT
Start: 2022-05-02 | End: 2022-08-31

## 2022-05-02 RX ORDER — ALENDRONATE SODIUM 70 MG/1
70 TABLET ORAL
Qty: 12 TABLET | Refills: 3 | Status: SHIPPED | OUTPATIENT
Start: 2022-05-02 | End: 2023-04-07

## 2022-05-02 NOTE — ASSESSMENT & PLAN NOTE
This was evaluated by an ENT down in [place] and she had direct laryngoscopy and was told that she might have asymptomatic reflux contributing.  She was recommended to take Pepcid but has not started it yet as she wanted to get my opinion.  I recommended going ahead with that or omeprazole once daily.

## 2022-05-02 NOTE — ASSESSMENT & PLAN NOTE
The patient's A1c is up slightly from the past couple of years at 6.0.  Discussed the importance of regular cardiovascular exercise as well as a lower carbohydrate diet.

## 2022-05-02 NOTE — ASSESSMENT & PLAN NOTE
The patient continues on alendronate 70 mg weekly and is up-to-date on her bone density testing.  She supplements vitamin D on a daily basis.

## 2022-05-02 NOTE — PROGRESS NOTES
Problem List Items Addressed This Visit        Endocrine    Hyperlipidemia     Continues on simvastatin 10 mg daily and recent lipid panel looked good.           Relevant Medications    simvastatin (ZOCOR) 10 MG tablet    Prediabetes     The patient's A1c is up slightly from the past couple of years at 6.0.  Discussed the importance of regular cardiovascular exercise as well as a lower carbohydrate diet.              Musculoskeletal and Integumentary    Osteopenia     The patient continues on alendronate 70 mg weekly and is up-to-date on her bone density testing.  She supplements vitamin D on a daily basis.           Relevant Medications    alendronate (FOSAMAX) 70 MG tablet       Urinary    Overactive bladder - Primary     We have discussed overactive bladder symptoms previously and the patient at this point would like to give a trial to her medication.  Detrol LA 4 mg daily was prescribed.           Relevant Medications    tolterodine ER (DETROL LA) 4 MG 24 hr capsule       Other    Hoarseness     This was evaluated by an ENT down in [place] and she had direct laryngoscopy and was told that she might have asymptomatic reflux contributing.  She was recommended to take Pepcid but has not started it yet as she wanted to get my opinion.  I recommended going ahead with that or omeprazole once daily.                 TANIKA YU    Rowena   Kalpana is a 82 year old who presents today accompanied by her  for a routine medication check visit.  The patient and her  recently returned from [place] where they spent the winter.  Overall patient is doing well.  She did have her vocal issues further evaluated by an ENT down there and has questions regarding the use of Pepcid.  She does not notice any heartburn so wonders if it is a good idea to start or not.  The patient otherwise feels well and she does bring in some labs that were performed down in [place] for me to review.  The patient also wants to  discuss once again trialing a medication to help with overactive bladder.  She is up multiple times a night averaging around 3 or 4 due to urgency of urination.        Objective    /78 (BP Location: Right arm, Patient Position: Right side, Cuff Size: Adult Regular)   Pulse 88   Wt 70.8 kg (156 lb)   SpO2 97%   BMI 25.18 kg/m    Body mass index is 25.18 kg/m .  Physical Exam   GENERAL: healthy, alert and no distress  RESP: lungs clear to auscultation - no rales, rhonchi or wheezes  CV: regular rate and rhythm, normal S1 S2, no S3 or S4, no murmur, click or rub, no peripheral edema and peripheral pulses strong            Answers for HPI/ROS submitted by the patient on 5/2/2022  How many servings of fruits and vegetables do you eat daily?: 2-3  On average, how many sweetened beverages do you drink each day (Examples: soda, juice, sweet tea, etc.  Do NOT count diet or artificially sweetened beverages)?: 1  How many minutes a day do you exercise enough to make your heart beat faster?: 10 to 19  How many days a week do you exercise enough to make your heart beat faster?: 4  How many days per week do you miss taking your medication?: 0  What is the reason for your visit today?: Many reasons.    Foot. Frequent bathroom visits

## 2022-05-02 NOTE — ASSESSMENT & PLAN NOTE
We have discussed overactive bladder symptoms previously and the patient at this point would like to give a trial to her medication.  Detrol LA 4 mg daily was prescribed.

## 2022-05-17 ENCOUNTER — TRANSFERRED RECORDS (OUTPATIENT)
Dept: HEALTH INFORMATION MANAGEMENT | Facility: CLINIC | Age: 82
End: 2022-05-17
Payer: COMMERCIAL

## 2022-05-24 DIAGNOSIS — N32.81 OVERACTIVE BLADDER: ICD-10-CM

## 2022-05-25 RX ORDER — TOLTERODINE 4 MG/1
CAPSULE, EXTENDED RELEASE ORAL
Qty: 30 CAPSULE | Refills: 1 | OUTPATIENT
Start: 2022-05-25

## 2022-06-08 ENCOUNTER — NURSE TRIAGE (OUTPATIENT)
Dept: NURSING | Facility: CLINIC | Age: 82
End: 2022-06-08

## 2022-06-08 ENCOUNTER — HOSPITAL ENCOUNTER (EMERGENCY)
Facility: CLINIC | Age: 82
Discharge: HOME | End: 2022-06-08
Attending: FAMILY MEDICINE | Admitting: FAMILY MEDICINE
Payer: COMMERCIAL

## 2022-06-08 ENCOUNTER — APPOINTMENT (OUTPATIENT)
Dept: RADIOLOGY | Facility: CLINIC | Age: 82
End: 2022-06-08
Attending: FAMILY MEDICINE
Payer: COMMERCIAL

## 2022-06-08 VITALS
HEART RATE: 63 BPM | BODY MASS INDEX: 24.21 KG/M2 | WEIGHT: 150 LBS | DIASTOLIC BLOOD PRESSURE: 74 MMHG | RESPIRATION RATE: 17 BRPM | SYSTOLIC BLOOD PRESSURE: 159 MMHG | TEMPERATURE: 97.5 F | OXYGEN SATURATION: 97 %

## 2022-06-08 DIAGNOSIS — U07.1 INFECTION DUE TO 2019 NOVEL CORONAVIRUS: ICD-10-CM

## 2022-06-08 DIAGNOSIS — R07.9 CHEST PAIN, UNSPECIFIED TYPE: ICD-10-CM

## 2022-06-08 LAB
ANION GAP SERPL CALCULATED.3IONS-SCNC: 8 MMOL/L (ref 5–18)
ATRIAL RATE - MUSE: 67 BPM
BASOPHILS # BLD AUTO: 0 10E3/UL (ref 0–0.2)
BASOPHILS NFR BLD AUTO: 1 %
BNP SERPL-MCNC: 121 PG/ML (ref 0–163)
BUN SERPL-MCNC: 22 MG/DL (ref 8–28)
CALCIUM SERPL-MCNC: 9.1 MG/DL (ref 8.5–10.5)
CHLORIDE BLD-SCNC: 111 MMOL/L (ref 98–107)
CO2 SERPL-SCNC: 26 MMOL/L (ref 22–31)
CREAT SERPL-MCNC: 1.02 MG/DL (ref 0.6–1.1)
DIASTOLIC BLOOD PRESSURE - MUSE: NORMAL MMHG
EOSINOPHIL # BLD AUTO: 0.1 10E3/UL (ref 0–0.7)
EOSINOPHIL NFR BLD AUTO: 1 %
ERYTHROCYTE [DISTWIDTH] IN BLOOD BY AUTOMATED COUNT: 12 % (ref 10–15)
GFR SERPL CREATININE-BSD FRML MDRD: 55 ML/MIN/1.73M2
GLUCOSE BLD-MCNC: 127 MG/DL (ref 70–125)
HCT VFR BLD AUTO: 41.4 % (ref 35–47)
HGB BLD-MCNC: 13.8 G/DL (ref 11.7–15.7)
IMM GRANULOCYTES # BLD: 0 10E3/UL
IMM GRANULOCYTES NFR BLD: 0 %
INTERPRETATION ECG - MUSE: NORMAL
LYMPHOCYTES # BLD AUTO: 1.8 10E3/UL (ref 0.8–5.3)
LYMPHOCYTES NFR BLD AUTO: 33 %
MAGNESIUM SERPL-MCNC: 2.2 MG/DL (ref 1.8–2.6)
MCH RBC QN AUTO: 30.5 PG (ref 26.5–33)
MCHC RBC AUTO-ENTMCNC: 33.3 G/DL (ref 31.5–36.5)
MCV RBC AUTO: 91 FL (ref 78–100)
MONOCYTES # BLD AUTO: 0.4 10E3/UL (ref 0–1.3)
MONOCYTES NFR BLD AUTO: 7 %
NEUTROPHILS # BLD AUTO: 3.1 10E3/UL (ref 1.6–8.3)
NEUTROPHILS NFR BLD AUTO: 58 %
NRBC # BLD AUTO: 0 10E3/UL
NRBC BLD AUTO-RTO: 0 /100
P AXIS - MUSE: 50 DEGREES
PLATELET # BLD AUTO: 169 10E3/UL (ref 150–450)
POTASSIUM BLD-SCNC: 4 MMOL/L (ref 3.5–5)
PR INTERVAL - MUSE: 146 MS
QRS DURATION - MUSE: 82 MS
QT - MUSE: 390 MS
QTC - MUSE: 412 MS
R AXIS - MUSE: 52 DEGREES
RBC # BLD AUTO: 4.53 10E6/UL (ref 3.8–5.2)
SODIUM SERPL-SCNC: 145 MMOL/L (ref 136–145)
SYSTOLIC BLOOD PRESSURE - MUSE: NORMAL MMHG
T AXIS - MUSE: 59 DEGREES
TROPONIN I SERPL-MCNC: <0.01 NG/ML (ref 0–0.29)
VENTRICULAR RATE- MUSE: 67 BPM
WBC # BLD AUTO: 5.3 10E3/UL (ref 4–11)

## 2022-06-08 PROCEDURE — 99285 EMERGENCY DEPT VISIT HI MDM: CPT | Mod: CS,25

## 2022-06-08 PROCEDURE — 83880 ASSAY OF NATRIURETIC PEPTIDE: CPT | Mod: GZ | Performed by: FAMILY MEDICINE

## 2022-06-08 PROCEDURE — 93005 ELECTROCARDIOGRAM TRACING: CPT | Performed by: FAMILY MEDICINE

## 2022-06-08 PROCEDURE — 83735 ASSAY OF MAGNESIUM: CPT | Performed by: FAMILY MEDICINE

## 2022-06-08 PROCEDURE — 84484 ASSAY OF TROPONIN QUANT: CPT | Performed by: FAMILY MEDICINE

## 2022-06-08 PROCEDURE — 71045 X-RAY EXAM CHEST 1 VIEW: CPT

## 2022-06-08 PROCEDURE — 85004 AUTOMATED DIFF WBC COUNT: CPT | Performed by: FAMILY MEDICINE

## 2022-06-08 PROCEDURE — 80048 BASIC METABOLIC PNL TOTAL CA: CPT | Performed by: FAMILY MEDICINE

## 2022-06-08 PROCEDURE — 36415 COLL VENOUS BLD VENIPUNCTURE: CPT | Performed by: FAMILY MEDICINE

## 2022-06-08 NOTE — TELEPHONE ENCOUNTER
Pt has been informed of below that she should be seen.  She has agreed to go to one or the other not sure yet but stated she will be seen.  She is also requesting Dr. López give her a call tomorrow

## 2022-06-08 NOTE — TELEPHONE ENCOUNTER
Chest has pain but not bad.  It comes and goes and started yesterday, is more today.  was positive for coronavirus. She tested positive for coronavirus today. Triage results in Got To ED/UCC Now or to office with PCP Approval. 2nd level triage needed. Please call Kalpana at:  [phone].  May leave a detailed message. Please direct in level of care today.  [facility]    Nurse Triage SBAR    Is this a 2nd Level Triage? YES, LICENSED PRACTITIONER REVIEW IS REQUIRED    Situation: Chest has pain but not bad.  It comes and goes and started yesterday, is more today.  was positive for coronavirus. She tested positive for coronavirus today. Triage results in Got To ED/UCC Now or to office with PCP Approval. 2nd level triage needed. Please call Kalpana at:  [phone].  May leave a detailed message. Please direct in level of care today.    Background:  had covid-19 first. She started with chest pain only, yesterday. Worse today.    Assessment: covid-19 positive, needs 2nd level triage.    Protocol Recommended Disposition:   Go To ED/UCC Now (Or To Office With PCP Approval)    Recommendation: 2nd level triage needed.     Routed to provider    Does the patient meet one of the following criteria for ADS visit consideration? No  [facility]    Reason for Disposition    Chest pain or pressure    Additional Information    Negative: SEVERE difficulty breathing (e.g., struggling for each breath, speaks in single words)    Negative: Difficult to awaken or acting confused (e.g., disoriented, slurred speech)    Negative: Bluish (or gray) lips or face now    Negative: Shock suspected (e.g., cold/pale/clammy skin, too weak to stand, low BP, rapid pulse)    Negative: Sounds like a life-threatening emergency to the triager    Negative: [1] Diagnosed or suspected COVID-19 AND [2] symptoms lasting 3 or more weeks    Negative: [1] COVID-19 exposure AND [2]  no symptoms    Negative: COVID-19 vaccine reaction suspected (e.g., fever, headache, muscle aches) occurring 1 to 3 days after getting vaccine    Negative: COVID-19 vaccine, questions about    Negative: [1] Lives with someone known to have influenza (flu test positive) AND [2] flu-like symptoms (e.g., cough, runny nose, sore throat, SOB; with or without fever)    Negative: [1] Adult with possible COVID-19 symptoms AND [2] triager concerned about severity of symptoms or other causes    Negative: COVID-19 and breastfeeding, questions about    Negative: SEVERE or constant chest pain or pressure  (Exception: Mild central chest pain, present only when coughing.)    Negative: MODERATE difficulty breathing (e.g., speaks in phrases, SOB even at rest, pulse 100-120)    Negative: Headache and stiff neck (can't touch chin to chest)    Negative: Oxygen level (e.g., pulse oximetry) 90 percent or lower     97%    Protocols used: CORONAVIRUS (COVID-19) DIAGNOSED OR [id number] 1.18.2022

## 2022-06-08 NOTE — ED PROVIDER NOTES
EMERGENCY DEPARTMENT ENCOUNTER      NAME: Kalpana Manzo  AGE: 82 year old female  YOB: 1940  MRN: [id number]  EVALUATION DATE & TIME: 6/8/2022  3:56 PM    PCP: Mela López    ED PROVIDER: Rustam Foy M.D.    Chief Complaint   Patient presents with     Chest Pain       FINAL IMPRESSION:  1. Infection due to 2019 novel coronavirus    2. Chest pain, unspecified type        ED COURSE & MEDICAL DECISION MAKING:    Pertinent Labs & Imaging studies personally reviewed and interpreted by me. (See chart for details)  4:17 PM  Patient seen and examined, prior records reviewed.  Differential diagnosis includes but not limited to chest wall pain, esophagitis, myocardial infarction, pneumonia, rib fracture, pulmonary embolism, pneumothorax, aortic dissection, aortic aneurysm.  Patient with cough positive COVID test this morning, having some intermittent chest pain since then.  Initially hypertensive here, no respiratory distress, oxygen saturations normal.  Labs and x-ray ordered.  Pulmonary embolism is unlikely based on clinical presentation.  Patient would be a candidate for antivirals, would need to hold her statin if she starts this.  5:24 PM labs are reassuring, patient is stable for discharge.  She will be given a prescription for Paxlovid.        At the conclusion of the encounter I discussed the results of all of the tests and the disposition. The questions were answered. The patient or family acknowledged understanding and was agreeable with the care plan.     PROCEDURES:   Procedures    MEDICATIONS GIVEN IN THE EMERGENCY:  Medications - No data to display    NEW PRESCRIPTIONS STARTED AT TODAY'S ER VISIT  New Prescriptions    NIRMATRELVIR AND RITONAVIR (PAXLOVID) THERAPY PACK    Take 2 tablets by mouth 2 times daily for 5 days Take 1 tablet of Nirmatrelvir and 1 tablet of Ritonavir twice daily for 5 days.  Throw away the extra Nirmatrelvir tablets.  "      =================================================================    HPI    Patient information was obtained from: Patient      Kalpana Manzo is a 82 year old female who presents with chest pain.  This morning she was feeling \"a little off\" with some lightheadedness and fatigue.  Her  was diagnosed with COVID last week and so she took a home COVID test which was positive.  She reports some intermittent chest pain today, sharp, across the middle of her chest, not related to movement or breathing.  Comes and goes.  No radiation, no associated nausea, vomiting, diarrhea, or abdominal pain.  She has not taken anything for her symptoms.  She denies any shortness of breath.    REVIEW OF SYSTEMS   Review of Systems   All other systems reviewed and negative    PAST MEDICAL HISTORY:  Past Medical History:   Diagnosis Date     Basal cell carcinoma of skin     Created by Conversion  Replacement Utility updated for latest IMO load     Contact dermatitis and other eczema, due to unspecified cause     Created by Conversion      Dermatophytosis of nail     Created by Conversion      Disorder of bone and cartilage     Created by Conversion  Replacement Utility updated for latest IMO load     Hepatitis     Created by Conversion [facility] Annotation: Aug 22 2011  9:09TRAMAINE - Mela López: in the 1980's  unspecified  Replacement Utility updated for latest IMO load     Hyperlipidemia     Created by Conversion      Nocturia     Created by Conversion      Osteoarthrosis involving lower leg     Created by Conversion  Replacement Utility updated for latest IMO load     Prediabetes 7/18/2016     Swelling of limb     Created by Conversion        PAST SURGICAL HISTORY:  Past Surgical History:   Procedure Laterality Date     BIOPSY BREAST Left 1987     BIOPSY BREAST Right 1992     BIOPSY OF BREAST, INCISIONAL      Description: Biopsy Breast Open;  Recorded: 05/19/2008;     HC REMOVE TONSILS/ADENOIDS,<13 Y/O      Description: " Tonsillectomy With Adenoidectomy;  Recorded: 05/19/2008;     REVISE SECONDARY VARICOSITY      Description: Varicose Vein Ligation;  Recorded: 05/19/2008;     TOTAL KNEE ARTHROPLASTY Right 08/2008     Z APPENDECTOMY      Description: Appendectomy;  Recorded: 05/19/2008;       CURRENT MEDICATIONS:    No current facility-administered medications for this encounter.     Current Outpatient Medications   Medication     nirmatrelvir and ritonavir (PAXLOVID) therapy pack     alendronate (FOSAMAX) 70 MG tablet     cholecalciferol, vitamin D3, (VITAMIN D3) 2,000 unit Tab     GLUCOSAM HCL/CHONDRO MONTES A/C/MN (GLUCOSAMINE-CHONDROITIN COMPLX ORAL)     multivitamin capsule     omega-3 fatty acids (FISH OIL CONCENTRATE) 1,000 mg cap     simvastatin (ZOCOR) 10 MG tablet     tolterodine ER (DETROL LA) 4 MG 24 hr capsule       ALLERGIES:  Allergies   Allergen Reactions     Bactrim [Sulfamethoxazole W/Trimethoprim] Dizziness     Sulfamethoxazole-Trimethoprim Dizziness       FAMILY HISTORY:  Family History   Problem Relation Age of Onset     Varicose Veins Mother        SOCIAL HISTORY:   Social History     Socioeconomic History     Marital status:    Tobacco Use     Smoking status: Never Smoker     Smokeless tobacco: Never Used   Substance and Sexual Activity     Alcohol use: Yes     Comment: Alcoholic Drinks/day: rare     Drug use: No   Social History Narrative    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   Constitutional:       Appearance: Normal appearance.   HENT:      Head: Normocephalic and atraumatic.      Right Ear: External ear normal.      Left Ear: External ear normal.      Nose: Nose normal.      Mouth/Throat:      Mouth: Mucous membranes are moist.   Eyes:      Extraocular Movements: Extraocular movements intact.       Conjunctiva/sclera: Conjunctivae normal.      Pupils: Pupils are equal, round, and reactive to light.   Cardiovascular:      Rate and Rhythm: Normal rate and regular rhythm.   Pulmonary:      Effort: Pulmonary effort is normal.      Breath sounds: Normal breath sounds. No wheezing or rales.   Abdominal:      General: Abdomen is flat. There is no distension.      Palpations: Abdomen is soft.      Tenderness: There is no abdominal tenderness. There is no guarding.   Musculoskeletal:         General: Normal range of motion.      Cervical back: Normal range of motion and neck supple.      Right lower leg: No edema.      Left lower leg: No edema.   Lymphadenopathy:      Cervical: No cervical adenopathy.   Skin:     General: Skin is warm and dry.      Capillary Refill: Capillary refill takes less than 2 seconds.   Neurological:      General: No focal deficit present.      Mental Status: She is alert and oriented to person, place, and time. Mental status is at baseline.      Comments: No gross focal neurologic deficits   Psychiatric:         Mood and Affect: Mood normal.         Behavior: Behavior normal.         Thought Content: Thought content normal.          LAB:  All pertinent labs reviewed and interpreted.  Results for orders placed or performed during the hospital encounter of 06/08/22   XR Chest Port 1 View    Impression    IMPRESSION: Slight eventration of the right hemidiaphragm. Lungs are clear. No hydropneumothorax or fracture. Heart and pulmonary vascularity are normal. No signs of acute disease   Basic metabolic panel   Result Value Ref Range    Sodium 145 136 - 145 mmol/L    Potassium 4.0 3.5 - 5.0 mmol/L    Chloride 111 (H) 98 - 107 mmol/L    Carbon Dioxide (CO2) 26 22 - 31 mmol/L    Anion Gap 8 5 - 18 mmol/L    Urea Nitrogen 22 8 - 28 mg/dL    Creatinine 1.02 0.60 - 1.10 mg/dL    Calcium 9.1 8.5 - 10.5 mg/dL    Glucose 127 (H) 70 - 125 mg/dL    GFR Estimate 55 (L) >60 mL/min/1.73m2   Result Value Ref Range     Magnesium 2.2 1.8 - 2.6 mg/dL   Result Value Ref Range    Troponin I <0.01 0.00 - 0.29 ng/mL   B-Type Natriuretic Peptide ([facility] Only)   Result Value Ref Range     0 - 163 pg/mL   CBC with platelets and differential   Result Value Ref Range    WBC Count 5.3 4.0 - 11.0 10e3/uL    RBC Count 4.53 3.80 - 5.20 10e6/uL    Hemoglobin 13.8 11.7 - 15.7 g/dL    Hematocrit 41.4 35.0 - 47.0 %    MCV 91 78 - 100 fL    MCH 30.5 26.5 - 33.0 pg    MCHC 33.3 31.5 - 36.5 g/dL    RDW 12.0 10.0 - 15.0 %    Platelet Count 169 150 - 450 10e3/uL    % Neutrophils 58 %    % Lymphocytes 33 %    % Monocytes 7 %    % Eosinophils 1 %    % Basophils 1 %    % Immature Granulocytes 0 %    NRBCs per 100 WBC 0 <1 /100    Absolute Neutrophils 3.1 1.6 - 8.3 10e3/uL    Absolute Lymphocytes 1.8 0.8 - 5.3 10e3/uL    Absolute Monocytes 0.4 0.0 - 1.3 10e3/uL    Absolute Eosinophils 0.1 0.0 - 0.7 10e3/uL    Absolute Basophils 0.0 0.0 - 0.2 10e3/uL    Absolute Immature Granulocytes 0.0 <=0.4 10e3/uL    Absolute NRBCs 0.0 10e3/uL   ECG 12-LEAD WITH MUSE ([facility])   Result Value Ref Range    Systolic Blood Pressure  mmHg    Diastolic Blood Pressure  mmHg    Ventricular Rate 67 BPM    Atrial Rate 67 BPM    AL Interval 146 ms    QRS Duration 82 ms     ms    QTc 412 ms    P Axis 50 degrees    R AXIS 52 degrees    T Axis 59 degrees    Interpretation ECG       Sinus rhythm with occasional Premature ventricular complexes  Nonspecific ST abnormality  Abnormal ECG  No previous ECGs available  Confirmed by SEE ED PROVIDER NOTE FOR, ECG INTERPRETATION ([id number]),  KRYSTAL WILLOUGHBY ([id number]) on 6/8/2022 4:37:10 PM         RADIOLOGY:  Reviewed all pertinent imaging. Please see official radiology report.  XR Chest Port 1 View   Final Result   IMPRESSION: Slight eventration of the right hemidiaphragm. Lungs are clear. No hydropneumothorax or fracture. Heart and pulmonary vascularity are normal. No signs of acute disease          EKG:    Performed at: 3:56  PM  Impression: PVC, nonspecific ST changes, no acute ischemic changes  Rate: 67  Rhythm: Sinus  Axis: Normal  [place] Interval: 146  QRS Interval: 82  QTc Interval: 412  ST Changes: Slight ST depression in 2 and aVF, no acute elevations  Comparison: No prior for comparison    I have independently reviewed and interpreted the EKG(s) documented above.    Rustam Foy M.D.  Emergency Medicine  [facility] EMERGENCY ROOM  Counts include 234 beds at the [facility] [id number] [phone]  Dept: [phone]     Rustam Foy MD  06/08/22 [id number]

## 2022-06-08 NOTE — ED TRIAGE NOTES
Patient is here with intermittent mid chest pain that started this morning. She also complains of dizziness. She does have a slight dry cough. No shortness of breath noted. Covid positive dx yesterday.

## 2022-06-16 ENCOUNTER — HOSPITAL ENCOUNTER (EMERGENCY)
Facility: CLINIC | Age: 82
Discharge: HOME | End: 2022-06-16
Attending: EMERGENCY MEDICINE | Admitting: EMERGENCY MEDICINE
Payer: COMMERCIAL

## 2022-06-16 ENCOUNTER — APPOINTMENT (OUTPATIENT)
Dept: CT IMAGING | Facility: CLINIC | Age: 82
End: 2022-06-16
Attending: EMERGENCY MEDICINE
Payer: COMMERCIAL

## 2022-06-16 VITALS
HEIGHT: 66 IN | WEIGHT: 149 LBS | SYSTOLIC BLOOD PRESSURE: 162 MMHG | HEART RATE: 72 BPM | DIASTOLIC BLOOD PRESSURE: 87 MMHG | BODY MASS INDEX: 23.95 KG/M2 | TEMPERATURE: 97.1 F | OXYGEN SATURATION: 97 % | RESPIRATION RATE: 16 BRPM

## 2022-06-16 DIAGNOSIS — U07.1 INFECTION DUE TO 2019 NOVEL CORONAVIRUS: ICD-10-CM

## 2022-06-16 DIAGNOSIS — R07.89 ATYPICAL CHEST PAIN: ICD-10-CM

## 2022-06-16 LAB
ANION GAP SERPL CALCULATED.3IONS-SCNC: 9 MMOL/L (ref 5–18)
ATRIAL RATE - MUSE: 71 BPM
BASOPHILS # BLD AUTO: 0 10E3/UL (ref 0–0.2)
BASOPHILS NFR BLD AUTO: 1 %
BUN SERPL-MCNC: 21 MG/DL (ref 8–28)
CALCIUM SERPL-MCNC: 9 MG/DL (ref 8.5–10.5)
CHLORIDE BLD-SCNC: 110 MMOL/L (ref 98–107)
CO2 SERPL-SCNC: 23 MMOL/L (ref 22–31)
CREAT SERPL-MCNC: 1.03 MG/DL (ref 0.6–1.1)
D DIMER PPP FEU-MCNC: 1.58 UG/ML FEU (ref 0–0.5)
DIASTOLIC BLOOD PRESSURE - MUSE: NORMAL MMHG
EOSINOPHIL # BLD AUTO: 0.2 10E3/UL (ref 0–0.7)
EOSINOPHIL NFR BLD AUTO: 3 %
ERYTHROCYTE [DISTWIDTH] IN BLOOD BY AUTOMATED COUNT: 12.3 % (ref 10–15)
GFR SERPL CREATININE-BSD FRML MDRD: 54 ML/MIN/1.73M2
GLUCOSE BLD-MCNC: 142 MG/DL (ref 70–125)
HCT VFR BLD AUTO: 42.9 % (ref 35–47)
HGB BLD-MCNC: 13.5 G/DL (ref 11.7–15.7)
HOLD SPECIMEN: NORMAL
IMM GRANULOCYTES # BLD: 0 10E3/UL
IMM GRANULOCYTES NFR BLD: 0 %
INTERPRETATION ECG - MUSE: NORMAL
LYMPHOCYTES # BLD AUTO: 2.1 10E3/UL (ref 0.8–5.3)
LYMPHOCYTES NFR BLD AUTO: 37 %
MCH RBC QN AUTO: 30.4 PG (ref 26.5–33)
MCHC RBC AUTO-ENTMCNC: 31.5 G/DL (ref 31.5–36.5)
MCV RBC AUTO: 97 FL (ref 78–100)
MONOCYTES # BLD AUTO: 0.4 10E3/UL (ref 0–1.3)
MONOCYTES NFR BLD AUTO: 8 %
NEUTROPHILS # BLD AUTO: 2.9 10E3/UL (ref 1.6–8.3)
NEUTROPHILS NFR BLD AUTO: 51 %
NRBC # BLD AUTO: 0 10E3/UL
NRBC BLD AUTO-RTO: 0 /100
P AXIS - MUSE: 38 DEGREES
PLATELET # BLD AUTO: 162 10E3/UL (ref 150–450)
POTASSIUM BLD-SCNC: 3.8 MMOL/L (ref 3.5–5)
PR INTERVAL - MUSE: 152 MS
QRS DURATION - MUSE: 88 MS
QT - MUSE: 366 MS
QTC - MUSE: 397 MS
R AXIS - MUSE: 13 DEGREES
RBC # BLD AUTO: 4.44 10E6/UL (ref 3.8–5.2)
SODIUM SERPL-SCNC: 142 MMOL/L (ref 136–145)
SYSTOLIC BLOOD PRESSURE - MUSE: NORMAL MMHG
T AXIS - MUSE: 18 DEGREES
TROPONIN I SERPL-MCNC: <0.01 NG/ML (ref 0–0.29)
TROPONIN T BLD-MCNC: 0 UG/L
VENTRICULAR RATE- MUSE: 71 BPM
WBC # BLD AUTO: 5.6 10E3/UL (ref 4–11)

## 2022-06-16 PROCEDURE — 99285 EMERGENCY DEPT VISIT HI MDM: CPT | Mod: 25

## 2022-06-16 PROCEDURE — 71275 CT ANGIOGRAPHY CHEST: CPT

## 2022-06-16 PROCEDURE — 93005 ELECTROCARDIOGRAM TRACING: CPT | Performed by: EMERGENCY MEDICINE

## 2022-06-16 PROCEDURE — 93005 ELECTROCARDIOGRAM TRACING: CPT

## 2022-06-16 PROCEDURE — 80048 BASIC METABOLIC PNL TOTAL CA: CPT | Performed by: EMERGENCY MEDICINE

## 2022-06-16 PROCEDURE — 250N000011 HC RX IP 250 OP 636: Performed by: EMERGENCY MEDICINE

## 2022-06-16 PROCEDURE — 84484 ASSAY OF TROPONIN QUANT: CPT | Performed by: EMERGENCY MEDICINE

## 2022-06-16 PROCEDURE — 36415 COLL VENOUS BLD VENIPUNCTURE: CPT | Performed by: EMERGENCY MEDICINE

## 2022-06-16 PROCEDURE — 85379 FIBRIN DEGRADATION QUANT: CPT | Performed by: EMERGENCY MEDICINE

## 2022-06-16 PROCEDURE — 85025 COMPLETE CBC W/AUTO DIFF WBC: CPT | Performed by: EMERGENCY MEDICINE

## 2022-06-16 RX ORDER — IOPAMIDOL 755 MG/ML
100 INJECTION, SOLUTION INTRAVASCULAR ONCE
Status: COMPLETED | OUTPATIENT
Start: 2022-06-16 | End: 2022-06-16

## 2022-06-16 RX ADMIN — IOPAMIDOL 100 ML: 755 INJECTION, SOLUTION INTRAVENOUS at 10:02

## 2022-06-16 ASSESSMENT — ENCOUNTER SYMPTOMS
FEVER: 0
LIGHT-HEADEDNESS: 0
SHORTNESS OF BREATH: 0
RESPIRATORY NEGATIVE: 1

## 2022-06-16 NOTE — ED PROVIDER NOTES
EMERGENCY DEPARTMENT ENCOUNTER      NAME: Kalpana Manzo  AGE: 82 year old female  YOB: 1940  MRN: [id number]  EVALUATION DATE & TIME: 2022  8:12 AM    PCP: Mela López    ED PROVIDER: Constantin Verduzco M.D.      Chief Complaint   Patient presents with     Chest Pain         FINAL IMPRESSION:  1.  Atypical chest pain.      ED COURSE & MEDICAL DECISION MAKIN:39 AM I met with the patient to gather history and to perform my initial exam. We discussed plans for the ED course, including diagnostic testing and treatment. PPE worn: cloth mask.  Patient notes chest pain since yesterday.  She denies any shortness of breath or lightheadedness with it.  She had the same when she was initially diagnosed with COVID on .  She denies any respiratory symptoms.  She denies fever or altered taste.  No cardiac history.  10:23 AM I rechecked and updated the patient.  Troponin and EKG unremarkable despite chest pain since yesterday.  This effectively rules out an acute MI.  D-dimer elevated but PE run negative for PE or other acute pathology.  Patient is discharged to home.  She will follow-up with her doctor/clinic within the next week.  Patient in agreement with the plan.  I discussed the plan for discharge with the patient, and patient is agreeable. We discussed supportive cares at home and reasons for return to the ER including new or worsening symptoms - all questions and concerns addressed. Patient to be discharged by RN.     Pertinent Labs & Imaging studies reviewed. (See chart for details)    82 year old female presents to the Emergency Department for evaluation of chest pain.    At the conclusion of the encounter I discussed the results of all of the tests and the disposition. The questions were answered. The patient or family acknowledged understanding and was agreeable with the care plan.         MEDICATIONS GIVEN IN THE EMERGENCY:  Medications   iopamidol (ISOVUE-370) solution 100 mL (100  mLs Intravenous Given 6/16/22 1002)       NEW PRESCRIPTIONS STARTED AT TODAY'S ER VISIT  New Prescriptions    No medications on file          =================================================================    HPI    Patient information was obtained from: the patient     Use of : N/A         Kalpana Manzo is a 82 year old female with a pertinent history of prediabetes, skin cancer, and hyperlipidemia, who presents to this ED via walk in for evaluation of chest pain.     Per Chart Review:   6/8/2022 the patient presented to [facility] ED for evaluation of chest pain. The patient tested positive for COVID-19 just prior to presenting to the ED. The patient's exam and lab work were unremarkable. She was discharged home with Paxlovid.     The patient reports the onset of substernal chest pain yesterday. This pain has been continuous since its onset. She states that this pain is similar to the pain she experienced when she was diagnosed with COVID-19 on 6/8 (~8 days ago). She denies a history of cardiac disease. The patient denies shortness of breath, fever, loss of taste, lightheadedness, and any other symptoms or complaints at this time.     She does not identify any waxing or waning symptoms otherwise, exacerbating or alleviating features,associated symptoms except as mentioned.    REVIEW OF SYSTEMS   Review of Systems   Constitutional: Negative for fever.   HENT:        Negative for loss of taste.    Respiratory: Negative.  Negative for shortness of breath.    Cardiovascular: Positive for chest pain (substernal).   Neurological: Negative for light-headedness.   All other systems reviewed and are negative.       PAST MEDICAL HISTORY:  Past Medical History:   Diagnosis Date     Basal cell carcinoma of skin     Created by Conversion  Replacement Utility updated for latest IMO load     Contact dermatitis and other eczema, due to unspecified cause     Created by Conversion      Dermatophytosis of nail      Created by Conversion      Disorder of bone and cartilage     Created by Conversion  Replacement Utility updated for latest IMO load     Hepatitis     Created by Conversion [facility] Annotation: Aug 22 2011  9:09AM - Mela López: in the 1980's  unspecified  Replacement Utility updated for latest IMO load     Hyperlipidemia     Created by Conversion      Nocturia     Created by Conversion      Osteoarthrosis involving lower leg     Created by Conversion  Replacement Utility updated for latest IMO load     Prediabetes 7/18/2016     Swelling of limb     Created by Conversion        PAST SURGICAL HISTORY:  Past Surgical History:   Procedure Laterality Date     BIOPSY BREAST Left 1987     BIOPSY BREAST Right 1992     BIOPSY OF BREAST, INCISIONAL      Description: Biopsy Breast Open;  Recorded: 05/19/2008;     HC REMOVE TONSILS/ADENOIDS,<13 Y/O      Description: Tonsillectomy With Adenoidectomy;  Recorded: 05/19/2008;     REVISE SECONDARY VARICOSITY      Description: Varicose Vein Ligation;  Recorded: 05/19/2008;     TOTAL KNEE ARTHROPLASTY Right 08/2008     [facility] APPENDECTOMY      Description: Appendectomy;  Recorded: 05/19/2008;           CURRENT MEDICATIONS:    alendronate (FOSAMAX) 70 MG tablet  cholecalciferol, vitamin D3, (VITAMIN D3) 2,000 unit Tab  GLUCOSAM HCL/CHONDRO MONTES A/C/MN (GLUCOSAMINE-CHONDROITIN COMPLX ORAL)  multivitamin capsule  omega-3 fatty acids (FISH OIL CONCENTRATE) 1,000 mg cap  simvastatin (ZOCOR) 10 MG tablet  tolterodine ER (DETROL LA) 4 MG 24 hr capsule        ALLERGIES:  Allergies   Allergen Reactions     Bactrim [Sulfamethoxazole W/Trimethoprim] Dizziness     Sulfamethoxazole-Trimethoprim Dizziness       FAMILY HISTORY:  Family History   Problem Relation Age of Onset     Varicose Veins Mother        SOCIAL HISTORY:   Social History     Socioeconomic History     Marital status:      Spouse name: None     Number of children: None     Years of education: None     Highest education  "level: None   Tobacco Use     Smoking status: Never Smoker     Smokeless tobacco: Never Used   Substance and Sexual Activity     Alcohol use: Yes     Comment: Alcoholic Drinks/day: rare     Drug use: No   Social History Narrative    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    VITALS:  BP (!) 161/74   Pulse 66   Temp 97.1  F (36.2  C) (Temporal)   Resp 14   Ht 1.676 m (5' 6\")   Wt 67.6 kg (149 lb)   SpO2 97%   BMI 24.05 kg/m      PHYSICAL EXAM    Vital Signs:  BP (!) 161/74   Pulse 66   Temp 97.1  F (36.2  C) (Temporal)   Resp 14   Ht 1.676 m (5' 6\")   Wt 67.6 kg (149 lb)   SpO2 97%   BMI 24.05 kg/m    General:  On entering the room the patient is in no apparent distress.    Neck:  Neck supple with full range of motion and nontender.    Back:  Back and spine are nontender.  No costovertebral angle tenderness.    HEENT:  Oropharynx clear with moist mucous membranes.  HEENT unremarkable.    Pulmonary:  Chest clear to auscultation without rhonchi rales or wheezing.  No pain with palpation, inspiration, or movement.  Cardiovascular:  Cardiac regular rate and rhythm without murmurs rubs or gallops.    Abdomen:  Abdomen soft nontender.  There is no rebound or guarding.    Muskuloskeletal:  she moves all 4 without any difficulty and has normal neurovascular exams.  Extremities without clubbing, cyanosis, or edema.  Legs and calves are nontender.    Neuro:  she is alert and oriented ×3 and moves all extremities symmetrically.    Psych:  Normal affect.    Skin:  Unremarkable and warm and dry.       LAB:  All pertinent labs reviewed and interpreted.  Labs Ordered and Resulted from Time of ED Arrival to Time of ED Departure   BASIC METABOLIC PANEL - Abnormal       Result Value    Sodium 142      Potassium 3.8      Chloride 110 (*)     Carbon Dioxide (CO2) 23      Anion Gap 9      Urea Nitrogen 21      Creatinine 1.03      Calcium 9.0      Glucose 142 (*)     GFR Estimate 54 (*)    D DIMER " QUANTITATIVE - Abnormal    D-Dimer Quantitative 1.58 (*)    TROPONIN I - Normal    Troponin I <0.01     ISTAT TROPONIN POCT - Normal    TROPPC POCT 0.00     CBC WITH PLATELETS AND DIFFERENTIAL    WBC Count 5.6      RBC Count 4.44      Hemoglobin 13.5      Hematocrit 42.9      MCV 97      MCH 30.4      MCHC 31.5      RDW 12.3      Platelet Count 162      % Neutrophils 51      % Lymphocytes 37      % Monocytes 8      % Eosinophils 3      % Basophils 1      % Immature Granulocytes 0      NRBCs per 100 WBC 0      Absolute Neutrophils 2.9      Absolute Lymphocytes 2.1      Absolute Monocytes 0.4      Absolute Eosinophils 0.2      Absolute Basophils 0.0      Absolute Immature Granulocytes 0.0      Absolute NRBCs 0.0         RADIOLOGY:  Reviewed all pertinent imaging. Please see official radiology report.  CT Chest Pulmonary Embolism w Contrast   Final Result   IMPRESSION:      1.  No acute pulmonary embolism or aortopathy.   2.  No acute lung, airway, or pleural inflammatory process.   3.  Cholelithiasis.                 EKG:    Normal sinus rhythm 71, nonspecific ST segment changes, no acute findings.  Very similar to previous EKG of June 8 but PVCs have resolved.    I have independently reviewed and interpreted the EKG(s) documented above.    PROCEDURES:   None.       I, Dodie Barnett, am serving as a scribe to document services personally performed by Dr. Verduzco based on my observation and the provider's statements to me. I, Constantin Verduzco MD attest that Dodie Sevillaton is acting in a scribe capacity, has observed my performance of the services and has documented them in accordance with my direction.    Constantin Verduzco M.D.  Emergency Medicine  [facility] EMERGENCY ROOM  [address] [id number] [phone]  Dept: [phone]      Constantin Verduzco MD  06/16/22 [id number]

## 2022-06-16 NOTE — DISCHARGE INSTRUCTIONS
Tylenol or ibuprofen as tolerated can help with pain.  Ice or heat off-and-on to sore areas can help with pain.  See your clinic for follow-up within the next 7 days.  See them sooner or return if worse or problems.

## 2022-06-16 NOTE — ED TRIAGE NOTES
"The patient presents to the ED with chest and back pain that began on Wednesday. The patient reports she got covid that started on 6/8/22. The patient had an EKG at the clinic on the day she was diagnosed for chest pain as well. The patient reports she took all her \"covid medications\" and then began to feel chest pain again after completing it. The patient denies shortness of breath or dizziness.       "

## 2022-07-21 ENCOUNTER — HOSPITAL ENCOUNTER (OUTPATIENT)
Dept: MAMMOGRAPHY | Facility: CLINIC | Age: 82
Discharge: HOME | End: 2022-07-21
Attending: FAMILY MEDICINE | Admitting: FAMILY MEDICINE
Payer: COMMERCIAL

## 2022-07-21 DIAGNOSIS — Z12.31 VISIT FOR SCREENING MAMMOGRAM: ICD-10-CM

## 2022-07-21 PROCEDURE — 77067 SCR MAMMO BI INCL CAD: CPT

## 2022-08-31 ENCOUNTER — OFFICE VISIT (OUTPATIENT)
Dept: FAMILY MEDICINE | Facility: CLINIC | Age: 82
End: 2022-08-31
Payer: COMMERCIAL

## 2022-08-31 VITALS
SYSTOLIC BLOOD PRESSURE: 136 MMHG | HEIGHT: 66 IN | RESPIRATION RATE: 20 BRPM | TEMPERATURE: 98 F | DIASTOLIC BLOOD PRESSURE: 76 MMHG | BODY MASS INDEX: 24.77 KG/M2 | WEIGHT: 154.13 LBS | HEART RATE: 64 BPM

## 2022-08-31 DIAGNOSIS — Z00.00 ENCOUNTER FOR MEDICARE ANNUAL WELLNESS EXAM: Primary | ICD-10-CM

## 2022-08-31 PROCEDURE — G0439 PPPS, SUBSEQ VISIT: HCPCS | Performed by: FAMILY MEDICINE

## 2022-08-31 ASSESSMENT — ENCOUNTER SYMPTOMS
ARTHRALGIAS: 0
FREQUENCY: 1
PALPITATIONS: 0
WEAKNESS: 0
DYSURIA: 0
HEMATOCHEZIA: 0
FEVER: 0
CHILLS: 0
NAUSEA: 0
BREAST MASS: 0
SHORTNESS OF BREATH: 0
LEG PAIN: 1
SORE THROAT: 0
PARESTHESIAS: 0
DIARRHEA: 0
HEARTBURN: 0
ABDOMINAL PAIN: 0
CONSTIPATION: 1
COUGH: 0
HEADACHES: 1
JOINT SWELLING: 0
MYALGIAS: 0
DIZZINESS: 0
EYE PAIN: 0
HEMATURIA: 0
NERVOUS/ANXIOUS: 0

## 2022-08-31 ASSESSMENT — ACTIVITIES OF DAILY LIVING (ADL): CURRENT_FUNCTION: NO ASSISTANCE NEEDED

## 2022-08-31 NOTE — PATIENT INSTRUCTIONS
Patient Education   Personalized Prevention Plan  You are due for the preventive services outlined below.  Your care team is available to assist you in scheduling these services.  If you have already completed any of these items, please share that information with your care team to update in your medical record.  Health Maintenance Due   Topic Date Due     Flu Vaccine (1) 09/01/2022       Signs of Hearing Loss      Hearing much better with one ear can be a sign of hearing loss.   Hearing loss is a problem shared by many people. In fact, it is one of the most common health problems, particularly as people age. Most people age 65 and older have some hearing loss. By age 80, almost everyone does. Hearing loss often occurs slowly over the years. So you may not realize your hearing has gotten worse.  Have your hearing checked  Call your healthcare provider if you:    Have to strain to hear normal conversation    Have to watch other people s faces very carefully to follow what they re saying    Need to ask people to repeat what they ve said    Often misunderstand what people are saying    Turn the volume of the television or radio up so high that others complain    Feel that people are mumbling when they re talking to you    Find that the effort to hear leaves you feeling tired and irritated    Notice, when using the phone, that you hear better with one ear than the other  [employer] last reviewed this educational content on 1/1/2020 2000-2021 [employer]. All rights reserved. This information is not intended as a substitute for professional medical care. Always follow your healthcare professional's instructions.          Urinary Incontinence, Female (Adult)   Urinary incontinence means loss of bladder control. This problem affects many women, especially as they get older. If you have incontinence, you may be embarrassed to ask for help. But know that this problem can be treated.   Types of  Incontinence  There are different types of incontinence. Two of the main types are described here. You can have more than one type.     Stress incontinence. With this type, urine leaks when pressure (stress) is put on the bladder. This may happen when you cough, sneeze, or laugh. Stress incontinence most often occurs because the pelvic floor muscles that support the bladder and urethra are weak. This can happen after pregnancy and vaginal childbirth or a hysterectomy. It can also be due to excess body weight or hormone changes.    Urge incontinence (also called overactive bladder). With this type, a sudden urge to urinate is felt often. This may happen even though there may not be much urine in the bladder. The need to urinate often during the night is common. Urge incontinence most often occurs because of bladder spasms. This may be due to bladder irritation or infection. Damage to bladder nerves or pelvic muscles, constipation, and certain medicines can also lead to urge incontinence.  Treatment depends on the cause. Further evaluation is needed to find the type you have. This will likely include an exam and certain tests. Based on the results, you and your healthcare provider can then plan treatment. Until a diagnosis is made, the home care tips below can help ease symptoms.   Home care    Do pelvic floor muscle exercises, if they are prescribed. The pelvic floor muscles help support the bladder and urethra. Many women find that their symptoms improve when doing special exercises that strengthen these muscles. To do the exercises, contract the muscles you would use to stop your stream of urine. But do this when you re not urinating. Hold for 10 seconds, then relax. Repeat 10 to 20 times in a row, at least 3 times a day. Your healthcare provider may give you other instructions for how to do the exercises and how often.    Keep a bladder diary. This helps track how often and how much you urinate over a set period  of time. Bring this diary with you to your next visit with the provider. The information can help your provider learn more about your bladder problem.    Lose weight, if advised to by your provider. Extra weight puts pressure on the bladder. Your provider can help you create a weight-loss plan that s right for you. This may include exercising more and making certain diet changes.    Don't have foods and drinks that may irritate the bladder. These can include alcohol and caffeinated drinks.    Quit smoking. Smoking and other tobacco use can lead to a long-term (chronic) cough that strains the pelvic floor muscles. Smoking may also damage the bladder and urethra. Talk with your provider about treatments or methods you can use to quit smoking.    If drinking large amounts of fluid makes you have symptoms, you may be advised to limit your fluid intake. You may also be advised to drink most of your fluids during the day and to limit fluids at night.    If you re worried about urine leakage or accidents, you may wear absorbent pads to catch urine. Change the pads often. This helps reduce discomfort. It may also reduce the risk of skin or bladder infections.    Follow-up care  Follow up with your healthcare provider, or as directed. It may take some to find the right treatment for your problem. But healthy lifestyle changes can be made right away. These include such things as exercising on a regular basis, eating a healthy diet, losing weight (if needed), and quitting smoking. Your treatment plan may include special therapies or medicines. Certain procedures or surgery may also be options. Talk about any questions you have with your provider.   When to seek medical advice  Call the healthcare provider right away if any of these occur:    Fever of 100.4 F (38 C) or higher, or as directed by your provider    Bladder pain or fullness    Belly swelling    Nausea or vomiting    Back pain    Weakness, dizziness, or  linda Banuelos last reviewed this educational content on 1/1/2020 2000-2021 [employer]. All rights reserved. This information is not intended as a substitute for professional medical care. Always follow your healthcare professional's instructions.

## 2022-08-31 NOTE — PROGRESS NOTES
"SUBJECTIVE:   Kalpana Manzo is a 82 year old female who presents for Preventive Visit.    HPI: Kalpana is a very pleasant 82-year-old female presenting today accompanied by her  for an annual wellness visit.  The patient really is well overall.  She does occasionally get leg pains off and on.  Sometimes she feels this in the knee and sometimes in her lower leg.  This probably only occurs about once a week and when she applies Bengay, the symptom seems to resolve fairly quickly.  She has no other specific complaints or concerns today.  She also would like me to perform a pelvic exam today.  She does have a history of a cystocele but says that sometimes when she takes her pad and underwear off, she will feel a slight burning sensation on her bottom.      Patient has been advised of split billing requirements and indicates understanding: Yes  Are you in the first 12 months of your Medicare coverage?  No    Leg Pain  Associated symptoms include headaches. Pertinent negatives include no abdominal pain, arthralgias, chest pain, chills, congestion, coughing, fever, joint swelling, myalgias, nausea, rash, sore throat or weakness.   Healthy Habits:     In general, how would you rate your overall health?  Good    Frequency of exercise:  2-3 days/week    Duration of exercise:  15-30 minutes    Do you usually eat at least 4 servings of fruit and vegetables a day, include whole grains    & fiber and avoid regularly eating high fat or \"junk\" foods?  Yes    Taking medications regularly:  Yes    Medication side effects:  None    Ability to successfully perform activities of daily living:  No assistance needed    Home Safety:  No safety concerns identified    Hearing Impairment:  Difficulty following a conversation in a noisy restaurant or crowded room    In the past 6 months, have you been bothered by leaking of urine? Yes    In general, how would you rate your overall mental or emotional health?  Good      PHQ-2 Total Score: " 0    Additional concerns today:  Yes    Do you feel safe in your environment? Yes    Have you ever done Advance Care Planning? (For example, a Health Directive, POLST, or a discussion with a medical provider or your loved ones about your wishes): Yes, advance care planning is on file.       Fall risk  Fallen 2 or more times in the past year?: No  Any fall with injury in the past year?: No    Cognitive Screening   1) Repeat 3 items (Leader, Season, Table)  PASS  2) Clock draw: NORMAL  3) 3 item recall: Recalls 3 objects  Results: 3 items recalled: COGNITIVE IMPAIRMENT LESS LIKELY    Mini-CogTM Copyright S Emily. Licensed by the author for use in [facility]; reprinted with permission (soob@[facility]). All rights reserved.      Do you have sleep apnea, excessive snoring or daytime drowsiness?: no    Reviewed and updated as needed this visit by clinical staff   Tobacco  Allergies  Meds                Reviewed and updated as needed this visit by Provider                   Social History     Tobacco Use     Smoking status: Never Smoker     Smokeless tobacco: Never Used   Substance Use Topics     Alcohol use: Yes     Comment: Alcoholic Drinks/day: rare     If you drink alcohol do you typically have >3 drinks per day or >7 drinks per week? Yes      Alcohol Use 8/31/2022   Prescreen: >3 drinks/day or >7 drinks/week? Not Applicable   Prescreen: >3 drinks/day or >7 drinks/week? -             Current providers sharing in care for this patient include:   Patient Care Team:  Mela López as PCP - General  Mela López as Assigned PCP  Saji Morocho DPM as Assigned Musculoskeletal Provider    The following health maintenance items are reviewed in Epic and correct as of today:  Health Maintenance Due   Topic Date Due     MEDICARE ANNUAL WELLNESS VISIT  08/30/2022     INFLUENZA VACCINE (1) 09/01/2022     Patient Active Problem List   Diagnosis     Hepatitis     Basal Cell Carcinoma Of The Skin      Hyperlipidemia     Onychomycosis     Osteopenia     Osteoarthritis Of The Knee     Eczema     Limb Swelling     Prediabetes     Varicose vein of leg     Midline cystocele     Seasonal allergic rhinitis     Overactive bladder     Hoarseness     Past Surgical History:   Procedure Laterality Date     BIOPSY BREAST Left 1987     BIOPSY BREAST Right 1992     BIOPSY OF BREAST, INCISIONAL      Description: Biopsy Breast Open;  Recorded: 05/19/2008;     HC REMOVE TONSILS/ADENOIDS,<13 Y/O      Description: Tonsillectomy With Adenoidectomy;  Recorded: 05/19/2008;     REVISE SECONDARY VARICOSITY      Description: Varicose Vein Ligation;  Recorded: 05/19/2008;     TOTAL KNEE ARTHROPLASTY Right 08/2008     ZZC APPENDECTOMY      Description: Appendectomy;  Recorded: 05/19/2008;       Social History     Tobacco Use     Smoking status: Never Smoker     Smokeless tobacco: Never Used   Substance Use Topics     Alcohol use: Yes     Comment: Alcoholic Drinks/day: rare     Family History   Problem Relation Age of Onset     Varicose Veins Mother          Current Outpatient Medications   Medication Sig Dispense Refill     alendronate (FOSAMAX) 70 MG tablet Take 1 tablet (70 mg) by mouth every 7 days 12 tablet 3     cholecalciferol, vitamin D3, (VITAMIN D3) 2,000 unit Tab 1,000 Units daily       GLUCOSAM HCL/CHONDRO MONTES A/C/MN (GLUCOSAMINE-CHONDROITIN COMPLX ORAL) [GLUCOSAM HCL/CHONDRO MONTES A/C/MN (GLUCOSAMINE-CHONDROITIN COMPLX ORAL)] Take 1 tablet by mouth daily. Tablet       multivitamin capsule [MULTIVITAMIN CAPSULE] Take 1 capsule by mouth daily.       simvastatin (ZOCOR) 10 MG tablet Take 1 tablet (10 mg) by mouth daily 90 tablet 3     Allergies   Allergen Reactions     Bactrim [Sulfamethoxazole W/Trimethoprim] Dizziness     Sulfamethoxazole-Trimethoprim Dizziness     Pneumonia Vaccine:For adults 65 years or older who do not have an immunocompromising condition, cerebrospinal fluid leak, or cochlear implant and want to receive PCV13 AND  "PPSV23: Administer 1 dose of PCV13 first then give 1 dose of PPSV23 at least 1 year later. If the patient already received PPSV23, give the dose of PCV13 at least 1 year after they received the most recent dose of PPSV23. Anyone who received any doses of PPSV23 before age 65 should receive 1 final dose of the vaccine at age 65 or older. Administer this last dose at least 5 years after the prior PPSV23 dose.  Mammogram Screening: Mammogram Screening - Patient over age 75, has elected to continue with screening.      Pertinent mammograms are reviewed under the imaging tab.    Review of Systems   Constitutional: Negative for chills and fever.   HENT: Positive for hearing loss. Negative for congestion, ear pain and sore throat.    Eyes: Negative for pain and visual disturbance.   Respiratory: Negative for cough and shortness of breath.    Cardiovascular: Positive for peripheral edema. Negative for chest pain and palpitations.   Gastrointestinal: Positive for constipation. Negative for abdominal pain, diarrhea, heartburn, hematochezia and nausea.   Breasts:  Negative for tenderness, breast mass and discharge.   Genitourinary: Positive for frequency, pelvic pain and urgency. Negative for dysuria, genital sores, hematuria, vaginal bleeding and vaginal discharge.   Musculoskeletal: Negative for arthralgias, joint swelling and myalgias.   Skin: Negative for rash.   Neurological: Positive for headaches. Negative for dizziness, weakness and paresthesias.   Psychiatric/Behavioral: Negative for mood changes. The patient is not nervous/anxious.          OBJECTIVE:   /76 (BP Location: Left arm, Patient Position: Sitting, Cuff Size: Adult Regular)   Pulse 64   Temp 98  F (36.7  C) (Oral)   Resp 20   Ht 1.664 m (5' 5.5\")   Wt 69.9 kg (154 lb 2 oz)   BMI 25.26 kg/m   Estimated body mass index is 25.26 kg/m  as calculated from the following:    Height as of this encounter: 1.664 m (5' 5.5\").    Weight as of this encounter: " "69.9 kg (154 lb 2 oz).  Physical Exam  GENERAL APPEARANCE: healthy, alert and no distress  EYES: Eyes grossly normal to inspection, PERRL and conjunctivae and sclerae normal  HENT: ear canals and TM's normal, nose and mouth without ulcers or lesions, oropharynx clear and oral mucous membranes moist  NECK: no adenopathy, no asymmetry, masses, or scars and thyroid normal to palpation  RESP: lungs clear to auscultation - no rales, rhonchi or wheezes  BREAST: normal without masses, tenderness or nipple discharge and no palpable axillary masses or adenopathy  CV: regular rate and rhythm, normal S1 S2, no S3 or S4, no murmur, click or rub, no peripheral edema and peripheral pulses strong  ABDOMEN: soft, nontender, no hepatosplenomegaly, no masses and bowel sounds normal   (female): normal female external genitalia, normal urethral meatus  MS: no musculoskeletal defects are noted and gait is age appropriate without ataxia  SKIN: no suspicious lesions or rashes  NEURO: Normal strength and tone, sensory exam grossly normal, mentation intact and speech normal  PSYCH: mentation appears normal and affect normal/bright    Labs reviewed in Epic    ASSESSMENT / PLAN:     Problem List Items Addressed This Visit    None     Visit Diagnoses     Encounter for Medicare annual wellness exam    -  Primary              COUNSELING:  Reviewed preventive health counseling, as reflected in patient instructions       Regular exercise       Healthy diet/nutrition       Hearing screening       Osteoporosis prevention/bone health       Colon cancer screening    Estimated body mass index is 25.26 kg/m  as calculated from the following:    Height as of this encounter: 1.664 m (5' 5.5\").    Weight as of this encounter: 69.9 kg (154 lb 2 oz).        She reports that she has never smoked. She has never used smokeless tobacco.      Appropriate preventive services were discussed with this patient, including applicable screening as appropriate for " cardiovascular disease, diabetes, osteopenia/osteoporosis, and glaucoma.  As appropriate for age/gender, discussed screening for colorectal cancer, prostate cancer, breast cancer, and cervical cancer. Checklist reviewing preventive services available has been given to the patient.    Reviewed patients plan of care and provided an AVS. The Basic Care Plan (routine screening as documented in Health Maintenance) for Kalpana meets the Care Plan requirement. This Care Plan has been established and reviewed with the Patient.    Counseling Resources:  ATP IV Guidelines  Pooled Cohorts Equation Calculator  Breast Cancer Risk Calculator  Breast Cancer: Medication to Reduce Risk  FRAX Risk Assessment  ICSI Preventive Guidelines  Dietary Guidelines for Americans, 2010  USDA's MyPlate  ASA Prophylaxis  Lung CA Screening    TANIKA CLAYTONGrand Itasca Clinic and Hospital    Identified Health Risks:

## 2022-10-01 ENCOUNTER — HEALTH MAINTENANCE LETTER (OUTPATIENT)
Age: 82
End: 2022-10-01

## 2022-12-14 ENCOUNTER — TRANSFERRED RECORDS (OUTPATIENT)
Dept: HEALTH INFORMATION MANAGEMENT | Facility: CLINIC | Age: 82
End: 2022-12-14

## 2022-12-14 LAB — EJECTION FRACTION: NORMAL %

## 2023-04-27 DIAGNOSIS — E78.5 HYPERLIPIDEMIA, UNSPECIFIED HYPERLIPIDEMIA TYPE: ICD-10-CM

## 2023-04-27 RX ORDER — SIMVASTATIN 10 MG
10 TABLET ORAL DAILY
Qty: 90 TABLET | Refills: 1 | Status: SHIPPED | OUTPATIENT
Start: 2023-04-27

## 2023-04-27 NOTE — TELEPHONE ENCOUNTER
"Routing refill request to provider for review/approval because:  Labs not current:  No LDL in last year    Last Written Prescription Date:  5/2/22  Last Fill Quantity: 90,  # refills: 3   Last office visit provider:  8/31/22     Requested Prescriptions   Pending Prescriptions Disp Refills     simvastatin (ZOCOR) 10 MG tablet [Pharmacy Med Name: SIMVASTATIN 10 MG TABLET] 90 tablet 1     Sig: TAKE 1 TABLET (10 MG) BY MOUTH DAILY.       Statins Protocol Failed - 4/27/2023  4:45 AM        Failed - LDL on file in past 12 months     Recent Labs   Lab Test 08/30/21  1010   LDL 86             Passed - No abnormal creatine kinase in past 12 months     No lab results found.             Passed - Recent (12 mo) or future (30 days) visit within the authorizing provider's specialty     Patient has had an office visit with the authorizing provider or a provider within the authorizing providers department within the previous 12 mos or has a future within next 30 days. See \"Patient Info\" tab in inbasket, or \"Choose Columns\" in Meds & Orders section of the refill encounter.              Passed - Medication is active on med list        Passed - Patient is age 18 or older        Passed - No active pregnancy on record        Passed - No positive pregnancy test in past 12 months             VESTA AMES RN 04/27/23 2:29 PM  "

## 2023-05-17 ENCOUNTER — TRANSFERRED RECORDS (OUTPATIENT)
Dept: HEALTH INFORMATION MANAGEMENT | Facility: CLINIC | Age: 83
End: 2023-05-17
Payer: COMMERCIAL

## 2023-05-22 ENCOUNTER — TELEPHONE (OUTPATIENT)
Dept: FAMILY MEDICINE | Facility: CLINIC | Age: 83
End: 2023-05-22

## 2023-05-22 ENCOUNTER — OFFICE VISIT (OUTPATIENT)
Dept: FAMILY MEDICINE | Facility: CLINIC | Age: 83
End: 2023-05-22
Payer: COMMERCIAL

## 2023-05-22 VITALS
DIASTOLIC BLOOD PRESSURE: 83 MMHG | HEART RATE: 72 BPM | WEIGHT: 158 LBS | SYSTOLIC BLOOD PRESSURE: 146 MMHG | BODY MASS INDEX: 25.89 KG/M2 | OXYGEN SATURATION: 96 %

## 2023-05-22 DIAGNOSIS — M94.9 DISORDER OF BONE AND CARTILAGE: ICD-10-CM

## 2023-05-22 DIAGNOSIS — I10 BENIGN ESSENTIAL HYPERTENSION: ICD-10-CM

## 2023-05-22 DIAGNOSIS — R73.03 PREDIABETES: ICD-10-CM

## 2023-05-22 DIAGNOSIS — N32.81 OVERACTIVE BLADDER: ICD-10-CM

## 2023-05-22 DIAGNOSIS — N18.31 CHRONIC KIDNEY DISEASE, STAGE 3A (H): ICD-10-CM

## 2023-05-22 DIAGNOSIS — N30.01 ACUTE CYSTITIS WITH HEMATURIA: ICD-10-CM

## 2023-05-22 DIAGNOSIS — N32.81 OVERACTIVE BLADDER: Primary | ICD-10-CM

## 2023-05-22 DIAGNOSIS — M89.9 DISORDER OF BONE AND CARTILAGE: ICD-10-CM

## 2023-05-22 DIAGNOSIS — E78.5 HYPERLIPIDEMIA, UNSPECIFIED HYPERLIPIDEMIA TYPE: ICD-10-CM

## 2023-05-22 DIAGNOSIS — Z78.0 POSTMENOPAUSAL STATUS: ICD-10-CM

## 2023-05-22 LAB
ALBUMIN SERPL BCG-MCNC: 4.3 G/DL (ref 3.5–5.2)
ALBUMIN UR-MCNC: ABNORMAL MG/DL
ALP SERPL-CCNC: 53 U/L (ref 35–104)
ALT SERPL W P-5'-P-CCNC: 12 U/L (ref 10–35)
ANION GAP SERPL CALCULATED.3IONS-SCNC: 10 MMOL/L (ref 7–15)
APPEARANCE UR: ABNORMAL
AST SERPL W P-5'-P-CCNC: 21 U/L (ref 10–35)
BACTERIA #/AREA URNS HPF: ABNORMAL /HPF
BILIRUB SERPL-MCNC: 0.5 MG/DL
BILIRUB UR QL STRIP: NEGATIVE
BUN SERPL-MCNC: 21 MG/DL (ref 8–23)
CALCIUM SERPL-MCNC: 9.3 MG/DL (ref 8.8–10.2)
CHLORIDE SERPL-SCNC: 108 MMOL/L (ref 98–107)
CHOLEST SERPL-MCNC: 141 MG/DL
COLOR UR AUTO: YELLOW
CREAT SERPL-MCNC: 0.92 MG/DL (ref 0.51–0.95)
DEPRECATED CALCIDIOL+CALCIFEROL SERPL-MC: 46 UG/L (ref 20–75)
DEPRECATED HCO3 PLAS-SCNC: 26 MMOL/L (ref 22–29)
GFR SERPL CREATININE-BSD FRML MDRD: 61 ML/MIN/1.73M2
GLUCOSE SERPL-MCNC: 86 MG/DL (ref 70–99)
GLUCOSE UR STRIP-MCNC: NEGATIVE MG/DL
HBA1C MFR BLD: 5.5 % (ref 0–5.6)
HDLC SERPL-MCNC: 57 MG/DL
HGB UR QL STRIP: ABNORMAL
KETONES UR STRIP-MCNC: NEGATIVE MG/DL
LDLC SERPL CALC-MCNC: 74 MG/DL
LEUKOCYTE ESTERASE UR QL STRIP: ABNORMAL
NITRATE UR QL: NEGATIVE
NONHDLC SERPL-MCNC: 84 MG/DL
PH UR STRIP: 6.5 [PH] (ref 5–8)
POTASSIUM SERPL-SCNC: 4 MMOL/L (ref 3.4–5.3)
PROT SERPL-MCNC: 6.4 G/DL (ref 6.4–8.3)
RBC #/AREA URNS AUTO: ABNORMAL /HPF
SODIUM SERPL-SCNC: 144 MMOL/L (ref 136–145)
SP GR UR STRIP: 1.01 (ref 1–1.03)
SQUAMOUS #/AREA URNS AUTO: ABNORMAL /LPF
TRIGL SERPL-MCNC: 50 MG/DL
UROBILINOGEN UR STRIP-ACNC: 0.2 E.U./DL
WBC #/AREA URNS AUTO: ABNORMAL /HPF
WBC CLUMPS #/AREA URNS HPF: PRESENT /HPF

## 2023-05-22 PROCEDURE — 99214 OFFICE O/P EST MOD 30 MIN: CPT | Performed by: FAMILY MEDICINE

## 2023-05-22 PROCEDURE — 87086 URINE CULTURE/COLONY COUNT: CPT | Performed by: FAMILY MEDICINE

## 2023-05-22 PROCEDURE — 81001 URINALYSIS AUTO W/SCOPE: CPT | Performed by: FAMILY MEDICINE

## 2023-05-22 PROCEDURE — 83036 HEMOGLOBIN GLYCOSYLATED A1C: CPT | Performed by: FAMILY MEDICINE

## 2023-05-22 PROCEDURE — 80061 LIPID PANEL: CPT | Performed by: FAMILY MEDICINE

## 2023-05-22 PROCEDURE — 36415 COLL VENOUS BLD VENIPUNCTURE: CPT | Performed by: FAMILY MEDICINE

## 2023-05-22 PROCEDURE — 80053 COMPREHEN METABOLIC PANEL: CPT | Performed by: FAMILY MEDICINE

## 2023-05-22 PROCEDURE — 82306 VITAMIN D 25 HYDROXY: CPT | Performed by: FAMILY MEDICINE

## 2023-05-22 RX ORDER — LISINOPRIL 10 MG/1
10 TABLET ORAL DAILY
Qty: 30 TABLET | Refills: 1 | Status: SHIPPED | OUTPATIENT
Start: 2023-05-22 | End: 2023-06-22

## 2023-05-22 RX ORDER — TOLTERODINE 4 MG/1
4 CAPSULE, EXTENDED RELEASE ORAL DAILY
Qty: 30 CAPSULE | Refills: 1 | Status: SHIPPED | OUTPATIENT
Start: 2023-05-22 | End: 2023-06-22

## 2023-05-22 RX ORDER — NITROFURANTOIN 25; 75 MG/1; MG/1
100 CAPSULE ORAL 2 TIMES DAILY
Qty: 10 CAPSULE | Refills: 0 | Status: SHIPPED | OUTPATIENT
Start: 2023-05-22 | End: 2023-05-27

## 2023-05-22 NOTE — TELEPHONE ENCOUNTER
----- Message from Mela López MD sent at 5/22/2023 11:35 AM CDT -----  Please call patient and let her know that her urinalysis actually did show signs of an infection.  I am going to recommend that she hold off on filling the prescription for Detrol at this time and take a course of antibiotics that I just sent in for a UTI.  Ideally, I would like for her to have her urine rechecked in 2 weeks to make sure that the blood clears as well as the infection.  If she continues to have overactive bladder symptoms after we confirm clearing of the infection, then starting Detrol would make sense.

## 2023-05-22 NOTE — TELEPHONE ENCOUNTER
Left message to call back for: pt  Information to relay to patient: see providers message below and relay

## 2023-05-22 NOTE — PROGRESS NOTES
"  Assessment & Plan   Problem List Items Addressed This Visit        Endocrine    Hyperlipidemia     Patient continues on simvastatin 10 mg daily.  Updated lipid panel done today.         Relevant Orders    Comprehensive metabolic panel (BMP + Alb, Alk Phos, ALT, AST, Total. Bili, TP)    Lipid Profile (Chol, Trig, HDL, LDL calc)    Prediabetes     Checked an updated A1c today.         Relevant Orders    Hemoglobin A1c (Completed)       Circulatory    Benign essential hypertension     The patient's blood pressure has been borderline or elevated consistently over the past few months.  I recommended starting lisinopril 10 mg daily with a blood pressure follow-up visit in 1 month.         Relevant Medications    lisinopril (ZESTRIL) 10 MG tablet       Musculoskeletal and Integumentary    Osteopenia     The patient is due for an updated DEXA scan this fall and I also recommended discontinuing alendronate as she will have been on the medication now for 5 years.  I checked an updated vitamin D level.         Relevant Orders    DX Hip/Pelvis/Spine    Vitamin D Deficiency       Urinary    Overactive bladder - Primary     The patient has been doing Kegel exercises with some improvement but continues to have frequent urination sometimes awakening 5 times during the night.  I recommended starting Detrol LA 4 mg daily at today's visit.         Relevant Medications    tolterodine ER (DETROL LA) 4 MG 24 hr capsule    Other Relevant Orders    UA Macroscopic with reflex to Microscopic and Culture (Completed)    Urine Microscopic Exam (Completed)    Urine Culture    Chronic kidney disease, stage 3a (H)   Other Visit Diagnoses     Postmenopausal status        Relevant Orders    DX Hip/Pelvis/Spine         0956}     BMI:   Estimated body mass index is 25.89 kg/m  as calculated from the following:    Height as of 8/31/22: 1.664 m (5' 5.5\").    Weight as of this encounter: 71.7 kg (158 lb).       TANIKA YU MD  [facility] CenterLAURIE Acuna is a 83 year old who presents today for a routine 6-month follow-up visit.  The patient is overall doing reasonably well.  Her main complaint is that of frequent urination sometimes up to 5 times during the night.  She tried Kegel exercises which seem to help some but continued to be a problem.  The patient was seen by her 's cardiologist this winter and had an echocardiogram stress test which came back normal.  She reports that since coming home she has been noticing some slight shortness of breath with climbing stairs.  She denies any exertional chest discomfort with that.  She also states that she did not have any stairs to climb when she was in [place].  She is otherwise doing well and tolerating all her medication just fine.    6 month follow up        5/22/2023     8:35 AM   Additional Questions   Roomed by as   Accompanied by self         5/22/2023     8:35 AM   Patient Reported Additional Medications   Patient reports taking the following new medications no           Objective    BP (!) 146/83 (BP Location: Left arm, Patient Position: Left side, Cuff Size: Adult Large)   Pulse 72   Wt 71.7 kg (158 lb)   SpO2 96%   BMI 25.89 kg/m    Body mass index is 25.89 kg/m .  Physical Exam   GENERAL: healthy, alert and no distress  RESP: lungs clear to auscultation - no rales, rhonchi or wheezes  CV: regular rate and rhythm, normal S1 S2, no S3 or S4, no murmur, click or rub, no peripheral edema and peripheral pulses strong                Answers for HPI/ROS submitted by the patient on 5/22/2023  What is the reason for your visit today? : several concerns  How many servings of fruits and vegetables do you eat daily?: 2-3  On average, how many sweetened beverages do you drink each day (Examples: soda, juice, sweet tea, etc.  Do NOT count diet or artificially sweetened beverages)?: 1  How many minutes a day do you exercise enough to make your heart beat faster?: 10 to  19  How many days a week do you exercise enough to make your heart beat faster?: 3 or less  How many days per week do you miss taking your medication?: 0

## 2023-05-22 NOTE — ASSESSMENT & PLAN NOTE
The patient has been doing Kegel exercises with some improvement but continues to have frequent urination sometimes awakening 5 times during the night.  I recommended starting Detrol LA 4 mg daily at today's visit.

## 2023-05-22 NOTE — ASSESSMENT & PLAN NOTE
The patient's blood pressure has been borderline or elevated consistently over the past few months.  I recommended starting lisinopril 10 mg daily with a blood pressure follow-up visit in 1 month.

## 2023-05-22 NOTE — TELEPHONE ENCOUNTER
General Call      Reason for Call:  Returning Jamee's call.    What are your questions or concerns:    Patient states that she is returning  nurse, Jamee's call. She said that she could hear Jamee on the phone but Jamee could not hear her, no notes in chart that I could find to pass along to the patient instead.    Jamee is currently rooming a patient and is unable to take a call, sending encounter for return call when she is available.    Date of last appointment with provider: 05/22/23    Could we send this information to you in [employer] or would you prefer to receive a phone call?:   Patient would prefer a phone call     Okay to leave a detailed message?: No at Cell number on file:    Telephone Information:   Mobile [phone]

## 2023-05-22 NOTE — ASSESSMENT & PLAN NOTE
The patient is due for an updated DEXA scan this fall and I also recommended discontinuing alendronate as she will have been on the medication now for 5 years.  I checked an updated vitamin D level.

## 2023-05-23 ENCOUNTER — TRANSFERRED RECORDS (OUTPATIENT)
Dept: HEALTH INFORMATION MANAGEMENT | Facility: CLINIC | Age: 83
End: 2023-05-23
Payer: COMMERCIAL

## 2023-05-23 RX ORDER — TOLTERODINE 4 MG/1
CAPSULE, EXTENDED RELEASE ORAL
Qty: 90 CAPSULE | OUTPATIENT
Start: 2023-05-23

## 2023-05-23 RX ORDER — LISINOPRIL 10 MG/1
TABLET ORAL
Qty: 90 TABLET | OUTPATIENT
Start: 2023-05-23

## 2023-05-24 LAB — BACTERIA UR CULT: NORMAL

## 2023-05-30 ENCOUNTER — TELEPHONE (OUTPATIENT)
Dept: FAMILY MEDICINE | Facility: CLINIC | Age: 83
End: 2023-05-30
Payer: COMMERCIAL

## 2023-05-30 DIAGNOSIS — N32.81 OVERACTIVE BLADDER: Primary | ICD-10-CM

## 2023-05-30 NOTE — TELEPHONE ENCOUNTER
"Patient calling into request a UA lab appointment.  Appointment scheduled on 6/5/23    Noted repeat UA suggested by Dr López on 5/22/23 lab results. \"I would like for her to have her urine rechecked in 2 weeks to make sure that the blood clears as well as the infection.\"    Lab order pended for review and if agree, approval.    "

## 2023-06-05 ENCOUNTER — LAB (OUTPATIENT)
Dept: LAB | Facility: CLINIC | Age: 83
End: 2023-06-05
Payer: COMMERCIAL

## 2023-06-05 ENCOUNTER — TELEPHONE (OUTPATIENT)
Dept: FAMILY MEDICINE | Facility: CLINIC | Age: 83
End: 2023-06-05

## 2023-06-05 DIAGNOSIS — N32.81 OVERACTIVE BLADDER: ICD-10-CM

## 2023-06-05 LAB
ALBUMIN UR-MCNC: 30 MG/DL
APPEARANCE UR: ABNORMAL
BACTERIA #/AREA URNS HPF: ABNORMAL /HPF
BILIRUB UR QL STRIP: NEGATIVE
COLOR UR AUTO: YELLOW
GLUCOSE UR STRIP-MCNC: NEGATIVE MG/DL
HGB UR QL STRIP: ABNORMAL
KETONES UR STRIP-MCNC: NEGATIVE MG/DL
LEUKOCYTE ESTERASE UR QL STRIP: ABNORMAL
NITRATE UR QL: NEGATIVE
PH UR STRIP: 6 [PH] (ref 5–8)
RBC #/AREA URNS AUTO: ABNORMAL /HPF
RENAL EPI CELLS #/AREA URNS HPF: ABNORMAL /HPF
SP GR UR STRIP: 1.02 (ref 1–1.03)
SQUAMOUS #/AREA URNS AUTO: ABNORMAL /LPF
UROBILINOGEN UR STRIP-ACNC: 0.2 E.U./DL
WBC #/AREA URNS AUTO: >100 /HPF
WBC CLUMPS #/AREA URNS HPF: PRESENT /HPF

## 2023-06-05 PROCEDURE — 87086 URINE CULTURE/COLONY COUNT: CPT

## 2023-06-05 PROCEDURE — 81001 URINALYSIS AUTO W/SCOPE: CPT

## 2023-06-05 RX ORDER — CIPROFLOXACIN 250 MG/1
250 TABLET, FILM COATED ORAL 2 TIMES DAILY
Qty: 10 TABLET | Refills: 0 | Status: SHIPPED | OUTPATIENT
Start: 2023-06-05 | End: 2023-06-10

## 2023-06-05 NOTE — TELEPHONE ENCOUNTER
----- Message from Mela López MD sent at 6/5/2023 12:54 PM CDT -----  Please call patient and let her know that it looks to me like she has a UTI.  I just sent in a prescription for new antibiotic to take twice daily for 5 days.  I will get back to her regarding the urine culture result.

## 2023-06-05 NOTE — TELEPHONE ENCOUNTER
Left message for:  Resutls  Information to Relay:  Please see below notes and relay upon return call

## 2023-06-07 LAB — BACTERIA UR CULT: NORMAL

## 2023-06-22 ENCOUNTER — E-CONSULT (OUTPATIENT)
Dept: UROLOGY | Facility: CLINIC | Age: 83
End: 2023-06-22

## 2023-06-22 ENCOUNTER — OFFICE VISIT (OUTPATIENT)
Dept: FAMILY MEDICINE | Facility: CLINIC | Age: 83
End: 2023-06-22
Payer: COMMERCIAL

## 2023-06-22 VITALS
BODY MASS INDEX: 25.3 KG/M2 | OXYGEN SATURATION: 99 % | DIASTOLIC BLOOD PRESSURE: 79 MMHG | HEART RATE: 62 BPM | SYSTOLIC BLOOD PRESSURE: 134 MMHG | WEIGHT: 154.4 LBS

## 2023-06-22 DIAGNOSIS — I10 BENIGN ESSENTIAL HYPERTENSION: ICD-10-CM

## 2023-06-22 DIAGNOSIS — R82.81 PYURIA: ICD-10-CM

## 2023-06-22 DIAGNOSIS — M94.9 DISORDER OF BONE AND CARTILAGE: ICD-10-CM

## 2023-06-22 DIAGNOSIS — I10 BENIGN ESSENTIAL HYPERTENSION: Primary | ICD-10-CM

## 2023-06-22 DIAGNOSIS — M89.9 DISORDER OF BONE AND CARTILAGE: ICD-10-CM

## 2023-06-22 DIAGNOSIS — N32.81 OVERACTIVE BLADDER: ICD-10-CM

## 2023-06-22 DIAGNOSIS — R35.1 NOCTURIA: Primary | ICD-10-CM

## 2023-06-22 DIAGNOSIS — R82.998 WHITE BLOOD CELLS PRESENT ON URINE MICROSCOPY: ICD-10-CM

## 2023-06-22 LAB
ALBUMIN UR-MCNC: ABNORMAL MG/DL
APPEARANCE UR: CLEAR
BACTERIA #/AREA URNS HPF: ABNORMAL /HPF
BILIRUB UR QL STRIP: NEGATIVE
COLOR UR AUTO: YELLOW
GLUCOSE UR STRIP-MCNC: NEGATIVE MG/DL
HGB UR QL STRIP: ABNORMAL
KETONES UR STRIP-MCNC: NEGATIVE MG/DL
LEUKOCYTE ESTERASE UR QL STRIP: ABNORMAL
NITRATE UR QL: NEGATIVE
PH UR STRIP: 6.5 [PH] (ref 5–8)
RBC #/AREA URNS AUTO: ABNORMAL /HPF
SP GR UR STRIP: 1.02 (ref 1–1.03)
UROBILINOGEN UR STRIP-ACNC: 0.2 E.U./DL
WBC #/AREA URNS AUTO: ABNORMAL /HPF

## 2023-06-22 PROCEDURE — 87186 SC STD MICRODIL/AGAR DIL: CPT | Performed by: FAMILY MEDICINE

## 2023-06-22 PROCEDURE — 99214 OFFICE O/P EST MOD 30 MIN: CPT | Performed by: FAMILY MEDICINE

## 2023-06-22 PROCEDURE — 99207 E-CONSULT TO UROLOGY (ADULT OUTPT PROVIDER TO SPECIALIST WRITTEN QUESTION & RESPONSE): CPT | Performed by: FAMILY MEDICINE

## 2023-06-22 PROCEDURE — 99451 NTRPROF PH1/NTRNET/EHR 5/>: CPT | Performed by: STUDENT IN AN ORGANIZED HEALTH CARE EDUCATION/TRAINING PROGRAM

## 2023-06-22 PROCEDURE — 81001 URINALYSIS AUTO W/SCOPE: CPT | Performed by: FAMILY MEDICINE

## 2023-06-22 PROCEDURE — 87086 URINE CULTURE/COLONY COUNT: CPT | Performed by: FAMILY MEDICINE

## 2023-06-22 PROCEDURE — 87088 URINE BACTERIA CULTURE: CPT | Performed by: FAMILY MEDICINE

## 2023-06-22 RX ORDER — LISINOPRIL/HYDROCHLOROTHIAZIDE 10-12.5 MG
1 TABLET ORAL DAILY
Qty: 30 TABLET | Refills: 1 | Status: SHIPPED | OUTPATIENT
Start: 2023-06-22 | End: 2023-08-03

## 2023-06-22 RX ORDER — LISINOPRIL/HYDROCHLOROTHIAZIDE 10-12.5 MG
1 TABLET ORAL DAILY
Qty: 90 TABLET | OUTPATIENT
Start: 2023-06-22

## 2023-06-22 RX ORDER — ALENDRONATE SODIUM 70 MG/1
70 TABLET ORAL
Qty: 12 TABLET | Refills: 0 | Status: SHIPPED | OUTPATIENT
Start: 2023-06-22 | End: 2024-05-20

## 2023-06-22 NOTE — PROGRESS NOTES
"  Assessment & Plan   Problem List Items Addressed This Visit        Circulatory    Benign essential hypertension - Primary     I added hydrochlorothiazide 12.5 mg to her lisinopril 10 mg and a combination tablet and asked her to follow-up in 1 month for blood pressure recheck.         Relevant Medications    lisinopril-hydrochlorothiazide (ZESTORETIC) 10-12.5 MG tablet       Musculoskeletal and Integumentary    Osteopenia     Refill was provided today for her alendronate 70 mg weekly.         Relevant Medications    alendronate (FOSAMAX) 70 MG tablet       Urinary    Overactive bladder     The patient has had 2 urine cultures which have come back showing a mixture of urogenital lloyd and no predominant organism.  However, her macro and micro findings are suspicious for an infection.  I reached out via an E consult for consultation with a urologist.  Await further recommendations but a repeat urinalysis was collected today.         Relevant Orders    Adult E-Consult to Urology (Outpt Provider to Specialist Written Question & Response)   Other Visit Diagnoses     White blood cells present on urine microscopy        Relevant Orders    Adult E-Consult to Urology (Outpt Provider to Specialist Written Question & Response)                BMI:   Estimated body mass index is 25.3 kg/m  as calculated from the following:    Height as of 8/31/22: 1.664 m (5' 5.5\").    Weight as of this encounter: 70 kg (154 lb 6.4 oz).       TANIKA YU MD  [facility]    Rowena Acuna is a 83 year old who presents today for a blood pressure follow-up.  The patient was started on lisinopril 10 mg daily about a month ago with a new diagnosis of hypertension.  The patient is tolerating the new medication well without any side effects.  She has been checking her blood pressures at home and states that the systolic readings have been typically in the 130s.  In addition, the patient has a concern regarding " overactive bladder.  We discussed this previously and I recommended ruling out infection as a potential contributing factor.  Her macro and micro findings on urinalysis were highly suggestive of an infection twice now and she was empirically treated with 2 different antibiotics.  However, culture results both times came back showing a mixture of urogenital lloyd.  The patient continues to experience frequent urination in particular at night where she is often waking up 5 times a night.  She denies burning with urination but does sometimes have urgency.    Blood Pressure Check        5/22/2023     8:35 AM   Additional Questions   Roomed by as   Accompanied by self     History of Present Illness       Reason for visit:  Follow up    She eats 0-1 servings of fruits and vegetables daily.She consumes 0 sweetened beverage(s) daily.She exercises with enough effort to increase her heart rate 10 to 19 minutes per day.  She exercises with enough effort to increase her heart rate 3 or less days per week.   She is taking medications regularly.           Objective    /74 (BP Location: Left arm, Patient Position: Left side, Cuff Size: Adult Large)   Pulse 62   Wt 70 kg (154 lb 6.4 oz)   SpO2 99%   BMI 25.30 kg/m    Body mass index is 25.3 kg/m .  Physical Exam   GENERAL: healthy, alert and no distress

## 2023-06-23 ENCOUNTER — TELEPHONE (OUTPATIENT)
Dept: FAMILY MEDICINE | Facility: CLINIC | Age: 83
End: 2023-06-23
Payer: COMMERCIAL

## 2023-06-23 DIAGNOSIS — N39.0 URINARY TRACT INFECTION WITHOUT HEMATURIA, SITE UNSPECIFIED: Primary | ICD-10-CM

## 2023-06-23 RX ORDER — GRANULES FOR ORAL 3 G/1
3 POWDER ORAL ONCE
Qty: 1 PACKET | Refills: 0 | Status: SHIPPED | OUTPATIENT
Start: 2023-06-23 | End: 2023-06-23

## 2023-06-23 NOTE — TELEPHONE ENCOUNTER
Prior Authorization Request  Who s requesting:  Pharmacy  [id number] [place] [address]   Medication Name: fosfomycin (MONUROL) 3 g Packet  Insurance Plan: BCBS MEDICARE ADVANTAGE  Insurance Member ID Number:  [id number]  CoverMyMeds Key: [id number]  Informed patient that prior authorizations can take up to 10 business days for response:   NA  Okay to leave a detailed message: No     Route to pool:  [facility]

## 2023-06-23 NOTE — PROGRESS NOTES
E consult recommendations reviewed.  Urology recommending speciation and sensitivity on recent urine culture.    Urology also recommending empiric antibiotics.  The patient has allergies to Bactrim.  She was treated empirically with nitrofurantoin on 5/22.  She was treated empirically with ciprofloxacin on 6/5.    No culture to guide therapy.  Trial of fosfomycin x1 sent to pharmacy.

## 2023-06-23 NOTE — TELEPHONE ENCOUNTER
Central Prior Authorization Team   Phone: [phone]    PA Initiation    Medication: fosfomycin (MONUROL) 3 g Packet  Insurance Company: [facility] - Phone [phone] Fax [phone]  Pharmacy Filling the Rx: [facility] #[id number] [place], [place] - [address] [facility] [address] [place]  Filling Pharmacy Phone: [phone]  Filling Pharmacy Fax:    Start Date: 6/23/2023

## 2023-06-23 NOTE — PROGRESS NOTES
6/22/2023     E-Consult has been accepted.    Interprofessional consultation requested by:  Mela López MD      Clinical Question/Purpose: MY CLINICAL QUESTION IS: This patient presented with overactive bladder.  Initial urinalysis suggested infection, however, culture came back with mixed urogenital lloyd.  Repeat urinalysis showed the same thing and again mixed urogenital lloyd.  She is in again today and again microscopic urinalysis looks positive.  Can you help me interpret this?  She is confident she provided a clean catch specimen each time and does not have any issues with vaginal discharge.    Patient assessment and information reviewed: prior urinalysis from the past several months as well as urine culture results    Post menopausal woman with bacteruria and pyuria with negative nitrites in setting of lower urinary tract symptoms, I would treat this as a symptomatic urinary tract infection.       Recommendations: Recommend contacting [facility] and asking for speciation and sensitivity on the latest urine culture result from today to make sure that empiric antibiotics are treating any pathogens founds. She should be started on vaginal estrogen three times a week.     Re: lower urinary tract symptoms on tolterodine 4 mg x 1 month. It appears that major complaint is nocturia up to 5x. Anticholinergic not typically helpful for that complaint, more important would be behavioral changes with fluid restriction prior to bedtime, consider sleep study for DAI if not already completed    If continues to have concern for recurrent UTI or bothersome lower urinary tract symptoms she should get formal urology consultation    The recommendations provided in this E-Consult are based on a review of clinical data pertinent to the clinical question presented, without a review of the patient's complete medical record or, the benefit of a comprehensive in-person or virtual patient evaluation. This consultation should not  replace the clinical judgement and evaluation of the provider ordering this E-Consult. Any new clinical issues, or changes in patient status since the filing of this E-Consult will need to be taken into account when assessing these recommendations. Please contact me if you have further questions.    My total time spent reviewing clinical information and formulating assessment was 10 minutes.        Anselmo Tripltet MD

## 2023-06-23 NOTE — TELEPHONE ENCOUNTER
"Last Written Prescription Date:  6/22/23  Last Fill Quantity: 30,  # refills: 1   Last office visit provider:   6/22/23    Requested Prescriptions   Pending Prescriptions Disp Refills     lisinopril-hydrochlorothiazide (ZESTORETIC) 10-12.5 MG tablet [Pharmacy Med Name: LISINOPRIL-HCTZ 10/12.5MG TABLETS] 90 tablet      Sig: TAKE 1 TABLET BY MOUTH DAILY       Diuretics (Including Combos) Protocol Passed - 6/22/2023  6:04 PM        Passed - Blood pressure under 140/90 in past 12 months     BP Readings from Last 3 Encounters:   06/22/23 134/79   05/22/23 (!) 146/83   08/31/22 136/76                 Passed - Recent (12 mo) or future (30 days) visit within the authorizing provider's specialty     Patient has had an office visit with the authorizing provider or a provider within the authorizing providers department within the previous 12 mos or has a future within next 30 days. See \"Patient Info\" tab in inbasket, or \"Choose Columns\" in Meds & Orders section of the refill encounter.              Passed - Medication is active on med list        Passed - Patient is age 18 or older        Passed - No active pregancy on record        Passed - Normal serum creatinine on file in past 12 months     Recent Labs   Lab Test 05/22/23  0934   CR 0.92              Passed - Normal serum potassium on file in past 12 months     Recent Labs   Lab Test 05/22/23  0934   POTASSIUM 4.0                    Passed - Normal serum sodium on file in past 12 months     Recent Labs   Lab Test 05/22/23  0934                 Passed - No positive pregnancy test in past 12 months       ACE Inhibitors (Including Combos) Protocol Passed - 6/22/2023  6:04 PM        Passed - Blood pressure under 140/90 in past 12 months     BP Readings from Last 3 Encounters:   06/22/23 134/79   05/22/23 (!) 146/83   08/31/22 136/76                 Passed - Recent (12 mo) or future (30 days) visit within the authorizing provider's specialty     Patient has had an office " "visit with the authorizing provider or a provider within the authorizing providers department within the previous 12 mos or has a future within next 30 days. See \"Patient Info\" tab in inbasket, or \"Choose Columns\" in Meds & Orders section of the refill encounter.              Passed - Medication is active on med list        Passed - Patient is age 18 or older        Passed - No active pregnancy on record        Passed - Normal serum creatinine on file in past 12 months     Recent Labs   Lab Test 05/22/23  0934   CR 0.92       Ok to refill medication if creatinine is low          Passed - Normal serum potassium on file in past 12 months     Recent Labs   Lab Test 05/22/23  0934   POTASSIUM 4.0             Passed - No positive pregnancy test within past 12 months             Cassidy Mayer RN 06/22/23 9:59 PM  "

## 2023-06-25 NOTE — ASSESSMENT & PLAN NOTE
The patient has had 2 urine cultures which have come back showing a mixture of urogenital lloyd and no predominant organism.  However, her macro and micro findings are suspicious for an infection.  I reached out via an E consult for consultation with a urologist.  Await further recommendations but a repeat urinalysis was collected today.

## 2023-06-25 NOTE — ASSESSMENT & PLAN NOTE
I added hydrochlorothiazide 12.5 mg to her lisinopril 10 mg and a combination tablet and asked her to follow-up in 1 month for blood pressure recheck.

## 2023-06-26 NOTE — TELEPHONE ENCOUNTER
PRIOR AUTHORIZATION DENIED    Medication: fosfomycin (MONUROL) 3 g Packet    Denial Date: 6/26/2023    Denial Rational: Patient needs to try and fail alternatives listed below: trimethoprim-  Allergy was listed in PA submission but insurance wants patient to try trimethoprim without sulfa          Appeal Information: Review the plan's reasons for denial listed above. Please utilize that information to complete letter and provide specific, detailed clinical information/rationale of your patient's health status to address their denial reasons

## 2023-06-27 LAB
BACTERIA UR CULT: ABNORMAL

## 2023-06-27 RX ORDER — TETRACYCLINE HYDROCHLORIDE 500 MG/1
500 CAPSULE ORAL 2 TIMES DAILY
Qty: 14 CAPSULE | Refills: 0 | Status: SHIPPED | OUTPATIENT
Start: 2023-06-27 | End: 2023-07-04

## 2023-06-27 NOTE — TELEPHONE ENCOUNTER
Patient notified of providers message as written below. Patient verbalized understanding.     Question regarding tolterodine rx. On AVS from 6/22 visit, notes state to STOP taking tolterodine but patient states during conversation with Dr. López, she was instructed to continue on this medication as she had not started it yet at time of visit. Patient wondering if PCP can clarify this.

## 2023-06-27 NOTE — TELEPHONE ENCOUNTER
I would suggest holding it for now, considering complicated picture of UTI.     If her frequent urinary symptoms do not resolve after we have treated the UTI completely, then we can consider it as a trial. I apologize for the confusion, but it has been a challenging picture

## 2023-06-27 NOTE — TELEPHONE ENCOUNTER
Patient notified of provider's message as written. Patient verbalized understanding.    Encouraged patient to call the clinic with further questions/concerns.     Al Duran RN  [facility]

## 2023-06-27 NOTE — TELEPHONE ENCOUNTER
Please call patient to let her know 1) the initial antibiotic suggested by the urologist for empiric treatment was not covered by her insurance    2) we got results this time of what bacteria are causing her infection and there are two of them that grew out. They are both sensitive to Tetracycline so I just sent in a Rx for that. She should be careful about sun exposure and this medicine can cause stomach upset.     I would like to recheck a urinalysis one week after treatment to be sure it has improved.

## 2023-07-03 ENCOUNTER — TELEPHONE (OUTPATIENT)
Dept: FAMILY MEDICINE | Facility: CLINIC | Age: 83
End: 2023-07-03
Payer: COMMERCIAL

## 2023-07-03 DIAGNOSIS — N39.0 URINARY TRACT INFECTION WITHOUT HEMATURIA, SITE UNSPECIFIED: Primary | ICD-10-CM

## 2023-07-03 NOTE — TELEPHONE ENCOUNTER
Kalpana states that her urine is much lighter. Kalpana is not getting up as much at night.     Appointment made  For lab only appointment

## 2023-07-03 NOTE — TELEPHONE ENCOUNTER
General Call    Contacts       Type Contact Phone/Fax    07/03/2023 08:13 AM CDT Phone (Incoming) Kalpana Manzo (Self) [phone] (M)        Reason for Call:  Medication question    What are your questions or concerns:  Pt called and stated she was suppose to call and let PCP know when she was finishing up with her medication. Pt states tomorrow she will be done with her tetracycline (ACHROMYCIN/SUMYCIN) 500 MG capsule. Pt is wondering what she needs to do next? Pt was wondering if she needs to come back in to see PCP? Please call Pt and advise.    Date of last appointment with provider: 06/22/2023    Could we send this information to you in [employer] or would you prefer to receive a phone call?:   Patient would prefer a phone call   Okay to leave a detailed message?: Yes at Cell number on file:    Telephone Information:   Mobile [phone]     Pat Moore

## 2023-07-03 NOTE — TELEPHONE ENCOUNTER
Yes, to quick things.  First, I would like her to come in for a urinalysis and an order was placed.  Second, has she noticed any improvement in her nocturnal frequent urinary symptoms?

## 2023-07-11 ENCOUNTER — LAB (OUTPATIENT)
Dept: LAB | Facility: CLINIC | Age: 83
End: 2023-07-11
Payer: COMMERCIAL

## 2023-07-11 DIAGNOSIS — N39.0 URINARY TRACT INFECTION WITHOUT HEMATURIA, SITE UNSPECIFIED: ICD-10-CM

## 2023-07-11 LAB
ALBUMIN UR-MCNC: NEGATIVE MG/DL
APPEARANCE UR: CLEAR
BACTERIA #/AREA URNS HPF: ABNORMAL /HPF
BILIRUB UR QL STRIP: NEGATIVE
COLOR UR AUTO: YELLOW
GLUCOSE UR STRIP-MCNC: NEGATIVE MG/DL
HGB UR QL STRIP: ABNORMAL
KETONES UR STRIP-MCNC: NEGATIVE MG/DL
LEUKOCYTE ESTERASE UR QL STRIP: ABNORMAL
NITRATE UR QL: NEGATIVE
PH UR STRIP: 6 [PH] (ref 5–8)
RBC #/AREA URNS AUTO: ABNORMAL /HPF
SP GR UR STRIP: 1.01 (ref 1–1.03)
SQUAMOUS #/AREA URNS AUTO: ABNORMAL /LPF
UROBILINOGEN UR STRIP-ACNC: 0.2 E.U./DL
WBC #/AREA URNS AUTO: ABNORMAL /HPF

## 2023-07-11 PROCEDURE — 81001 URINALYSIS AUTO W/SCOPE: CPT

## 2023-07-17 DIAGNOSIS — I10 BENIGN ESSENTIAL HYPERTENSION: Primary | ICD-10-CM

## 2023-07-17 DIAGNOSIS — N39.0 URINARY TRACT INFECTION WITHOUT HEMATURIA, SITE UNSPECIFIED: ICD-10-CM

## 2023-07-17 PROCEDURE — 99207 E-CONSULT TO UROLOGY (ADULT OUTPT PROVIDER TO SPECIALIST WRITTEN QUESTION & RESPONSE): CPT | Performed by: FAMILY MEDICINE

## 2023-07-17 RX ORDER — HYDROCHLOROTHIAZIDE 12.5 MG/1
12.5 TABLET ORAL DAILY
Qty: 90 TABLET | Refills: 1 | Status: SHIPPED | OUTPATIENT
Start: 2023-07-17 | End: 2024-01-08

## 2023-07-19 ENCOUNTER — E-CONSULT (OUTPATIENT)
Dept: UROLOGY | Facility: CLINIC | Age: 83
End: 2023-07-19
Payer: COMMERCIAL

## 2023-07-19 ENCOUNTER — MYC MEDICAL ADVICE (OUTPATIENT)
Dept: FAMILY MEDICINE | Facility: CLINIC | Age: 83
End: 2023-07-19

## 2023-07-19 DIAGNOSIS — R82.81 PYURIA: ICD-10-CM

## 2023-07-19 DIAGNOSIS — N32.81 OVERACTIVE BLADDER: Primary | ICD-10-CM

## 2023-07-19 DIAGNOSIS — R35.1 NOCTURIA: Primary | ICD-10-CM

## 2023-07-19 DIAGNOSIS — R35.0 URINARY FREQUENCY: ICD-10-CM

## 2023-07-19 PROCEDURE — 99451 NTRPROF PH1/NTRNET/EHR 5/>: CPT | Performed by: STUDENT IN AN ORGANIZED HEALTH CARE EDUCATION/TRAINING PROGRAM

## 2023-07-19 RX ORDER — TROSPIUM CHLORIDE 20 MG/1
20 TABLET, FILM COATED ORAL
Qty: 60 TABLET | Refills: 1 | Status: SHIPPED | OUTPATIENT
Start: 2023-07-19 | End: 2023-07-20

## 2023-07-19 NOTE — PROGRESS NOTES
"    7/19/2023     E-Consult has been accepted.    Interprofessional consultation requested by:  Mela López MD      Clinical Question/Purpose: MY CLINICAL QUESTION IS: This is a follow-up from a recent E consult.  We did this time get a positive urine culture back and treat.  I then did a follow-up urinalysis and it is still showing 5-10 white blood cells.  The patient is asymptomatic.  Could you look over the last few urine tests and let me know if further follow-up is needed or if she needs to be seen in consultation?    Patient assessment and information reviewed: urinalysis, prior office visit note 6/22/2023    Recommendations:   Relatively small amount of pyuria on recent UA is nonspecific  stop obtaining UA/do not get \"test of cure\" followup UA unless patient symptomatic with dysuria or other concern for infection as opposed to baseline urinary frequency or urgency    Appears that one of her most bothersome complaints is nocturia up to 5x a night, this particular complaint is more of a fluid management issues usually and should be managed by fluid restriction at night (no fluid 2 hours prior to bedtime), consider workup for obstructive sleep apnea. If she is having daytime urgency/frequency (I.e. overactive bladder) recommend trial of  beta3 agonist (mirabegron 25 mg daily) for at least 4-6 weeks, can increase to 50 mg daily if not improving. If beta3 agonist prohibitively expensive can try anticholinergic instead (trospium 20 mg bid my recommended given advanced age)    If persistent bothersome issues will need formal urology consult      The recommendations provided in this E-Consult are based on a review of clinical data pertinent to the clinical question presented, without a review of the patient's complete medical record or, the benefit of a comprehensive in-person or virtual patient evaluation. This consultation should not replace the clinical judgement and evaluation of the provider ordering " this E-Consult. Any new clinical issues, or changes in patient status since the filing of this E-Consult will need to be taken into account when assessing these recommendations. Please contact me if you have further questions.    My total time spent reviewing clinical information and formulating assessment was 10 minutes.      Anselmo Triplett MD

## 2023-08-02 ENCOUNTER — HOSPITAL ENCOUNTER (OUTPATIENT)
Dept: MAMMOGRAPHY | Facility: CLINIC | Age: 83
Discharge: HOME | End: 2023-08-02
Attending: FAMILY MEDICINE | Admitting: FAMILY MEDICINE
Payer: COMMERCIAL

## 2023-08-02 DIAGNOSIS — Z12.31 VISIT FOR SCREENING MAMMOGRAM: ICD-10-CM

## 2023-08-02 PROCEDURE — 77067 SCR MAMMO BI INCL CAD: CPT

## 2023-08-03 ENCOUNTER — OFFICE VISIT (OUTPATIENT)
Dept: FAMILY MEDICINE | Facility: CLINIC | Age: 83
End: 2023-08-03
Payer: COMMERCIAL

## 2023-08-03 VITALS
DIASTOLIC BLOOD PRESSURE: 75 MMHG | SYSTOLIC BLOOD PRESSURE: 122 MMHG | BODY MASS INDEX: 25.24 KG/M2 | WEIGHT: 154 LBS | OXYGEN SATURATION: 98 %

## 2023-08-03 DIAGNOSIS — N18.31 CHRONIC KIDNEY DISEASE, STAGE 3A (H): ICD-10-CM

## 2023-08-03 DIAGNOSIS — I10 BENIGN ESSENTIAL HYPERTENSION: Primary | ICD-10-CM

## 2023-08-03 DIAGNOSIS — N32.81 OVERACTIVE BLADDER: ICD-10-CM

## 2023-08-03 LAB
ANION GAP SERPL CALCULATED.3IONS-SCNC: 10 MMOL/L (ref 7–15)
BUN SERPL-MCNC: 24.8 MG/DL (ref 8–23)
CALCIUM SERPL-MCNC: 8.8 MG/DL (ref 8.8–10.2)
CHLORIDE SERPL-SCNC: 107 MMOL/L (ref 98–107)
CREAT SERPL-MCNC: 1.07 MG/DL (ref 0.51–0.95)
DEPRECATED HCO3 PLAS-SCNC: 26 MMOL/L (ref 22–29)
GFR SERPL CREATININE-BSD FRML MDRD: 51 ML/MIN/1.73M2
GLUCOSE SERPL-MCNC: 101 MG/DL (ref 70–99)
POTASSIUM SERPL-SCNC: 3.7 MMOL/L (ref 3.4–5.3)
SODIUM SERPL-SCNC: 143 MMOL/L (ref 136–145)

## 2023-08-03 PROCEDURE — 36415 COLL VENOUS BLD VENIPUNCTURE: CPT | Performed by: FAMILY MEDICINE

## 2023-08-03 PROCEDURE — 99213 OFFICE O/P EST LOW 20 MIN: CPT | Performed by: FAMILY MEDICINE

## 2023-08-03 PROCEDURE — 80048 BASIC METABOLIC PNL TOTAL CA: CPT | Performed by: FAMILY MEDICINE

## 2023-08-03 NOTE — PROGRESS NOTES
"  Assessment & Plan   Problem List Items Addressed This Visit          Circulatory    Benign essential hypertension - Primary     Blood pressure appears to now be under ideal control on hydrochlorothiazide 12.5 mg daily.         Relevant Orders    Basic metabolic panel  (Ca, Cl, CO2, Creat, Gluc, K, Na, BUN)       Urinary    Overactive bladder     The patient is noting good symptom improvement with the addition of trospium twice daily.         Chronic kidney disease, stage 3a (H)      56}     BMI:   Estimated body mass index is 25.24 kg/m  as calculated from the following:    Height as of 8/31/22: 1.664 m (5' 5.5\").    Weight as of this encounter: 69.9 kg (154 lb).         TANIKA YU MD  [facility] is a 83 year old senting today for follow-up regarding hypertension.  The patient currently is on hydrochlorothiazide 12.5 mg daily and has been monitoring her blood pressure at home.  Her blood pressures have been quite optimal without any low blood pressures as she had when she was on a combination of lisinopril with hydrochlorothiazide.  She tolerates the medication well without any side effects.  She also feels that the trospium has definitely improved her frequency as well as urgency with urination.  Blood Pressure Check      8/3/2023     9:16 AM   Additional Questions   Roomed by as   Accompanied by self         8/3/2023     9:16 AM   Patient Reported Additional Medications   Patient reports taking the following new medications no       History of Present Illness       Hypertension: She presents for follow up of hypertension.  She does check blood pressure  regularly outside of the clinic. Outpatient blood pressures have not been over 140/90. She does not follow a low salt diet.     Reason for visit:  Follow up    She eats 2-3 servings of fruits and vegetables daily.She consumes 0 sweetened beverage(s) daily.She exercises with enough effort to increase her heart " rate 10 to 19 minutes per day.  She exercises with enough effort to increase her heart rate 3 or less days per week.   She is taking medications regularly.           Objective    /75 (BP Location: Left arm, Patient Position: Left side, Cuff Size: Adult Large)   Wt 69.9 kg (154 lb)   SpO2 98%   BMI 25.24 kg/m    Body mass index is 25.24 kg/m .  Physical Exam   GENERAL: healthy, alert and no distress

## 2023-08-13 DIAGNOSIS — N32.81 OVERACTIVE BLADDER: ICD-10-CM

## 2023-08-13 RX ORDER — TROSPIUM CHLORIDE 20 MG/1
TABLET, FILM COATED ORAL
Qty: 180 TABLET | Refills: 1 | OUTPATIENT
Start: 2023-08-13

## 2023-08-13 NOTE — TELEPHONE ENCOUNTER
Apparently duplicate request.  Authorized 7/20/23 for #180 (which is already a 90-day supply as is again being requested) with one additional refill, including e-receipt by pharmacy.  No change of pharmacy.  Noted therefore as a  duplicate in refusal transmittal to pharmacy.

## 2023-09-18 ENCOUNTER — OFFICE VISIT (OUTPATIENT)
Dept: FAMILY MEDICINE | Facility: CLINIC | Age: 83
End: 2023-09-18
Payer: COMMERCIAL

## 2023-09-18 VITALS
WEIGHT: 151.4 LBS | RESPIRATION RATE: 16 BRPM | TEMPERATURE: 97.8 F | HEIGHT: 65 IN | BODY MASS INDEX: 25.22 KG/M2 | OXYGEN SATURATION: 97 % | DIASTOLIC BLOOD PRESSURE: 74 MMHG | SYSTOLIC BLOOD PRESSURE: 113 MMHG | HEART RATE: 79 BPM

## 2023-09-18 DIAGNOSIS — R73.03 PREDIABETES: ICD-10-CM

## 2023-09-18 DIAGNOSIS — M94.9 DISORDER OF BONE AND CARTILAGE: ICD-10-CM

## 2023-09-18 DIAGNOSIS — N18.31 CHRONIC KIDNEY DISEASE, STAGE 3A (H): ICD-10-CM

## 2023-09-18 DIAGNOSIS — E78.5 HYPERLIPIDEMIA, UNSPECIFIED HYPERLIPIDEMIA TYPE: ICD-10-CM

## 2023-09-18 DIAGNOSIS — I10 BENIGN ESSENTIAL HYPERTENSION: ICD-10-CM

## 2023-09-18 DIAGNOSIS — M89.9 DISORDER OF BONE AND CARTILAGE: ICD-10-CM

## 2023-09-18 DIAGNOSIS — N32.81 OVERACTIVE BLADDER: ICD-10-CM

## 2023-09-18 DIAGNOSIS — B35.1 DERMATOPHYTOSIS OF NAIL: ICD-10-CM

## 2023-09-18 DIAGNOSIS — Z00.00 ENCOUNTER FOR MEDICARE ANNUAL WELLNESS EXAM: Primary | ICD-10-CM

## 2023-09-18 PROCEDURE — G0008 ADMIN INFLUENZA VIRUS VAC: HCPCS | Performed by: FAMILY MEDICINE

## 2023-09-18 PROCEDURE — 99214 OFFICE O/P EST MOD 30 MIN: CPT | Mod: 25 | Performed by: FAMILY MEDICINE

## 2023-09-18 PROCEDURE — 90662 IIV NO PRSV INCREASED AG IM: CPT | Performed by: FAMILY MEDICINE

## 2023-09-18 PROCEDURE — 80069 RENAL FUNCTION PANEL: CPT | Performed by: FAMILY MEDICINE

## 2023-09-18 PROCEDURE — G0439 PPPS, SUBSEQ VISIT: HCPCS | Performed by: FAMILY MEDICINE

## 2023-09-18 PROCEDURE — 36415 COLL VENOUS BLD VENIPUNCTURE: CPT | Performed by: FAMILY MEDICINE

## 2023-09-18 RX ORDER — TROSPIUM CHLORIDE 20 MG/1
TABLET, FILM COATED ORAL
Qty: 180 TABLET | Refills: 3 | Status: SHIPPED | OUTPATIENT
Start: 2023-09-18 | End: 2024-05-20

## 2023-09-18 ASSESSMENT — ENCOUNTER SYMPTOMS
HEMATURIA: 0
CONSTIPATION: 0
DIARRHEA: 0
COUGH: 0
ABDOMINAL PAIN: 0
HEMATOCHEZIA: 0
CHILLS: 0

## 2023-09-18 ASSESSMENT — ACTIVITIES OF DAILY LIVING (ADL): CURRENT_FUNCTION: NO ASSISTANCE NEEDED

## 2023-09-18 NOTE — PATIENT INSTRUCTIONS
I recommend getting the new RSV vaccine (respiratory syncytial virus)      Patient Education   Personalized Prevention Plan  You are due for the preventive services outlined below.  Your care team is available to assist you in scheduling these services.  If you have already completed any of these items, please share that information with your care team to update in your medical record.  Health Maintenance Due   Topic Date Due    Kidney Microalbumin Urine Test  Never done    ANNUAL REVIEW OF HM ORDERS  05/02/2023    Flu Vaccine (1) 09/01/2023    Hemoglobin  06/16/2023     Hearing Loss: Care Instructions  Overview     Hearing loss is a sudden or slow decrease in how well you hear. It can range from slight to profound. Permanent hearing loss can occur with aging. It also can happen when you are exposed long-term to loud noise. Examples include listening to loud music, riding motorcycles, or being around other loud machines.  Hearing loss can affect your work and home life. It can make you feel lonely or depressed. You may feel that you have lost your independence. But hearing aids and other devices can help you hear better and feel connected to others.  Follow-up care is a key part of your treatment and safety. Be sure to make and go to all appointments, and call your doctor if you are having problems. It's also a good idea to know your test results and keep a list of the medicines you take.  How can you care for yourself at home?  Avoid loud noises whenever possible. This helps keep your hearing from getting worse.  Always wear hearing protection around loud noises.  Wear a hearing aid as directed.  A professional can help you pick a hearing aid that will work best for you.  You can also get hearing aids over the counter for mild to moderate hearing loss.  Have hearing tests as your doctor suggests. They can show whether your hearing has changed. Your hearing aid may need to be adjusted.  Use other devices as needed.  "These may include:  Telephone amplifiers and hearing aids that can connect to a television, stereo, radio, or microphone.  Devices that use lights or vibrations. These alert you to the doorbell, a ringing telephone, or a baby monitor.  Television closed-captioning. This shows the words at the bottom of the screen. Most new TVs can do this.  TTY (text telephone). This lets you type messages back and forth on the telephone instead of talking or listening. These devices are also called TDD. When messages are typed on the keyboard, they are sent over the phone line to a receiving TTY. The message is shown on a monitor.  Use text messaging, social media, and email if it is hard for you to communicate by telephone.  Try to learn a listening technique called speechreading. It is not lipreading. You pay attention to people's gestures, expressions, posture, and tone of voice. These clues can help you understand what a person is saying. Face the person you are talking to, and have them face you. Make sure the lighting is good. You need to see the other person's face clearly.  Think about counseling if you need help to adjust to your hearing loss.  When should you call for help?  Watch closely for changes in your health, and be sure to contact your doctor if:    You think your hearing is getting worse.     You have new symptoms, such as dizziness or nausea.   Where can you learn more?  Go to https://www.embraase.net/patiented  Enter R798 in the search box to learn more about \"Hearing Loss: Care Instructions.\"  Current as of: March 1, 2023               Content Version: 13.7    5848-7873 [email or web address].   Care instructions adapted under license by your healthcare professional. If you have questions about a medical condition or this instruction, always ask your healthcare professional. [email or web address] disclaims any warranty or liability for your use of this information.      Bladder Training: Care " Instructions  Your Care Instructions     Bladder training is used to treat urge incontinence and stress incontinence. Urge incontinence means that the need to urinate comes on so fast that you can't get to a toilet in time. Stress incontinence means that you leak urine because of pressure on your bladder. For example, it may happen when you laugh, cough, or lift something heavy.  Bladder training can increase how long you can wait before you have to urinate. It can also help your bladder hold more urine. And it can give you better control over the urge to urinate.  It is important to remember that bladder training takes a few weeks to a few months to make a difference. You may not see results right away, but don't give up.  Follow-up care is a key part of your treatment and safety. Be sure to make and go to all appointments, and call your doctor if you are having problems. It's also a good idea to know your test results and keep a list of the medicines you take.  How can you care for yourself at home?  Work with your doctor to come up with a bladder training program that is right for you. You may use one or more of the following methods.  Delayed urination  In the beginning, try to keep from urinating for 5 minutes after you first feel the need to go.  While you wait, take deep, slow breaths to relax. Kegel exercises can also help you delay the need to go to the bathroom.  After some practice, when you can easily wait 5 minutes to urinate, try to wait 10 minutes before you urinate.  Slowly increase the waiting period until you are able to control when you have to urinate.  Scheduled urination  Empty your bladder when you first wake up in the morning.  Schedule times throughout the day when you will urinate.  Start by going to the bathroom every hour, even if you don't need to go.  Slowly increase the time between trips to the bathroom.  When you have found a schedule that works well for you, keep doing it.  If you  "wake up during the night and have to urinate, do it. Apply your schedule to waking hours only.  Kegel exercises  These tighten and strengthen pelvic muscles, which can help you control the flow of urine. (If doing these exercises causes pain, stop doing them and talk with your doctor.) To do Kegel exercises:  Squeeze your muscles as if you were trying not to pass gas. Or squeeze your muscles as if you were stopping the flow of urine. Your belly, legs, and buttocks shouldn't move.  Hold the squeeze for 3 seconds, then relax for 5 to 10 seconds.  Start with 3 seconds, then add 1 second each week until you are able to squeeze for 10 seconds.  Repeat the exercise 10 times a session. Do 3 to 8 sessions a day.  When should you call for help?  Watch closely for changes in your health, and be sure to contact your doctor if:    Your incontinence is getting worse.     You do not get better as expected.   Where can you learn more?  Go to https://www.[employer].net/patiented  Enter V684 in the search box to learn more about \"Bladder Training: Care Instructions.\"  Current as of: March 1, 2023               Content Version: 13.7    1195-2501 [employer].   Care instructions adapted under license by your healthcare professional. If you have questions about a medical condition or this instruction, always ask your healthcare professional. [employer] disclaims any warranty or liability for your use of this information.         "

## 2023-09-18 NOTE — PROGRESS NOTES
The patient was provided with written information regarding signs of hearing loss.  Information on urinary incontinence and treatment options given to patient.

## 2023-09-18 NOTE — PROGRESS NOTES
"SUBJECTIVE:   Kalpana is a 83 year old who presents for Preventive Visit.      9/18/2023     3:29 PM   Additional Questions   Roomed by as   Accompanied by self         9/18/2023     3:29 PM   Patient Reported Additional Medications   Patient reports taking the following new medications no     HPI: Kalpana is a delightful 83-year-old female presenting today accompanied by her .  Overall the patient is doing very well with only a couple of questions today.  She does need a refill on her trospium.  She states that that does seem to be helping and she is getting up much less often during the night.  Also, the patient has questions whether there is any treatment for her toenail fungus.  One of the nails is a little unstable and her  assists her in cutting her nails because they are thick.    Are you in the first 12 months of your Medicare coverage?  No    Healthy Habits:     In general, how would you rate your overall health?  Good    Frequency of exercise:  2-3 days/week    Duration of exercise:  15-30 minutes    Do you usually eat at least 4 servings of fruit and vegetables a day, include whole grains    & fiber and avoid regularly eating high fat or \"junk\" foods?  Yes    Taking medications regularly:  Yes    Medication side effects:  None    Ability to successfully perform activities of daily living:  No assistance needed    Home Safety:  No safety concerns identified    Hearing Impairment:  Difficulty following a conversation in a noisy restaurant or crowded room and no hearing concerns    In the past 6 months, have you been bothered by leaking of urine? Yes    In general, how would you rate your overall mental or emotional health?  Excellent    Additional concerns today:  No        Have you ever done Advance Care Planning? (For example, a Health Directive, POLST, or a discussion with a medical provider or your loved ones about your wishes): Yes, advance care planning is on file.       Fall risk   "     Cognitive Screening   1) Repeat 3 items (Leader, Season, Table)    2) Clock draw: NORMAL  3) 3 item recall: Recalls 3 objects  Results: 3 items recalled: COGNITIVE IMPAIRMENT LESS LIKELY    Mini-CogTM Copyright MARI Diaz. Licensed by the author for use in [facility]; reprinted with permission (lisa@[facility]). All rights reserved.      Do you have sleep apnea, excessive snoring or daytime drowsiness? : no    Reviewed and updated as needed this visit by clinical staff   Tobacco  Allergies               Reviewed and updated as needed this visit by Provider                 Social History     Tobacco Use     Smoking status: Never     Smokeless tobacco: Never   Substance Use Topics     Alcohol use: Yes     Comment: Alcoholic Drinks/day: rare             9/18/2023     3:16 PM   Alcohol Use   Prescreen: >3 drinks/day or >7 drinks/week? Not Applicable     Do you have a current opioid prescription? No  Do you use any other controlled substances or medications that are not prescribed by a provider? None          Current providers sharing in care for this patient include:   Patient Care Team:  Mela López MD as PCP - General  Mela López MD as Assigned PCP    The following health maintenance items are reviewed in Epic and correct as of today:  Health Maintenance   Topic Date Due     MICROALBUMIN  Never done     ANNUAL REVIEW OF HM ORDERS  05/02/2023     INFLUENZA VACCINE (1) 09/01/2023     HEMOGLOBIN  06/16/2023     MEDICARE ANNUAL WELLNESS VISIT  08/31/2023     LIPID  05/22/2024     MAMMO SCREENING  08/02/2024     BMP  08/03/2024     COLORECTAL CANCER SCREENING  08/12/2024     FALL RISK ASSESSMENT  09/18/2024     DTAP/TDAP/TD IMMUNIZATION (2 - Td or Tdap) 07/24/2027     ADVANCE CARE PLANNING  08/31/2027     DEXA  09/17/2036     PHQ-2 (once per calendar year)  Completed     Pneumococcal Vaccine: 65+ Years  Completed     URINALYSIS  Completed     ZOSTER IMMUNIZATION  Completed     COVID-19  Vaccine  Completed     IPV IMMUNIZATION  Aged Out     HPV IMMUNIZATION  Aged Out     MENINGITIS IMMUNIZATION  Aged Out     Patient Active Problem List   Diagnosis     Basal Cell Carcinoma Of The Skin     Hyperlipidemia     Onychomycosis     Osteopenia     Osteoarthritis Of The Knee     Eczema     Limb Swelling     Prediabetes     Varicose vein of leg     Midline cystocele     Seasonal allergic rhinitis     Overactive bladder     Hoarseness     Chronic kidney disease, stage 3a (H)     Benign essential hypertension     Past Surgical History:   Procedure Laterality Date     BIOPSY BREAST Left 1987     BIOPSY BREAST Right 1992     BIOPSY OF BREAST, INCISIONAL      Description: Biopsy Breast Open;  Recorded: 05/19/2008;     HC REMOVE TONSILS/ADENOIDS,<13 Y/O      Description: Tonsillectomy With Adenoidectomy;  Recorded: 05/19/2008;     REVISE SECONDARY VARICOSITY      Description: Varicose Vein Ligation;  Recorded: 05/19/2008;     TOTAL KNEE ARTHROPLASTY Right 08/2008     Z APPENDECTOMY      Description: Appendectomy;  Recorded: 05/19/2008;       Social History     Tobacco Use     Smoking status: Never     Smokeless tobacco: Never   Substance Use Topics     Alcohol use: Yes     Comment: Alcoholic Drinks/day: rare     Family History   Problem Relation Age of Onset     Varicose Veins Mother          Current Outpatient Medications   Medication Sig Dispense Refill     alendronate (FOSAMAX) 70 MG tablet Take 1 tablet (70 mg) by mouth every 7 days 12 tablet 0     cholecalciferol, vitamin D3, (VITAMIN D3) 2,000 unit Tab 1,000 Units daily       GLUCOSAM HCL/CHONDRO MONTES A/C/MN (GLUCOSAMINE-CHONDROITIN COMPLX ORAL) [GLUCOSAM HCL/CHONDRO MONTES A/C/MN (GLUCOSAMINE-CHONDROITIN COMPLX ORAL)] Take 1 tablet by mouth daily. Tablet       hydrochlorothiazide (HYDRODIURIL) 12.5 MG tablet Take 1 tablet (12.5 mg) by mouth daily 90 tablet 1     multivitamin capsule [MULTIVITAMIN CAPSULE] Take 1 capsule by mouth daily.       simvastatin (ZOCOR)  "10 MG tablet TAKE 1 TABLET (10 MG) BY MOUTH DAILY. 90 tablet 1     trospium (SANCTURA) 20 MG tablet TAKE 1 TABLET(20 MG) BY MOUTH TWICE DAILY BEFORE MEALS 180 tablet 3     Allergies   Allergen Reactions     Bactrim [Sulfamethoxazole-Trimethoprim] Dizziness     Sulfamethoxazole-Trimethoprim Dizziness         Mammogram Screening: Mammogram Screening - Patient over age 75, has elected to continue with screening.    Pertinent mammograms are reviewed under the imaging tab.    Review of Systems   Constitutional:  Negative for chills.   HENT:  Negative for congestion.    Respiratory:  Negative for cough.    Cardiovascular:  Negative for chest pain.   Gastrointestinal:  Negative for abdominal pain, constipation, diarrhea and hematochezia.   Genitourinary:  Negative for hematuria.         OBJECTIVE:   /74 (BP Location: Left arm, Patient Position: Left side, Cuff Size: Adult Large)   Pulse 79   Temp 97.8  F (36.6  C) (Oral)   Resp 16   Ht 1.651 m (5' 5\")   Wt 68.7 kg (151 lb 6.4 oz)   SpO2 97%   BMI 25.19 kg/m   Estimated body mass index is 25.19 kg/m  as calculated from the following:    Height as of this encounter: 1.651 m (5' 5\").    Weight as of this encounter: 68.7 kg (151 lb 6.4 oz).  Physical Exam  GENERAL APPEARANCE: healthy, alert and no distress  EYES: Eyes grossly normal to inspection, PERRL and conjunctivae and sclerae normal  HENT: ear canals and TM's normal, nose and mouth without ulcers or lesions, oropharynx clear and oral mucous membranes moist  NECK: no adenopathy, no asymmetry, masses, or scars and thyroid normal to palpation  RESP: lungs clear to auscultation - no rales, rhonchi or wheezes  BREAST: normal without masses, tenderness or nipple discharge and no palpable axillary masses or adenopathy  CV: regular rate and rhythm, normal S1 S2, no S3 or S4, no murmur, click or rub, no peripheral edema and peripheral pulses strong  ABDOMEN: soft, nontender, no hepatosplenomegaly, no masses and bowel " sounds normal  MS: no musculoskeletal defects are noted and gait is age appropriate without ataxia  SKIN: no suspicious lesions or rashes  NEURO: Normal strength and tone, sensory exam grossly normal, mentation intact and speech normal  PSYCH: mentation appears normal and affect normal/bright    Labs reviewed in Epic    ASSESSMENT / PLAN:     Problem List Items Addressed This Visit          Endocrine    Hyperlipidemia     The patient takes simvastatin 10 mg daily.         Prediabetes     Reviewed most recent A1c from May which was down to 5.5 outside the prediabetic range.            Circulatory    Benign essential hypertension     Blood pressure under excellent control on hydrochlorothiazide 12.5 mg daily.            Musculoskeletal and Integumentary    Onychomycosis     The patient does have extensive involvement of onychomycosis involving both large toes.  However, we had a discussion today regarding the mostly cosmetic nature of this condition.  The patient is comfortable continuing to monitor and if the nails become painful she will let me know.         Osteopenia     Take Alendronate since 2018; due for Dexa scan and encouraged to schedule that.             Urinary    Overactive bladder     Refill provided today on her trospium 20 mg twice daily.         Relevant Medications    trospium (SANCTURA) 20 MG tablet    Chronic kidney disease, stage 3a (H)     Stable but I did check an updated renal panel today with the recent initiation of hydrochlorothiazide.         Relevant Orders    Renal panel (Alb, BUN, Ca, Cl, CO2, Creat, Gluc, Phos, K, Na) (Completed)     Other Visit Diagnoses       Encounter for Medicare annual wellness exam    -  Primary                  COUNSELING:  Reviewed preventive health counseling, as reflected in patient instructions       Regular exercise       Healthy diet/nutrition       Bladder control       Osteoporosis prevention/bone health       Colon cancer screening      BMI:   Estimated  "body mass index is 25.19 kg/m  as calculated from the following:    Height as of this encounter: 1.651 m (5' 5\").    Weight as of this encounter: 68.7 kg (151 lb 6.4 oz).         She reports that she has never smoked. She has never used smokeless tobacco.      Appropriate preventive services were discussed with this patient, including applicable screening as appropriate for cardiovascular disease, diabetes, osteopenia/osteoporosis, and glaucoma.  As appropriate for age/gender, discussed screening for colorectal cancer, prostate cancer, breast cancer, and cervical cancer. Checklist reviewing preventive services available has been given to the patient.    Reviewed patients plan of care and provided an AVS. The Basic Care Plan (routine screening as documented in Health Maintenance) for Kalpana meets the Care Plan requirement. This Care Plan has been established and reviewed with the Patient.      TANIKA YU MD  [facility]    Identified Health Risks:  I have reviewed Opioid Use Disorder and Substance Use Disorder risk factors and made any needed referrals.   "

## 2023-09-19 LAB
ALBUMIN SERPL BCG-MCNC: 4.2 G/DL (ref 3.5–5.2)
ANION GAP SERPL CALCULATED.3IONS-SCNC: 12 MMOL/L (ref 7–15)
BUN SERPL-MCNC: 29.9 MG/DL (ref 8–23)
CALCIUM SERPL-MCNC: 9.4 MG/DL (ref 8.8–10.2)
CHLORIDE SERPL-SCNC: 104 MMOL/L (ref 98–107)
CREAT SERPL-MCNC: 1.1 MG/DL (ref 0.51–0.95)
DEPRECATED HCO3 PLAS-SCNC: 26 MMOL/L (ref 22–29)
EGFRCR SERPLBLD CKD-EPI 2021: 50 ML/MIN/1.73M2
GLUCOSE SERPL-MCNC: 99 MG/DL (ref 70–99)
PHOSPHATE SERPL-MCNC: 3.3 MG/DL (ref 2.5–4.5)
POTASSIUM SERPL-SCNC: 3.7 MMOL/L (ref 3.4–5.3)
SODIUM SERPL-SCNC: 142 MMOL/L (ref 136–145)

## 2023-09-20 NOTE — ASSESSMENT & PLAN NOTE
The patient does have extensive involvement of onychomycosis involving both large toes.  However, we had a discussion today regarding the mostly cosmetic nature of this condition.  The patient is comfortable continuing to monitor and if the nails become painful she will let me know.

## 2023-09-20 NOTE — ASSESSMENT & PLAN NOTE
Stable but I did check an updated renal panel today with the recent initiation of hydrochlorothiazide.

## 2023-10-13 ENCOUNTER — LAB (OUTPATIENT)
Dept: LAB | Facility: CLINIC | Age: 83
End: 2023-10-13
Payer: COMMERCIAL

## 2023-10-13 ENCOUNTER — TELEPHONE (OUTPATIENT)
Dept: FAMILY MEDICINE | Facility: CLINIC | Age: 83
End: 2023-10-13
Payer: COMMERCIAL

## 2023-10-13 DIAGNOSIS — R30.0 DYSURIA: ICD-10-CM

## 2023-10-13 DIAGNOSIS — N39.0 ACUTE UTI: ICD-10-CM

## 2023-10-13 DIAGNOSIS — R30.0 DYSURIA: Primary | ICD-10-CM

## 2023-10-13 LAB
ALBUMIN UR-MCNC: 100 MG/DL
APPEARANCE UR: ABNORMAL
BACTERIA #/AREA URNS HPF: ABNORMAL /HPF
BILIRUB UR QL STRIP: NEGATIVE
COLOR UR AUTO: YELLOW
GLUCOSE UR STRIP-MCNC: NEGATIVE MG/DL
HGB UR QL STRIP: ABNORMAL
KETONES UR STRIP-MCNC: NEGATIVE MG/DL
LEUKOCYTE ESTERASE UR QL STRIP: ABNORMAL
NITRATE UR QL: NEGATIVE
PH UR STRIP: 7 [PH] (ref 5–8)
RBC #/AREA URNS AUTO: ABNORMAL /HPF
SP GR UR STRIP: 1.02 (ref 1–1.03)
UROBILINOGEN UR STRIP-ACNC: 0.2 E.U./DL
WBC #/AREA URNS AUTO: >100 /HPF

## 2023-10-13 PROCEDURE — 81001 URINALYSIS AUTO W/SCOPE: CPT

## 2023-10-13 PROCEDURE — 87086 URINE CULTURE/COLONY COUNT: CPT

## 2023-10-13 PROCEDURE — 87186 SC STD MICRODIL/AGAR DIL: CPT

## 2023-10-13 PROCEDURE — 87088 URINE BACTERIA CULTURE: CPT

## 2023-10-13 RX ORDER — TETRACYCLINE HYDROCHLORIDE 500 MG/1
500 CAPSULE ORAL 2 TIMES DAILY
Qty: 14 CAPSULE | Refills: 0 | Status: SHIPPED | OUTPATIENT
Start: 2023-10-13 | End: 2023-10-20

## 2023-10-13 NOTE — CONFIDENTIAL NOTE
Urinalysis compelling for infection.  She does have a positive urine culture from earlier this year.  This was treated with tetracycline.  We will start with tetracycline as she tolerated this medication in the past.

## 2023-10-13 NOTE — TELEPHONE ENCOUNTER
Left message to call back for: Kalpana  Information to relay to patient: Please forward call to Macie BACK     OV 9/18/23- overactive bladder on Trospium, chronic kidney disease stage 3    Routing to Dr López to advise on symptoms noted

## 2023-10-13 NOTE — TELEPHONE ENCOUNTER
I do think she needs to have a urinalysis checked.  I did place an order for that, however, We have the covering provider for me today check it as I will not be on the computer later today.

## 2023-10-13 NOTE — TELEPHONE ENCOUNTER
General Call    Contacts         Type Contact Phone/Fax    10/13/2023 09:15 AM CDT Phone (Incoming) Kalpana Manzo (Self) [phone] (M)          Reason for Call:  Symptoms    What are your questions or concerns:  Patient calling to update Dr. López about symptoms. She was seen for physical 9/18/2023 and was told to call with update if burning with urination recurs. She reports that the burning has returned for the past two days. Patient is requesting call back from care team to discuss.    Date of last appointment with provider: 9/18/2023    Could we send this information to you in [employer] or would you prefer to receive a phone call?:   Patient would prefer a phone call   Okay to leave a detailed message?: Yes at Cell number on file:    Telephone Information:   Mobile [phone]

## 2023-10-13 NOTE — TELEPHONE ENCOUNTER
UA came back and resulted- UC in process.      Routing to covering provider Dr Painting to advise on treatment.

## 2023-10-16 LAB — BACTERIA UR CULT: ABNORMAL

## 2023-10-16 RX ORDER — CEPHALEXIN 500 MG/1
500 CAPSULE ORAL 3 TIMES DAILY
Qty: 21 CAPSULE | Refills: 0 | Status: SHIPPED | OUTPATIENT
Start: 2023-10-16 | End: 2023-10-23

## 2023-10-16 NOTE — TELEPHONE ENCOUNTER
Please let patient know that we added the antibiotic tetracycline to her allergy list and I just sent in a prescription for Keflex.

## 2023-10-16 NOTE — TELEPHONE ENCOUNTER
Kalpana is calling to say that she took one dose of the Tetracycline and became very nauseated.  She realized that she was given this medication earlier this year and she put a big X through it.  I asked if she had an allergic reaction at that time.  Kalpana said it was due to the intense nausea.      Intolerance put on her allergy list.    Kalpana would like a new RX for her UTI- culture has resulted.      Routing to Dr López to advise on new RX.

## 2023-10-23 ENCOUNTER — ANCILLARY PROCEDURE (OUTPATIENT)
Dept: BONE DENSITY | Facility: CLINIC | Age: 83
End: 2023-10-23
Attending: FAMILY MEDICINE
Payer: COMMERCIAL

## 2023-10-23 DIAGNOSIS — M89.9 DISORDER OF BONE AND CARTILAGE: ICD-10-CM

## 2023-10-23 DIAGNOSIS — Z78.0 POSTMENOPAUSAL STATUS: ICD-10-CM

## 2023-10-23 DIAGNOSIS — M94.9 DISORDER OF BONE AND CARTILAGE: ICD-10-CM

## 2023-10-23 PROCEDURE — 77080 DXA BONE DENSITY AXIAL: CPT | Mod: TC | Performed by: PHYSICIAN ASSISTANT

## 2024-01-08 DIAGNOSIS — I10 BENIGN ESSENTIAL HYPERTENSION: ICD-10-CM

## 2024-01-08 RX ORDER — HYDROCHLOROTHIAZIDE 12.5 MG/1
12.5 TABLET ORAL DAILY
Qty: 90 TABLET | Refills: 3 | Status: SHIPPED | OUTPATIENT
Start: 2024-01-08 | End: 2024-08-22

## 2024-03-05 ENCOUNTER — TRANSFERRED RECORDS (OUTPATIENT)
Dept: HEALTH INFORMATION MANAGEMENT | Facility: CLINIC | Age: 84
End: 2024-03-05

## 2024-04-09 ENCOUNTER — TRANSFERRED RECORDS (OUTPATIENT)
Dept: HEALTH INFORMATION MANAGEMENT | Facility: CLINIC | Age: 84
End: 2024-04-09

## 2024-04-09 LAB — EJECTION FRACTION: NORMAL %

## 2024-05-20 ENCOUNTER — OFFICE VISIT (OUTPATIENT)
Dept: FAMILY MEDICINE | Facility: CLINIC | Age: 84
End: 2024-05-20
Payer: COMMERCIAL

## 2024-05-20 VITALS
HEIGHT: 65 IN | WEIGHT: 149.9 LBS | DIASTOLIC BLOOD PRESSURE: 72 MMHG | HEART RATE: 69 BPM | TEMPERATURE: 97.9 F | RESPIRATION RATE: 14 BRPM | OXYGEN SATURATION: 97 % | SYSTOLIC BLOOD PRESSURE: 134 MMHG | BODY MASS INDEX: 24.97 KG/M2

## 2024-05-20 DIAGNOSIS — I10 BENIGN ESSENTIAL HYPERTENSION: ICD-10-CM

## 2024-05-20 DIAGNOSIS — I51.3 ATRIAL THROMBUS: ICD-10-CM

## 2024-05-20 DIAGNOSIS — R94.39 ABNORMAL STRESS TEST: ICD-10-CM

## 2024-05-20 DIAGNOSIS — I49.9 IRREGULAR HEART BEAT: ICD-10-CM

## 2024-05-20 DIAGNOSIS — I82.512 CHRONIC DEEP VEIN THROMBOSIS (DVT) OF FEMORAL VEIN OF LEFT LOWER EXTREMITY (H): Primary | ICD-10-CM

## 2024-05-20 LAB
ATRIAL RATE - MUSE: 53 BPM
DIASTOLIC BLOOD PRESSURE - MUSE: NORMAL MMHG
INTERPRETATION ECG - MUSE: NORMAL
P AXIS - MUSE: 42 DEGREES
PR INTERVAL - MUSE: 142 MS
QRS DURATION - MUSE: 86 MS
QT - MUSE: 414 MS
QTC - MUSE: 388 MS
R AXIS - MUSE: 15 DEGREES
SYSTOLIC BLOOD PRESSURE - MUSE: NORMAL MMHG
T AXIS - MUSE: 19 DEGREES
VENTRICULAR RATE- MUSE: 53 BPM

## 2024-05-20 PROCEDURE — 91320 SARSCV2 VAC 30MCG TRS-SUC IM: CPT | Performed by: FAMILY MEDICINE

## 2024-05-20 PROCEDURE — 99214 OFFICE O/P EST MOD 30 MIN: CPT | Performed by: FAMILY MEDICINE

## 2024-05-20 PROCEDURE — 93010 ELECTROCARDIOGRAM REPORT: CPT | Performed by: GENERAL ACUTE CARE HOSPITAL

## 2024-05-20 PROCEDURE — G2211 COMPLEX E/M VISIT ADD ON: HCPCS | Performed by: FAMILY MEDICINE

## 2024-05-20 PROCEDURE — 99207 E-CONSULT TO CARDIOLOGY (ADULT OUTPT PROVIDER TO SPECIALIST WRITTEN QUESTION & RESPONSE): CPT | Performed by: FAMILY MEDICINE

## 2024-05-20 PROCEDURE — 90480 ADMN SARSCOV2 VAC 1/ONLY CMP: CPT | Performed by: FAMILY MEDICINE

## 2024-05-20 PROCEDURE — 93005 ELECTROCARDIOGRAM TRACING: CPT | Performed by: FAMILY MEDICINE

## 2024-05-20 RX ORDER — APIXABAN 5 MG/1
TABLET, FILM COATED ORAL
COMMUNITY
End: 2024-05-20

## 2024-05-20 RX ORDER — RESPIRATORY SYNCYTIAL VIRUS VACCINE 120MCG/0.5
0.5 KIT INTRAMUSCULAR ONCE
Qty: 1 EACH | Refills: 0 | Status: CANCELLED | OUTPATIENT
Start: 2024-05-20 | End: 2024-05-20

## 2024-05-20 NOTE — ASSESSMENT & PLAN NOTE
In December, while in [place], the patient presented initially with a concern regarding a large bruise on her left distal thigh.  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  She is on Eliquis 5 mg twice daily.  This was unprovoked without any history of trauma,  incapacity, or surgery.  I placed an E consult to hematology just to see if any further evaluation would be indicated considering the circumstances to the patient.

## 2024-05-20 NOTE — PROGRESS NOTES
Assessment & Plan   Problem List Items Addressed This Visit       Benign essential hypertension     Well-controlled on hydrochlorothiazide 12.5 mg daily.         Chronic deep vein thrombosis (DVT) of femoral vein of left lower extremity (H) - Primary     In December, while in [place], the patient presented initially with a concern regarding a large bruise on her left distal thigh.  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  She is on Eliquis 5 mg twice daily.  This was unprovoked without any history of trauma,  incapacity, or surgery.  I placed an E consult to hematology just to see if any further evaluation would be indicated considering the circumstances to the patient.         Relevant Medications    apixaban ANTICOAGULANT (ELIQUIS) 5 MG tablet    Other Relevant Orders    Adult E-Consult to Hematology (Outpt Provider to Specialist Written Question & Response)    Atrial thrombus     While hospitalized in April of this year with chest pain, the patient had a cardiac workup which included an echocardiogram.  This showed a left atrial thrombus.  She remains on Eliquis 5 mg twice daily as originally prescribed in February for a new onset DVT.  No prior history of atrial fibrillation.  I have placed an E consult wondering if any further cardiac monitoring would be indicated based on this finding.         Relevant Orders    Adult E-Consult to Cardiology (Outpt Provider to Specialist Written Question & Response)    Abnormal stress test     There was a fixed defect in the anterior regular wall suggestive of prior myocardial infarction.  The patient has no prior history for coronary disease.  Is on statin medication with her last LDL at 74.  I did request an E consult from cardiology as to whether any further imaging such as an angiogram would be indicated in this situation.  She is asymptomatic and has no prior knowledge of any cardiac event.          Relevant Orders    Adult E-Consult to Cardiology (Outpt  "Provider to Specialist Written Question & Response)     Other Visit Diagnoses       Irregular heart beat        EKG shows sinus bradycardia    Relevant Orders    EKG 12-lead, tracing only (Completed)               Rowena Acuna is a 84 year old patient who comes in today for a follow-up after returning from [place] for the winter.  The patient reports that in December she noticed a very large bruise on her left distal thigh.  She had no history of trauma or injury nor did she feel any pain.  She presented to the emergency room where an ultrasound demonstrated a femoral blood clot.  She did have a follow-up ultrasound in January which showed persistence of the same DVT.  In April she experienced some chest pain and was hospitalized.  A CT scan showed no signs of pulmonary embolism.  However, nuclear stress test suggested an old infarct and an echocardiogram showed an atrial thrombus.  The patient does note some mild swelling of her left leg.  She has no prior history for coronary disease and denies any chest pain or difficulty breathing.  She has no prior history for DVT and no family history for the same.  She has no known history of cancer.  follow up blood clots    History of Present Illness       Reason for visit:  Blood clot    She eats 2-3 servings of fruits and vegetables daily.She consumes 1 sweetened beverage(s) daily.She exercises with enough effort to increase her heart rate 9 or less minutes per day.  She exercises with enough effort to increase her heart rate 3 or less days per week.   She is taking medications regularly.           Objective    /72 (BP Location: Left arm, Patient Position: Left side, Cuff Size: Adult Large)   Pulse 69   Temp 97.9  F (36.6  C) (Oral)   Resp 14   Ht 1.651 m (5' 5\")   Wt 68 kg (149 lb 14.4 oz)   SpO2 97%   BMI 24.94 kg/m    Body mass index is 24.94 kg/m .  Physical Exam   GENERAL: alert and no distress  RESP: lungs clear to auscultation - no rales, rhonchi " or wheezes  CV: irregular heart rate ? Premature atrial beats; otherwise normal.    EKG - Reviewed and interpreted by me; sinus bradycardia without acute findings        The longitudinal plan of care for the diagnosis(es)/condition(s) as documented were addressed during this visit. Due to the added complexity in care, I will continue to support Kalpana in the subsequent management and with ongoing continuity of care.    Signed Electronically by: TANIKA YU MD

## 2024-05-20 NOTE — ASSESSMENT & PLAN NOTE
There was a fixed defect in the anterior regular wall suggestive of prior myocardial infarction.  The patient has no prior history for coronary disease.  Is on statin medication with her last LDL at 74.  I did request an E consult from cardiology as to whether any further imaging such as an angiogram would be indicated in this situation.  She is asymptomatic and has no prior knowledge of any cardiac event.

## 2024-05-20 NOTE — ASSESSMENT & PLAN NOTE
While hospitalized in April of this year with chest pain, the patient had a cardiac workup which included an echocardiogram.  This showed a left atrial thrombus.  She remains on Eliquis 5 mg twice daily as originally prescribed in February for a new onset DVT.  No prior history of atrial fibrillation.  I have placed an E consult wondering if any further cardiac monitoring would be indicated based on this finding.

## 2024-05-21 ENCOUNTER — E-CONSULT (OUTPATIENT)
Dept: CARDIOLOGY | Facility: CLINIC | Age: 84
End: 2024-05-21
Payer: COMMERCIAL

## 2024-05-21 DIAGNOSIS — I51.3 ATRIAL THROMBUS: Primary | ICD-10-CM

## 2024-05-21 DIAGNOSIS — R94.39 ABNORMAL STRESS TEST: ICD-10-CM

## 2024-05-21 PROCEDURE — 99207 PR NO CHARGE LOS: CPT | Performed by: INTERNAL MEDICINE

## 2024-05-21 NOTE — PROGRESS NOTES
5/21/2024     E-Consult has been denied due to: Complexity of question, needs in-person referral.    Interprofessional consultation requested by:  Mela López MD      Clinical Question/Purpose: MY CLINICAL QUESTION IS: This patient was in [place] over the winter, and was diagnosed with a femoral DVT. She had additional work up due to chest pain including Nuclear stress and echo. This found apical clot in LA and also suggested an old MI on stress test. She is on Eliquis long term. Does she possibly need further evaluation such as angiogram or cardiac monitoring for A Fib?    Patient assessment and information reviewed:     Recommendations:        The recommendations provided in this E-Consult are based on a review of clinical data pertinent to the clinical question presented, without a review of the patient's complete medical record or, the benefit of a comprehensive in-person or virtual patient evaluation. This consultation should not replace the clinical judgement and evaluation of the provider ordering this E-Consult. Any new clinical issues, or changes in patient status since the filing of this E-Consult will need to be taken into account when assessing these recommendations. Please contact me if you have further questions.    My total time spent reviewing clinical information and formulating assessment was 5 minutes.        Estefania Harden MD

## 2024-05-22 ENCOUNTER — E-CONSULT (OUTPATIENT)
Dept: HEMATOLOGY | Facility: CLINIC | Age: 84
End: 2024-05-22
Payer: COMMERCIAL

## 2024-05-22 PROCEDURE — 99451 NTRPROF PH1/NTRNET/EHR 5/>: CPT | Performed by: INTERNAL MEDICINE

## 2024-05-22 NOTE — PROGRESS NOTES
5/22/2024     E-Consult has been accepted.    Interprofessional consultation requested by:  Mela López MD      Clinical Question/Purpose: MY CLINICAL QUESTION IS: This patient is 84 with a spontaneous femoral DVT diagnosis from December. Presented with unusual large bruising on the thigh. She is on Eliquis but wondering if further evaluation for underlying cause (? Malignancy or other) would be indicated.     Patient assessment and information reviewed: 84 year old woman with unprovoked proximal DVT.      Recommendations:   No workup (CT scans etc) for malignancy is required for unprovoked VTE other than standard age-specific cancer screening, unless there are other symptoms of concern (weight loss, night sweats, pain, palpable mass etc).    Also I recommend checking antiophosphoplipid antibodies (anticardiolipin IgG and IgM, Beta-2-glycoprotein 1 IgG and IgM) and if positive should see Hematology in 3 months to repeat testing and  re: anticoagulant choice and duration.         The recommendations provided in this E-Consult are based on a review of clinical data pertinent to the clinical question presented, without a review of the patient's complete medical record or, the benefit of a comprehensive in-person or virtual patient evaluation. This consultation should not replace the clinical judgement and evaluation of the provider ordering this E-Consult. Any new clinical issues, or changes in patient status since the filing of this E-Consult will need to be taken into account when assessing these recommendations. Please contact me if you have further questions.    My total time spent reviewing clinical information and formulating assessment was 5 minutes.        Neo Grossman MD

## 2024-05-23 ENCOUNTER — TRANSFERRED RECORDS (OUTPATIENT)
Dept: HEALTH INFORMATION MANAGEMENT | Facility: CLINIC | Age: 84
End: 2024-05-23
Payer: COMMERCIAL

## 2024-05-23 ENCOUNTER — TELEPHONE (OUTPATIENT)
Dept: FAMILY MEDICINE | Facility: CLINIC | Age: 84
End: 2024-05-23
Payer: COMMERCIAL

## 2024-05-23 DIAGNOSIS — I82.512 CHRONIC DEEP VEIN THROMBOSIS (DVT) OF FEMORAL VEIN OF LEFT LOWER EXTREMITY (H): Primary | ICD-10-CM

## 2024-05-23 NOTE — TELEPHONE ENCOUNTER
Patient notified of provider's message as written, she verbalized understanding. Transferred to central scheduling line to set up lab only appointment.     Encouraged patient to call the clinic with further questions/concerns.     Al GARDINER RN  [facility]

## 2024-05-23 NOTE — TELEPHONE ENCOUNTER
Mela López MD to [facility] [place] 5/23/2024 12:46 PM   Attached Progress Notes   Please call patient regarding the E consult from hematology.  They did not feel that evaluating any further for possible cancer would be at all needed or indicated unless there were specific symptoms.  However, they did recommend a couple of blood tests and if those come back positive, that she be seen formally in consultation by hematologist as it could change or adjust the decision of what anticoagulant to use long-term.  I put those orders in her chart and she should be able to come in for lab only appointment at her convenience.

## 2024-05-23 NOTE — PROGRESS NOTES
Please call patient regarding the E consult from hematology.  They did not feel that evaluating any further for possible cancer would be at all needed or indicated unless there were specific symptoms.  However, they did recommend a couple of blood tests and if those come back positive, that she be seen formally in consultation by hematologist as it could change or adjust the decision of what anticoagulant to use long-term.  I put those orders in her chart and she should be able to come in for lab only appointment at her convenience.

## 2024-05-24 ENCOUNTER — LAB (OUTPATIENT)
Dept: LAB | Facility: CLINIC | Age: 84
End: 2024-05-24
Payer: COMMERCIAL

## 2024-05-24 DIAGNOSIS — I82.512 CHRONIC DEEP VEIN THROMBOSIS (DVT) OF FEMORAL VEIN OF LEFT LOWER EXTREMITY (H): ICD-10-CM

## 2024-05-24 PROCEDURE — 86147 CARDIOLIPIN ANTIBODY EA IG: CPT

## 2024-05-24 PROCEDURE — 86146 BETA-2 GLYCOPROTEIN ANTIBODY: CPT

## 2024-05-24 PROCEDURE — 36415 COLL VENOUS BLD VENIPUNCTURE: CPT

## 2024-05-28 LAB
B2 GLYCOPROT1 IGG SERPL IA-ACNC: <0.8 U/ML
B2 GLYCOPROT1 IGM SERPL IA-ACNC: 3.4 U/ML
CARDIOLIPIN IGG SER IA-ACNC: <2 GPL-U/ML
CARDIOLIPIN IGG SER IA-ACNC: NEGATIVE
CARDIOLIPIN IGM SER IA-ACNC: 6.3 MPL-U/ML
CARDIOLIPIN IGM SER IA-ACNC: NEGATIVE

## 2024-06-10 ENCOUNTER — OFFICE VISIT (OUTPATIENT)
Dept: CARDIOLOGY | Facility: CLINIC | Age: 84
End: 2024-06-10
Attending: FAMILY MEDICINE
Payer: COMMERCIAL

## 2024-06-10 VITALS
BODY MASS INDEX: 23.13 KG/M2 | SYSTOLIC BLOOD PRESSURE: 126 MMHG | WEIGHT: 147.4 LBS | HEIGHT: 67 IN | RESPIRATION RATE: 12 BRPM | DIASTOLIC BLOOD PRESSURE: 70 MMHG | HEART RATE: 68 BPM

## 2024-06-10 DIAGNOSIS — I25.10 CORONARY ARTERY DISEASE INVOLVING NATIVE CORONARY ARTERY OF NATIVE HEART WITHOUT ANGINA PECTORIS: Primary | ICD-10-CM

## 2024-06-10 DIAGNOSIS — E78.5 DYSLIPIDEMIA: ICD-10-CM

## 2024-06-10 DIAGNOSIS — I51.3 APICAL MURAL THROMBUS: ICD-10-CM

## 2024-06-10 DIAGNOSIS — I10 HYPERTENSION, UNSPECIFIED TYPE: ICD-10-CM

## 2024-06-10 DIAGNOSIS — R94.39 ABNORMAL STRESS TEST: ICD-10-CM

## 2024-06-10 DIAGNOSIS — I51.3 ATRIAL THROMBUS: ICD-10-CM

## 2024-06-10 PROCEDURE — 99203 OFFICE O/P NEW LOW 30 MIN: CPT | Performed by: INTERNAL MEDICINE

## 2024-06-10 NOTE — LETTER
"6/10/2024    TANIKA LÓPEZ MD  [address]  [facility] [address]    RE: Kalpana Manzo       Dear Colleague,     I had the pleasure of seeing Kalpana Manzo in the [facility].         [facility] [address], [place] [address]   [email or web address]   OFFICE: [phone]          Thank you Dr. López for asking the [facility] team to participate in the care of your patient, Kalpana Manzo.     Impression and Plan     1.  Possible coronary artery disease. Kalpana mejia has possible coronary artery disease based on nuclear perfusion imaging study 9 April 2024 revealing a small fixed perfusion defect with distal anterior hypokinesis reported though ejection fraction greater than 70%.  She had an echocardiogram performed 9 April 2024 as well that reported a \"small apical mural thrombus\".  Interestingly, CT scan of chest 16 June 2022 despite advanced age revealed no coronary calcification.  Given the small and fixed nature of the defect and lack of concerning symptoms would advocate conservative/medical management at this point.    I discussed the aforementioned with Kalpana and her spouse, Adebayo, who were reassured and indicate that they had similar recommendations from her cardiologist in [place].  No additional workup is felt needed at this time.    2.  Left ventricular apical mural thrombus.  Small apical mural thrombus was reported on echocardiogram 9 April 2024 despite well-preserved left ventricular systolic performance.  She already, however, has been maintained on apixaban for CVA prophylaxis.  Continue apixaban.    3.  Hypertension.  Blood pressure is quite reasonable in the office today at 126/70 mmHg.    4.  Dyslipidemia.  Lipid profile 22 May 2023 revealed LDL 74 mg/dL and HDL 57 mg/dL.  Continue statin therapy.    Will plan on following up just before she returns back to [place].    35 minutes spent reviewing prior " "records (including documentation, laboratory studies, cardiac testing/imaging), interview with patient along with physical exam, planning, and subsequent documentation/crafting of note).           History of Present Illness    Once again I would like to thank you again for asking me to participate in the care of your patient, Kalpana Manzo.  As you know, but to reiterate for my own records, Kalpana Manzo is a 84 year old female with possible coronary artery disease.  Kalpana mejia has possible coronary artery disease based on nuclear perfusion imaging study 9 April 2024 revealing a small fixed perfusion defect with distal anterior hypokinesis reported though ejection fraction greater than 70%.  She had an echocardiogram performed 9 April 2024 as well that reported a \"small apical mural thrombus\".  Interestingly, CT scan of chest 16 June 2022 despite advanced age revealed no coronary calcification.    Further review of systems is otherwise negative/noncontributory (medical record and 13 point review of systems reviewed as well and pertinent positives noted).         Cardiac Diagnostics      Echocardiogram 9 April 2024:  Normal left ventricular size and systolic performance with ejection fraction of 55-60%.  There is a small apical mural thrombus.  No significant valvular heart disease.  Normal right ventricular size and systolic performance.  Normal atrial dimensions.    Nuclear perfusion imaging study 9 April 2024:  There is a small fixed perfusion defect without ischemia in the distal anterior segment consistent with myocardial infarction.  Normal left ventricular systolic performance with ejection fraction greater than 70% post stress.  Distal anterior hypokinesis noted.  Stress echocardiogram 14 December 2022:  No evidence of infarct or ischemia.  Normal left ventricular systolic performance with ejection fraction of 60 to 65%.  No significant valvular heart disease on screening Doppler.  No ECG evidence of " "ischemia.    CT scan of chest 16 June 2022:  No acute pulmonary embolism or aortopathy.  No acute lung, airway, or pleural inflammatory process.  Cholelithiasis.  Coronary calcification: None.    History of DVT.  Patient has been maintained on apixaban.         Physical Examination       /70 (BP Location: Left arm, Patient Position: Sitting, Cuff Size: Adult Regular)   Pulse 68   Resp 12   Ht 1.689 m (5' 6.5\")   Wt 66.9 kg (147 lb 6.4 oz)   BMI 23.43 kg/m          Wt Readings from Last 3 Encounters:   06/10/24 66.9 kg (147 lb 6.4 oz)   05/20/24 68 kg (149 lb 14.4 oz)   09/18/23 68.7 kg (151 lb 6.4 oz)       The patient is alert and oriented times three. Sclerae are anicteric. Mucosal membranes are moist. Jugular venous pressure is normal. No significant adenopathy/thyromegally appreciated. Lungs are clear with good expansion. On cardiovascular exam, the patient has a regular S1 and S2. Abdomen is soft and non-tender. Extremities reveal no clubbing, cyanosis, or edema.         Family History/Social History/Risk Factors   Patient does not smoke.  Family history reviewed, and family history includes Varicose Veins in her mother.          Medical History  Surgical History Family History Social History   Past Medical History:   Diagnosis Date    Basal cell carcinoma of skin     Created by Conversion  Replacement Utility updated for latest IMO load    Contact dermatitis and other eczema, due to unspecified cause     Created by Conversion     Dermatophytosis of nail     Created by Conversion     Disorder of bone and cartilage     Created by Conversion  Replacement Utility updated for latest IMO load    Hepatitis     Created by Conversion [facility] Annotation: Aug 22 2011  9:09AM - Mela López: in the 1980's  unspecified  Replacement Utility updated for latest IMO load    Hyperlipidemia     Created by Conversion     Nocturia     Created by Conversion     Osteoarthrosis involving lower leg     Created by " Conversion  Replacement Utility updated for latest IMO load    Prediabetes 7/18/2016    Swelling of limb     Created by Conversion      Past Surgical History:   Procedure Laterality Date    BIOPSY BREAST Left 1987    BIOPSY BREAST Right 1992    BIOPSY OF BREAST, INCISIONAL      Description: Biopsy Breast Open;  Recorded: 05/19/2008;    HC REMOVE TONSILS/ADENOIDS,<11 Y/O      Description: Tonsillectomy With Adenoidectomy;  Recorded: 05/19/2008;    REVISE SECONDARY VARICOSITY      Description: Varicose Vein Ligation;  Recorded: 05/19/2008;    TOTAL KNEE ARTHROPLASTY Right 08/2008    ZZC APPENDECTOMY      Description: Appendectomy;  Recorded: 05/19/2008;     Family History   Problem Relation Age of Onset    Varicose Veins Mother         Social History     Socioeconomic History    Marital status:      Spouse name: Not on file    Number of children: Not on file    Years of education: Not on file    Highest education level: Not on file   Occupational History    Not on file   Tobacco Use    Smoking status: Never    Smokeless tobacco: Never   Vaping Use    Vaping status: Never Used   Substance and Sexual Activity    Alcohol use: Yes     Comment: Alcoholic Drinks/day: rare    Drug use: No    Sexual activity: Not on file   Other Topics Concern    Not on file   Social History Narrative    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      Social Determinants of Health     Financial Resource Strain: Not on file   Food Insecurity: Not on file   Transportation Needs: Not on file   Physical Activity: Not on file   Stress: Not on file   Social Connections: Not on file   Interpersonal Safety: Not on file   Housing Stability: Not on file           Medications  Allergies   Current Outpatient Medications   Medication Sig Dispense Refill    apixaban ANTICOAGULANT (ELIQUIS) 5 MG tablet Take 1 tablet (5 mg) by mouth 2 times daily 180 tablet 3    cholecalciferol, vitamin D3, (VITAMIN D3) 2,000 unit Tab Take 1,000 Units by mouth daily      GLUCOSAM  "HCL/CHONDRO MONTES A/C/MN (GLUCOSAMINE-CHONDROITIN COMPLX ORAL) [GLUCOSAM HCL/CHONDRO MONTES A/C/MN (GLUCOSAMINE-CHONDROITIN COMPLX ORAL)] Take 1 tablet by mouth daily. Tablet      hydrochlorothiazide (HYDRODIURIL) 12.5 MG tablet TAKE 1 TABLET(12.5 MG) BY MOUTH DAILY 90 tablet 3    multivitamin capsule [MULTIVITAMIN CAPSULE] Take 1 capsule by mouth daily.      simvastatin (ZOCOR) 10 MG tablet TAKE 1 TABLET (10 MG) BY MOUTH DAILY. 90 tablet 1       Allergies   Allergen Reactions    Bactrim [Sulfamethoxazole-Trimethoprim] Dizziness    Sulfamethoxazole-Trimethoprim Dizziness    Tetracycline Nausea          Lab Results    Chemistry/lipid CBC Cardiac Enzymes/BNP/TSH/INR   Recent Labs   Lab Test 05/22/23  0934   CHOL 141   HDL 57   LDL 74   TRIG 50     Recent Labs   Lab Test 05/22/23  0934 08/30/21  1010 07/06/20  0913   LDL 74 86 90     Recent Labs   Lab Test 09/18/23  1638      POTASSIUM 3.7   CHLORIDE 104   CO2 26   GLC 99   BUN 29.9*   CR 1.10*   GFRESTIMATED 50*   DAVID 9.4     Recent Labs   Lab Test 09/18/23  1638 08/03/23  0939 05/22/23  0934   CR 1.10* 1.07* 0.92     Recent Labs   Lab Test 05/22/23  0934 08/30/21  1010 07/06/20  0913   A1C 5.5 5.7* 5.6          Recent Labs   Lab Test 06/16/22  0747   WBC 5.6   HGB 13.5   HCT 42.9   MCV 97        Recent Labs   Lab Test 06/16/22  0747 06/08/22  1636   HGB 13.5 13.8    Recent Labs   Lab Test 06/16/22  0747 06/08/22  1636   TROPONINI <0.01 <0.01     Recent Labs   Lab Test 06/08/22  1636        No results for input(s): \"TSH\" in the last 53622 hours.  No results for input(s): \"INR\" in the last 20401 hours.       Medications  Allergies   Current Outpatient Medications   Medication Sig Dispense Refill    apixaban ANTICOAGULANT (ELIQUIS) 5 MG tablet Take 1 tablet (5 mg) by mouth 2 times daily 180 tablet 3    cholecalciferol, vitamin D3, (VITAMIN D3) 2,000 unit Tab Take 1,000 Units by mouth daily      GLUCOSAM HCL/CHONDRO MONTES A/C/MN (GLUCOSAMINE-CHONDROITIN COMPLX " "ORAL) [GLUCOSAM HCL/CHONDRO MONTES A/C/MN (GLUCOSAMINE-CHONDROITIN COMPLX ORAL)] Take 1 tablet by mouth daily. Tablet      hydrochlorothiazide (HYDRODIURIL) 12.5 MG tablet TAKE 1 TABLET(12.5 MG) BY MOUTH DAILY 90 tablet 3    multivitamin capsule [MULTIVITAMIN CAPSULE] Take 1 capsule by mouth daily.      simvastatin (ZOCOR) 10 MG tablet TAKE 1 TABLET (10 MG) BY MOUTH DAILY. 90 tablet 1      Allergies   Allergen Reactions    Bactrim [Sulfamethoxazole-Trimethoprim] Dizziness    Sulfamethoxazole-Trimethoprim Dizziness    Tetracycline Nausea          Lab Results   Lab Results   Component Value Date     09/18/2023    CO2 26 09/18/2023    CO2 23 06/16/2022    BUN 29.9 09/18/2023    BUN 21 06/16/2022     Lab Results   Component Value Date    WBC 5.6 06/16/2022    HGB 13.5 06/16/2022    HCT 42.9 06/16/2022    MCV 97 06/16/2022     06/16/2022     Lab Results   Component Value Date    CHOL 141 05/22/2023    TRIG 50 05/22/2023    TRIG 96 03/18/2022    HDL 57 05/22/2023     No results found for: \"INR\"  Lab Results   Component Value Date     06/08/2022     Lab Results   Component Value Date    TROPONINI <0.01 06/16/2022    TROPONINI <0.01 06/08/2022     No results found for: \"TSH\"                   Thank you for allowing me to participate in the care of your patient.      Sincerely,     Elias Fernández MD     [facility]  cc:   Mela López MD  [address] [place] [address]      "

## 2024-06-10 NOTE — PROGRESS NOTES
"       [facility]   [address], [place] [address]   [email or web address]   OFFICE: [phone]          Thank you Dr. López for asking the [facility] team to participate in the care of your patient, Kalpana Manzo.     Impression and Plan     1.  Possible coronary artery disease. Kalpana he has possible coronary artery disease based on nuclear perfusion imaging study 9 April 2024 revealing a small fixed perfusion defect with distal anterior hypokinesis reported though ejection fraction greater than 70%.  She had an echocardiogram performed 9 April 2024 as well that reported a \"small apical mural thrombus\".  Interestingly, CT scan of chest 16 June 2022 despite advanced age revealed no coronary calcification.  Given the small and fixed nature of the defect and lack of concerning symptoms would advocate conservative/medical management at this point.    I discussed the aforementioned with Kalpana and her spouse, Adebayo, who were reassured and indicate that they had similar recommendations from her cardiologist in [place].  No additional workup is felt needed at this time.    2.  Left ventricular apical mural thrombus.  Small apical mural thrombus was reported on echocardiogram 9 April 2024 despite well-preserved left ventricular systolic performance.  She already, however, has been maintained on apixaban for CVA prophylaxis.  Continue apixaban.    3.  Hypertension.  Blood pressure is quite reasonable in the office today at 126/70 mmHg.    4.  Dyslipidemia.  Lipid profile 22 May 2023 revealed LDL 74 mg/dL and HDL 57 mg/dL.  Continue statin therapy.    Will plan on following up just before she returns back to [place].    35 minutes spent reviewing prior records (including documentation, laboratory studies, cardiac testing/imaging), interview with patient along with physical exam, planning, and subsequent documentation/crafting of note).           History of Present " "Illness    Once again I would like to thank you again for asking me to participate in the care of your patient, Kalpana Manzo.  As you know, but to reiterate for my own records, Kalpana Manzo is a 84 year old female with possible coronary artery disease.  Kalpana mejia has possible coronary artery disease based on nuclear perfusion imaging study 9 April 2024 revealing a small fixed perfusion defect with distal anterior hypokinesis reported though ejection fraction greater than 70%.  She had an echocardiogram performed 9 April 2024 as well that reported a \"small apical mural thrombus\".  Interestingly, CT scan of chest 16 June 2022 despite advanced age revealed no coronary calcification.    Further review of systems is otherwise negative/noncontributory (medical record and 13 point review of systems reviewed as well and pertinent positives noted).         Cardiac Diagnostics      Echocardiogram 9 April 2024:  Normal left ventricular size and systolic performance with ejection fraction of 55-60%.  There is a small apical mural thrombus.  No significant valvular heart disease.  Normal right ventricular size and systolic performance.  Normal atrial dimensions.    Nuclear perfusion imaging study 9 April 2024:  There is a small fixed perfusion defect without ischemia in the distal anterior segment consistent with myocardial infarction.  Normal left ventricular systolic performance with ejection fraction greater than 70% post stress.  Distal anterior hypokinesis noted.  Stress echocardiogram 14 December 2022:  No evidence of infarct or ischemia.  Normal left ventricular systolic performance with ejection fraction of 60 to 65%.  No significant valvular heart disease on screening Doppler.  No ECG evidence of ischemia.    CT scan of chest 16 June 2022:  No acute pulmonary embolism or aortopathy.  No acute lung, airway, or pleural inflammatory process.  Cholelithiasis.  Coronary calcification: None.    History of DVT.  Patient has " "been maintained on apixaban.         Physical Examination       /70 (BP Location: Left arm, Patient Position: Sitting, Cuff Size: Adult Regular)   Pulse 68   Resp 12   Ht 1.689 m (5' 6.5\")   Wt 66.9 kg (147 lb 6.4 oz)   BMI 23.43 kg/m          Wt Readings from Last 3 Encounters:   06/10/24 66.9 kg (147 lb 6.4 oz)   05/20/24 68 kg (149 lb 14.4 oz)   09/18/23 68.7 kg (151 lb 6.4 oz)       The patient is alert and oriented times three. Sclerae are anicteric. Mucosal membranes are moist. Jugular venous pressure is normal. No significant adenopathy/thyromegally appreciated. Lungs are clear with good expansion. On cardiovascular exam, the patient has a regular S1 and S2. Abdomen is soft and non-tender. Extremities reveal no clubbing, cyanosis, or edema.         Family History/Social History/Risk Factors   Patient does not smoke.  Family history reviewed, and family history includes Varicose Veins in her mother.          Medical History  Surgical History Family History Social History   Past Medical History:   Diagnosis Date    Basal cell carcinoma of skin     Created by Conversion  Replacement Utility updated for latest IMO load    Contact dermatitis and other eczema, due to unspecified cause     Created by Conversion     Dermatophytosis of nail     Created by Conversion     Disorder of bone and cartilage     Created by Conversion  Replacement Utility updated for latest IMO load    Hepatitis     Created by Conversion [facility] Annotation: Aug 22 2011  9:09AM - Mela López: in the 1980's  unspecified  Replacement Utility updated for latest IMO load    Hyperlipidemia     Created by Conversion     Nocturia     Created by Conversion     Osteoarthrosis involving lower leg     Created by Conversion  Replacement Utility updated for latest IMO load    Prediabetes 7/18/2016    Swelling of limb     Created by Conversion      Past Surgical History:   Procedure Laterality Date    BIOPSY BREAST Left 1987    BIOPSY " BREAST Right 1992    BIOPSY OF BREAST, INCISIONAL      Description: Biopsy Breast Open;  Recorded: 05/19/2008;    HC REMOVE TONSILS/ADENOIDS,<13 Y/O      Description: Tonsillectomy With Adenoidectomy;  Recorded: 05/19/2008;    REVISE SECONDARY VARICOSITY      Description: Varicose Vein Ligation;  Recorded: 05/19/2008;    TOTAL KNEE ARTHROPLASTY Right 08/2008    ZZC APPENDECTOMY      Description: Appendectomy;  Recorded: 05/19/2008;     Family History   Problem Relation Age of Onset    Varicose Veins Mother         Social History     Socioeconomic History    Marital status:      Spouse name: Not on file    Number of children: Not on file    Years of education: Not on file    Highest education level: Not on file   Occupational History    Not on file   Tobacco Use    Smoking status: Never    Smokeless tobacco: Never   Vaping Use    Vaping status: Never Used   Substance and Sexual Activity    Alcohol use: Yes     Comment: Alcoholic Drinks/day: rare    Drug use: No    Sexual activity: Not on file   Other Topics Concern    Not on file   Social History Narrative    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      Social Determinants of Health     Financial Resource Strain: Not on file   Food Insecurity: Not on file   Transportation Needs: Not on file   Physical Activity: Not on file   Stress: Not on file   Social Connections: Not on file   Interpersonal Safety: Not on file   Housing Stability: Not on file           Medications  Allergies   Current Outpatient Medications   Medication Sig Dispense Refill    apixaban ANTICOAGULANT (ELIQUIS) 5 MG tablet Take 1 tablet (5 mg) by mouth 2 times daily 180 tablet 3    cholecalciferol, vitamin D3, (VITAMIN D3) 2,000 unit Tab Take 1,000 Units by mouth daily      GLUCOSAM HCL/CHONDRO MONTES A/C/[place] (GLUCOSAMINE-CHONDROITIN COMPLX ORAL) [GLUCOSAM HCL/CHONDRO MONTES A/C/MN (GLUCOSAMINE-CHONDROITIN COMPLX ORAL)] Take 1 tablet by mouth daily. Tablet      hydrochlorothiazide (HYDRODIURIL) 12.5 MG tablet TAKE  "1 TABLET(12.5 MG) BY MOUTH DAILY 90 tablet 3    multivitamin capsule [MULTIVITAMIN CAPSULE] Take 1 capsule by mouth daily.      simvastatin (ZOCOR) 10 MG tablet TAKE 1 TABLET (10 MG) BY MOUTH DAILY. 90 tablet 1       Allergies   Allergen Reactions    Bactrim [Sulfamethoxazole-Trimethoprim] Dizziness    Sulfamethoxazole-Trimethoprim Dizziness    Tetracycline Nausea          Lab Results    Chemistry/lipid CBC Cardiac Enzymes/BNP/TSH/INR   Recent Labs   Lab Test 05/22/23  0934   CHOL 141   HDL 57   LDL 74   TRIG 50     Recent Labs   Lab Test 05/22/23  0934 08/30/21  1010 07/06/20  0913   LDL 74 86 90     Recent Labs   Lab Test 09/18/23  1638      POTASSIUM 3.7   CHLORIDE 104   CO2 26   GLC 99   BUN 29.9*   CR 1.10*   GFRESTIMATED 50*   DAVID 9.4     Recent Labs   Lab Test 09/18/23  1638 08/03/23  0939 05/22/23  0934   CR 1.10* 1.07* 0.92     Recent Labs   Lab Test 05/22/23  0934 08/30/21  1010 07/06/20  0913   A1C 5.5 5.7* 5.6          Recent Labs   Lab Test 06/16/22  0747   WBC 5.6   HGB 13.5   HCT 42.9   MCV 97        Recent Labs   Lab Test 06/16/22  0747 06/08/22  1636   HGB 13.5 13.8    Recent Labs   Lab Test 06/16/22  0747 06/08/22  1636   TROPONINI <0.01 <0.01     Recent Labs   Lab Test 06/08/22  1636        No results for input(s): \"TSH\" in the last 46458 hours.  No results for input(s): \"INR\" in the last 10627 hours.       Medications  Allergies   Current Outpatient Medications   Medication Sig Dispense Refill    apixaban ANTICOAGULANT (ELIQUIS) 5 MG tablet Take 1 tablet (5 mg) by mouth 2 times daily 180 tablet 3    cholecalciferol, vitamin D3, (VITAMIN D3) 2,000 unit Tab Take 1,000 Units by mouth daily      GLUCOSAM HCL/CHONDRO MONTES A/C/[place] (GLUCOSAMINE-CHONDROITIN COMPLX ORAL) [GLUCOSAM HCL/CHONDRO MONTES A/C/MN (GLUCOSAMINE-CHONDROITIN COMPLX ORAL)] Take 1 tablet by mouth daily. Tablet      hydrochlorothiazide (HYDRODIURIL) 12.5 MG tablet TAKE 1 TABLET(12.5 MG) BY MOUTH DAILY 90 tablet 3    " "multivitamin capsule [MULTIVITAMIN CAPSULE] Take 1 capsule by mouth daily.      simvastatin (ZOCOR) 10 MG tablet TAKE 1 TABLET (10 MG) BY MOUTH DAILY. 90 tablet 1      Allergies   Allergen Reactions    Bactrim [Sulfamethoxazole-Trimethoprim] Dizziness    Sulfamethoxazole-Trimethoprim Dizziness    Tetracycline Nausea          Lab Results   Lab Results   Component Value Date     09/18/2023    CO2 26 09/18/2023    CO2 23 06/16/2022    BUN 29.9 09/18/2023    BUN 21 06/16/2022     Lab Results   Component Value Date    WBC 5.6 06/16/2022    HGB 13.5 06/16/2022    HCT 42.9 06/16/2022    MCV 97 06/16/2022     06/16/2022     Lab Results   Component Value Date    CHOL 141 05/22/2023    TRIG 50 05/22/2023    TRIG 96 03/18/2022    HDL 57 05/22/2023     No results found for: \"INR\"  Lab Results   Component Value Date     06/08/2022     Lab Results   Component Value Date    TROPONINI <0.01 06/16/2022    TROPONINI <0.01 06/08/2022     No results found for: \"TSH\"               "

## 2024-07-03 ENCOUNTER — HOSPITAL ENCOUNTER (EMERGENCY)
Facility: CLINIC | Age: 84
Discharge: HOME | End: 2024-07-03
Attending: EMERGENCY MEDICINE | Admitting: EMERGENCY MEDICINE
Payer: COMMERCIAL

## 2024-07-03 ENCOUNTER — APPOINTMENT (OUTPATIENT)
Dept: CT IMAGING | Facility: CLINIC | Age: 84
End: 2024-07-03
Attending: EMERGENCY MEDICINE
Payer: COMMERCIAL

## 2024-07-03 VITALS
SYSTOLIC BLOOD PRESSURE: 136 MMHG | TEMPERATURE: 97.5 F | HEART RATE: 60 BPM | OXYGEN SATURATION: 97 % | BODY MASS INDEX: 25.71 KG/M2 | DIASTOLIC BLOOD PRESSURE: 69 MMHG | HEIGHT: 66 IN | WEIGHT: 160 LBS | RESPIRATION RATE: 16 BRPM

## 2024-07-03 DIAGNOSIS — S22.41XA CLOSED FRACTURE OF MULTIPLE RIBS OF RIGHT SIDE, INITIAL ENCOUNTER: ICD-10-CM

## 2024-07-03 LAB
ANION GAP SERPL CALCULATED.3IONS-SCNC: 11 MMOL/L (ref 7–15)
BASOPHILS # BLD AUTO: 0 10E3/UL (ref 0–0.2)
BASOPHILS NFR BLD AUTO: 1 %
BUN SERPL-MCNC: 19.6 MG/DL (ref 8–23)
CALCIUM SERPL-MCNC: 9.1 MG/DL (ref 8.8–10.2)
CHLORIDE SERPL-SCNC: 107 MMOL/L (ref 98–107)
CREAT SERPL-MCNC: 1.05 MG/DL (ref 0.51–0.95)
DEPRECATED HCO3 PLAS-SCNC: 25 MMOL/L (ref 22–29)
EGFRCR SERPLBLD CKD-EPI 2021: 52 ML/MIN/1.73M2
EOSINOPHIL # BLD AUTO: 0.1 10E3/UL (ref 0–0.7)
EOSINOPHIL NFR BLD AUTO: 2 %
ERYTHROCYTE [DISTWIDTH] IN BLOOD BY AUTOMATED COUNT: 12.7 % (ref 10–15)
GLUCOSE SERPL-MCNC: 136 MG/DL (ref 70–99)
HCT VFR BLD AUTO: 40.5 % (ref 35–47)
HGB BLD-MCNC: 13.7 G/DL (ref 11.7–15.7)
HOLD SPECIMEN: NORMAL
IMM GRANULOCYTES # BLD: 0 10E3/UL
IMM GRANULOCYTES NFR BLD: 0 %
LYMPHOCYTES # BLD AUTO: 2.1 10E3/UL (ref 0.8–5.3)
LYMPHOCYTES NFR BLD AUTO: 35 %
MCH RBC QN AUTO: 31.3 PG (ref 26.5–33)
MCHC RBC AUTO-ENTMCNC: 33.8 G/DL (ref 31.5–36.5)
MCV RBC AUTO: 93 FL (ref 78–100)
MONOCYTES # BLD AUTO: 0.4 10E3/UL (ref 0–1.3)
MONOCYTES NFR BLD AUTO: 7 %
NEUTROPHILS # BLD AUTO: 3.3 10E3/UL (ref 1.6–8.3)
NEUTROPHILS NFR BLD AUTO: 55 %
NRBC # BLD AUTO: 0 10E3/UL
NRBC BLD AUTO-RTO: 0 /100
PLATELET # BLD AUTO: 179 10E3/UL (ref 150–450)
POTASSIUM SERPL-SCNC: 3.5 MMOL/L (ref 3.4–5.3)
RBC # BLD AUTO: 4.38 10E6/UL (ref 3.8–5.2)
SODIUM SERPL-SCNC: 143 MMOL/L (ref 135–145)
WBC # BLD AUTO: 6 10E3/UL (ref 4–11)

## 2024-07-03 PROCEDURE — 80048 BASIC METABOLIC PNL TOTAL CA: CPT | Performed by: EMERGENCY MEDICINE

## 2024-07-03 PROCEDURE — 250N000011 HC RX IP 250 OP 636: Performed by: EMERGENCY MEDICINE

## 2024-07-03 PROCEDURE — 36415 COLL VENOUS BLD VENIPUNCTURE: CPT | Performed by: EMERGENCY MEDICINE

## 2024-07-03 PROCEDURE — 99285 EMERGENCY DEPT VISIT HI MDM: CPT | Mod: 25

## 2024-07-03 PROCEDURE — 250N000013 HC RX MED GY IP 250 OP 250 PS 637: Performed by: EMERGENCY MEDICINE

## 2024-07-03 PROCEDURE — 85025 COMPLETE CBC W/AUTO DIFF WBC: CPT | Performed by: EMERGENCY MEDICINE

## 2024-07-03 PROCEDURE — 71260 CT THORAX DX C+: CPT

## 2024-07-03 RX ORDER — ACETAMINOPHEN 325 MG/1
975 TABLET ORAL ONCE
Status: COMPLETED | OUTPATIENT
Start: 2024-07-03 | End: 2024-07-03

## 2024-07-03 RX ORDER — IOPAMIDOL 755 MG/ML
75 INJECTION, SOLUTION INTRAVASCULAR ONCE
Status: COMPLETED | OUTPATIENT
Start: 2024-07-03 | End: 2024-07-03

## 2024-07-03 RX ADMIN — IOPAMIDOL 75 ML: 755 INJECTION, SOLUTION INTRAVENOUS at 09:55

## 2024-07-03 RX ADMIN — ACETAMINOPHEN 975 MG: 325 TABLET ORAL at 08:02

## 2024-07-03 ASSESSMENT — ACTIVITIES OF DAILY LIVING (ADL)
ADLS_ACUITY_SCORE: 35

## 2024-07-03 ASSESSMENT — COLUMBIA-SUICIDE SEVERITY RATING SCALE - C-SSRS
1. IN THE PAST MONTH, HAVE YOU WISHED YOU WERE DEAD OR WISHED YOU COULD GO TO SLEEP AND NOT WAKE UP?: NO
2. HAVE YOU ACTUALLY HAD ANY THOUGHTS OF KILLING YOURSELF IN THE PAST MONTH?: NO
6. HAVE YOU EVER DONE ANYTHING, STARTED TO DO ANYTHING, OR PREPARED TO DO ANYTHING TO END YOUR LIFE?: NO

## 2024-07-03 NOTE — DISCHARGE INSTRUCTIONS
There appear to be nondisplaced fractures of your right fifth and sixth ribs with possible fracture of the right fourth rib as well. Please take extra strength tylenol (500 mg tablets), 2 tablets every 6 hours as needed for pain. You can also use an ice pack if you are having severe pain. You can use a heating pad for aching in your muscles.     There is also a lesion in your pancreas measuring 1.8 x 2.2 cm that will need follow-up with your regular doctor, likely for MRI imaging to further evaluate. Incidentally, you also have gallstones in your gallbladder and a left kidney stone measuring 6 x 9 x 13 mm that is not causing any issues.

## 2024-07-03 NOTE — ED TRIAGE NOTES
"  Pt reports unwitnessed fall 2 days ago on Monday evening around 8pm. She was in the kitchen and \"ran into\" a few chairs, landing on her right side. Denies LOC, denies hitting head. Pt was able to get up immediately. Pt is on eliquis for DVT and also has a \"clot in my heart.\" Pt reports right arm and right sided rib pain 7/10. A/O, ABC intact.     /80   Pulse 89   Temp 97.5  F (36.4  C) (Oral)   Resp 18   Ht 1.676 m (5' 6\")   Wt 72.6 kg (160 lb)   SpO2 96%   BMI 25.82 kg/m         Triage Assessment (Adult)       Row Name 07/03/24 0730          Triage Assessment    Airway WDL WDL        Respiratory WDL    Respiratory WDL WDL        Peripheral/Neurovascular WDL    Peripheral Neurovascular WDL WDL        Cognitive/Neuro/Behavioral WDL    Cognitive/Neuro/Behavioral WDL WDL                     "

## 2024-07-03 NOTE — ED PROVIDER NOTES
EMERGENCY DEPARTMENT ENCOUNTER      NAME: Kalpana Manzo  AGE: 84 year old female  YOB: 1940  MRN: [id number]  EVALUATION DATE & TIME: No admission date for patient encounter.    PCP: Mela López    ED PROVIDER: Yris Haider MD      Chief Complaint   Patient presents with    Fall         FINAL IMPRESSION:  1. Closed fracture of multiple ribs of right side, initial encounter          ED COURSE & MEDICAL DECISION MAKIN:38 AM I met with the patient to obtain patient history and performed a physical exam. Discussed plan for ED work up including potential diagnostic studies and interventions.  10:42 AM Reevaluated and updated the patient with findings. We discussed the plan for discharge and the patient is agreeable. Reviewed supportive cares, symptomatic treatment, outpatient follow up, and reasons to return to the Emergency Department. Patient to be discharged by ED RN.       Pertinent Labs & Imaging studies reviewed. (See chart for details)  84 year old female presents to the Emergency Department for evaluation of right anterior chest wall pain.  Patient reports that she fell 2 days ago.  She fell onto her right side and did not notice any immediate pain but as time has gone on has had an aching in her right side that is worse with certain movements.  No shortness of breath.  She had no LOC, did not hit her head.  She denies feeling lightheaded or having a syncopal event with the fall.  Her  reports that he heard the fall and went to help her up, she was laying on her right side.  Patient is on Eliquis.  She denies injury elsewhere, no headache.  No midline spinal tenderness.  No focal neurologic deficits.  On my exam she has right anterior chest wall tenderness, right posterior chest wall tenderness and is mildly tender in the right upper quadrant.  Given that she is on Eliquis, will plan for a CT scan of the chest, abdomen, pelvis to evaluate for rib fracture, acute  intra-abdominal injury.  Patient otherwise stable with no constitutional symptoms, denies feeling lightheaded or dizzy.  She and her  are comfortable with this plan.    CT scan of the chest, abdomen, pelvis demonstrates nondisplaced right rib fractures of ribs 5 and 6 and likely of rib 4.  I discussed this with the patient and recommend use of acetaminophen, heat and ice as needed for pain.  Patient otherwise with no other significant injury on imaging.  Patient is otherwise well-appearing, nontoxic and comfortable with plan for continued pain management at home.    At the conclusion of the encounter I discussed the results of all of the tests and the disposition. The questions were answered. The patient or family acknowledged understanding and was agreeable with the care plan.       Medical Decision Making  Obtained supplemental history:Supplemental history obtained?: Family Member/Significant Other  Reviewed external records: External records reviewed?: No  Care impacted by chronic illness:Chronic Kidney Disease, Hyperlipidemia, and Hypertension  Care significantly affected by social determinants of health:Access to Medical Care  Did you consider but not order tests?: Work up considered but not performed and documented in chart, if applicable  Did you interpret images independently?: Independent interpretation of ECG and images noted in documentation, when applicable.  Consultation discussion with other provider:Did you involve another provider (consultant, MH, pharmacy, etc.)?: No  Discharge. No recommendations on prescription strength medication(s). See documentation for any additional details.      MEDICATIONS GIVEN IN THE EMERGENCY:  Medications   acetaminophen (TYLENOL) tablet 975 mg (975 mg Oral $Given 7/3/24 0802)   iopamidol (ISOVUE-370) solution 75 mL (75 mLs Intravenous $Given 7/3/24 8197)       NEW PRESCRIPTIONS STARTED AT TODAY'S ER VISIT  Discharge Medication List as of 7/3/2024 11:02 AM              =================================================================    HPI    Patient information was obtained from: patient and     Use of : N/A         Kalpana Manzo is a 84 year old female with a pertinent history of osteoarthrosis involving lower leg, hyperlipidemia, chronic kidney disease stage 3a, hypertension, who presents to this ED by personal vehicle for evaluation of right upper arm pain and pain under right breast.    The patient complains of pain under her right breast and a bruise on her upper right arm. This pain resulted from a fall the patient had on Monday (7/1/2024). The patient was sitting in their kitchen and got up to walk to the cabinets, they proceeded to fall for an unknown reason on their right side and knocked down a few chairs in the process. The patient did not lose consciousness and did not hit her head but is taking Eliquis. Initially there was no pain but over the next few days the patient developed some bruising in their upper right arm and pain under their right breast. The patient describes the pain as an ache and claims that she is able to walk fine. She states that sitting is fine but laying down is painful and she needs help getting into bed. She took tylenol for the pain.     The patient denies any dizziness, lightheadedness, gait changes, or any other complaints at this time.       Per ,   He was in a different room but heard the fall. When he walked over, the patient was lying on her right side and he helped her up.       PAST MEDICAL HISTORY:  Past Medical History:   Diagnosis Date    Basal cell carcinoma of skin     Created by Conversion  Replacement Utility updated for latest IMO load    Contact dermatitis and other eczema, due to unspecified cause     Created by Conversion     Dermatophytosis of nail     Created by Conversion     Disorder of bone and cartilage     Created by Conversion  Replacement Utility updated for latest IMO load     Hepatitis     Created by Conversion [facility] Annotation: Aug 22 2011  9:09AM - Mela López: in the 1980's  unspecified  Replacement Utility updated for latest IMO load    Hyperlipidemia     Created by Conversion     Nocturia     Created by Conversion     Osteoarthrosis involving lower leg     Created by Conversion  Replacement Utility updated for latest IMO load    Prediabetes 7/18/2016    Swelling of limb     Created by Conversion        PAST SURGICAL HISTORY:  Past Surgical History:   Procedure Laterality Date    BIOPSY BREAST Left 1987    BIOPSY BREAST Right 1992    BIOPSY OF BREAST, INCISIONAL      Description: Biopsy Breast Open;  Recorded: 05/19/2008;    HC REMOVE TONSILS/ADENOIDS,<11 Y/O      Description: Tonsillectomy With Adenoidectomy;  Recorded: 05/19/2008;    REVISE SECONDARY VARICOSITY      Description: Varicose Vein Ligation;  Recorded: 05/19/2008;    TOTAL KNEE ARTHROPLASTY Right 08/2008    [facility] APPENDECTOMY      Description: Appendectomy;  Recorded: 05/19/2008;           CURRENT MEDICATIONS:    apixaban ANTICOAGULANT (ELIQUIS) 5 MG tablet  cholecalciferol, vitamin D3, (VITAMIN D3) 2,000 unit Tab  GLUCOSAM HCL/CHONDRO MONTES A/C/MN (GLUCOSAMINE-CHONDROITIN COMPLX ORAL)  hydrochlorothiazide (HYDRODIURIL) 12.5 MG tablet  multivitamin capsule  simvastatin (ZOCOR) 10 MG tablet        ALLERGIES:  Allergies   Allergen Reactions    Bactrim [Sulfamethoxazole-Trimethoprim] Dizziness    Sulfamethoxazole-Trimethoprim Dizziness    Tetracycline Nausea       FAMILY HISTORY:  Family History   Problem Relation Age of Onset    Varicose Veins Mother        SOCIAL HISTORY:   Social History     Socioeconomic History    Marital status:    Tobacco Use    Smoking status: Never    Smokeless tobacco: Never   Vaping Use    Vaping status: Never Used   Substance and Sexual Activity    Alcohol use: Yes     Comment: Alcoholic Drinks/day: rare    Drug use: No   Social History Narrative    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  PERRL.  EOMI.  No periorbital step-offs, depression, tenderness.  No tenderness along the zygomatic arch bilaterally.  Ears atraumatic with no external bleeding or signs of trauma.  No epistaxis.  Clear oropharynx.  Dentition intact.   Respiratory:  Normal respiratory rate.  Lungs CTA.  Chest wall stable to compression.  Nontender chest wall.   Trachea midline.  Cardiovascular:  Regular rate and rhythm.  Palpable radial and DP pulses bilaterally.  Abdomen:  Soft, nontender, normoactive bowel sounds.    Musculoskeletal:  No midline C-spine, T-spine, L-spine tenderness.  No midline, spinal step-offs noted.  FROM in all extremities.  5/5 strength in all extremities.  No gross deformities noted.  Pelvis stable.  Tender in anterior right ribs and posterior right ribs.  Integument:  No ecchymosis, abrasions, hematomas, lacerations noted.    Neuro:  GCS 15, A & O x 3, sensation intact to light touch    LAB:  All pertinent labs reviewed and interpreted.  Results for orders placed or performed during the hospital encounter of 07/03/24   CT Chest/Abdomen/Pelvis w Contrast    Impression    IMPRESSION:    1.  Subtle lateral right fifth and sixth rib cortical irregularity suggesting nondisplaced fractures, though fracture lucency is difficult to identify and these are technically age-indeterminate (correlate for acute pain). Possible additional subtle   nondisplaced anterior right fourth rib fracture. No pleural effusion or pneumothorax.    2.  No other acute post traumatic findings.    3.  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 Given the complex or septated appearance of this lesion, recommend follow-up contrast-enhanced " pancreatic MRI.    4.  Cholelithiasis.    5.  Nonobstructing 6 x 9 x 13 mm left renal stone.    6.  Please see report for other details.     Basic metabolic panel   Result Value Ref Range    Sodium 143 135 - 145 mmol/L    Potassium 3.5 3.4 - 5.3 mmol/L    Chloride 107 98 - 107 mmol/L    Carbon Dioxide (CO2) 25 22 - 29 mmol/L    Anion Gap 11 7 - 15 mmol/L    Urea Nitrogen 19.6 8.0 - 23.0 mg/dL    Creatinine 1.05 (H) 0.51 - 0.95 mg/dL    GFR Estimate 52 (L) >60 mL/min/1.73m2    Calcium 9.1 8.8 - 10.2 mg/dL    Glucose 136 (H) 70 - 99 mg/dL   CBC with platelets and differential   Result Value Ref Range    WBC Count 6.0 4.0 - 11.0 10e3/uL    RBC Count 4.38 3.80 - 5.20 10e6/uL    Hemoglobin 13.7 11.7 - 15.7 g/dL    Hematocrit 40.5 35.0 - 47.0 %    MCV 93 78 - 100 fL    MCH 31.3 26.5 - 33.0 pg    MCHC 33.8 31.5 - 36.5 g/dL    RDW 12.7 10.0 - 15.0 %    Platelet Count 179 150 - 450 10e3/uL    % Neutrophils 55 %    % Lymphocytes 35 %    % Monocytes 7 %    % Eosinophils 2 %    % Basophils 1 %    % Immature Granulocytes 0 %    NRBCs per 100 WBC 0 <1 /100    Absolute Neutrophils 3.3 1.6 - 8.3 10e3/uL    Absolute Lymphocytes 2.1 0.8 - 5.3 10e3/uL    Absolute Monocytes 0.4 0.0 - 1.3 10e3/uL    Absolute Eosinophils 0.1 0.0 - 0.7 10e3/uL    Absolute Basophils 0.0 0.0 - 0.2 10e3/uL    Absolute Immature Granulocytes 0.0 <=0.4 10e3/uL    Absolute NRBCs 0.0 10e3/uL   Extra Blue Top Tube   Result Value Ref Range    Hold Specimen [facility]        RADIOLOGY:  Reviewed all pertinent imaging. Please see official radiology report.  CT Chest/Abdomen/Pelvis w Contrast   Final Result   IMPRESSION:      1.  Subtle lateral right fifth and sixth rib cortical irregularity suggesting nondisplaced fractures, though fracture lucency is difficult to identify and these are technically age-indeterminate (correlate for acute pain). Possible additional subtle    nondisplaced anterior right fourth rib fracture. No pleural effusion or pneumothorax.      2.  No  other acute post traumatic findings.      3.  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 Given the complex or septated appearance of this lesion, recommend follow-up contrast-enhanced pancreatic MRI.      4.  Cholelithiasis.      5.  Nonobstructing 6 x 9 x 13 mm left renal stone.      6.  Please see report for other details.             EKG:    None    PROCEDURES:   None      I, Kenny Dowd, am serving as a scribe to document services personally performed by Yris Haider based on my observation and the provider's statements to me. I, Yris Haider, attest that Kenny Dowd is acting in a scribe capacity, has observed my performance of the services and has documented them in accordance with my direction.    Yris Haider MD  Emergency Medicine  [facility] EMERGENCY ROOM  [address] [address]  [phone]       Yris Haider MD  07/03/24 5575

## 2024-07-08 ENCOUNTER — OFFICE VISIT (OUTPATIENT)
Dept: FAMILY MEDICINE | Facility: CLINIC | Age: 84
End: 2024-07-08
Payer: COMMERCIAL

## 2024-07-08 VITALS
OXYGEN SATURATION: 97 % | HEART RATE: 87 BPM | RESPIRATION RATE: 18 BRPM | TEMPERATURE: 97.5 F | HEIGHT: 66 IN | BODY MASS INDEX: 23.7 KG/M2 | SYSTOLIC BLOOD PRESSURE: 112 MMHG | DIASTOLIC BLOOD PRESSURE: 72 MMHG | WEIGHT: 147.5 LBS

## 2024-07-08 DIAGNOSIS — F40.240 CLAUSTROPHOBIA: ICD-10-CM

## 2024-07-08 DIAGNOSIS — N20.0 NEPHROLITHIASIS: ICD-10-CM

## 2024-07-08 DIAGNOSIS — K86.89 PANCREATIC MASS: Primary | ICD-10-CM

## 2024-07-08 DIAGNOSIS — S22.43XD MULTIPLE CLOSED FRACTURES OF RIBS OF BOTH SIDES WITH ROUTINE HEALING, SUBSEQUENT ENCOUNTER: ICD-10-CM

## 2024-07-08 PROCEDURE — 99213 OFFICE O/P EST LOW 20 MIN: CPT | Performed by: FAMILY MEDICINE

## 2024-07-08 PROCEDURE — G2211 COMPLEX E/M VISIT ADD ON: HCPCS | Performed by: FAMILY MEDICINE

## 2024-07-08 RX ORDER — ACETAMINOPHEN 500 MG
500-1000 TABLET ORAL EVERY 6 HOURS PRN
COMMUNITY

## 2024-07-08 RX ORDER — DIAZEPAM 5 MG
TABLET ORAL
Qty: 2 TABLET | Refills: 0 | Status: SHIPPED | OUTPATIENT
Start: 2024-07-08 | End: 2024-08-22

## 2024-07-08 NOTE — ASSESSMENT & PLAN NOTE
The patient was found to have a 13 mm kidney stone incidentally on recent CT scan.  I did place a referral for consultation with urology in order to discuss treatment options.

## 2024-07-08 NOTE — PROGRESS NOTES
"  Assessment & Plan   Problem List Items Addressed This Visit       Nephrolithiasis     The patient was found to have a 13 mm kidney stone incidentally on recent CT scan.  I did place a referral for consultation with urology in order to discuss treatment options.         Relevant Orders    Adult Urology  Referral    Pancreatic mass - Primary     The patient was found to have a 2.2 cm cystic mass in her pancreas on a recent CT scan.  A referral was placed for the recommended MRI she was given a prescription for Valium due to concerns about claustrophobia.         Relevant Orders    MR Pancreas wo & w Contrast     Other Visit Diagnoses       Claustrophobia        Relevant Medications    diazepam (VALIUM) 5 MG tablet    Multiple closed fractures of ribs of both sides with routine healing, subsequent encounter        Relevant Medications    acetaminophen (TYLENOL) 500 MG tablet                MED REC REQUIRED  Post Medication Reconciliation Status: discharge medications reconciled, continue medications without change        Rowena Acuna is a 84 year old who presents today for follow-up from a recent emergency room visit.  The patient fell at home after catching her foot on a chair and fractured several ribs.  She comes in today stating that Tylenol 1000 mg taken up to 3 times a day has been effective at managing her pain and that she has very minimal pain at this time.  However, she did have a couple of incidental findings including a cystic pancreatic mass and needs follow-up on those.        Objective    Ht 1.676 m (5' 6\")   Wt 66.9 kg (147 lb 8 oz)   BMI 23.81 kg/m    Body mass index is 23.81 kg/m .  Physical Exam   GENERAL: alert and no distress            Signed Electronically by: TANIKA YU MD    "

## 2024-07-08 NOTE — ASSESSMENT & PLAN NOTE
The patient was found to have a 2.2 cm cystic mass in her pancreas on a recent CT scan.  A referral was placed for the recommended MRI she was given a prescription for Valium due to concerns about claustrophobia.

## 2024-07-11 ENCOUNTER — TRANSFERRED RECORDS (OUTPATIENT)
Dept: HEALTH INFORMATION MANAGEMENT | Facility: CLINIC | Age: 84
End: 2024-07-11
Payer: COMMERCIAL

## 2024-07-19 ENCOUNTER — TRANSFERRED RECORDS (OUTPATIENT)
Dept: HEALTH INFORMATION MANAGEMENT | Facility: CLINIC | Age: 84
End: 2024-07-19
Payer: COMMERCIAL

## 2024-07-25 ENCOUNTER — MYC MEDICAL ADVICE (OUTPATIENT)
Dept: UROLOGY | Facility: CLINIC | Age: 84
End: 2024-07-25
Payer: COMMERCIAL

## 2024-07-29 ENCOUNTER — HOSPITAL ENCOUNTER (OUTPATIENT)
Dept: MRI IMAGING | Facility: CLINIC | Age: 84
Discharge: HOME | End: 2024-07-29
Attending: FAMILY MEDICINE | Admitting: FAMILY MEDICINE
Payer: COMMERCIAL

## 2024-07-29 PROCEDURE — 255N000002 HC RX 255 OP 636: Performed by: FAMILY MEDICINE

## 2024-07-29 PROCEDURE — 74183 MRI ABD W/O CNTR FLWD CNTR: CPT

## 2024-07-29 PROCEDURE — A9585 GADOBUTROL INJECTION: HCPCS | Performed by: FAMILY MEDICINE

## 2024-07-29 RX ORDER — GADOBUTROL 604.72 MG/ML
6.5 INJECTION INTRAVENOUS ONCE
Status: COMPLETED | OUTPATIENT
Start: 2024-07-29 | End: 2024-07-29

## 2024-07-29 RX ADMIN — GADOBUTROL 6.5 ML: 604.72 INJECTION INTRAVENOUS at 11:11

## 2024-07-30 ENCOUNTER — TELEPHONE (OUTPATIENT)
Dept: FAMILY MEDICINE | Facility: CLINIC | Age: 84
End: 2024-07-30

## 2024-07-30 ENCOUNTER — VIRTUAL VISIT (OUTPATIENT)
Dept: UROLOGY | Facility: CLINIC | Age: 84
End: 2024-07-30
Attending: FAMILY MEDICINE
Payer: COMMERCIAL

## 2024-07-30 VITALS
WEIGHT: 144 LBS | DIASTOLIC BLOOD PRESSURE: 80 MMHG | HEIGHT: 66 IN | BODY MASS INDEX: 23.14 KG/M2 | SYSTOLIC BLOOD PRESSURE: 123 MMHG

## 2024-07-30 DIAGNOSIS — N20.0 CALCULUS OF LEFT KIDNEY: Primary | ICD-10-CM

## 2024-07-30 DIAGNOSIS — N20.0 NEPHROLITHIASIS: ICD-10-CM

## 2024-07-30 PROCEDURE — 99203 OFFICE O/P NEW LOW 30 MIN: CPT | Mod: 95 | Performed by: NURSE PRACTITIONER

## 2024-07-30 ASSESSMENT — PAIN SCALES - GENERAL: PAINLEVEL: NO PAIN (0)

## 2024-07-30 NOTE — TELEPHONE ENCOUNTER
Reason for Call:  Appointment Request    Patient requesting this type of appt: Chronic Diease Management/Medication/Follow-Up    Requested provider: Mela López    Reason patient unable to be scheduled: Not within requested timeframe    When does patient want to be seen/preferred time: 1-2 days    Comments: Pt calling would like to discuss lesion on pancreas and kidney stones w/ pcp. Offered pt an appointment (virtual) pt declined and stated she would like a call back for sooner appt. Pls call and advise, thanks.     Could we send this information to you in [place] or would you prefer to receive a phone call?:   Patient would prefer a phone call   Okay to leave a detailed message?: Yes at Cell number on file:    Telephone Information:   Mobile [phone]       Call taken on 7/30/2024 at 3:06 PM by Diya Armendariz

## 2024-07-30 NOTE — PROGRESS NOTES
Urology Video Office Visit    Video-Visit Details    Type of service:  Video Visit    Video Start Time: 1427    Video End Time: 1453    Originating Location (pt. Location): Home    Distant Location (provider location):  Off-site     Platform used for Video Visit: DwayneYorder           Assessment and Plan:     Assessment:84 year old female with a large left 14.5mm x 8.8mm lower pole stone.     Plan:  -Reviewed CT scan with patient. Noted large left nonobstructing lower pole stone.   -We discussed treatment options including observation vs. ureteroscopy and laser lithotripsy. I counseled the patient regarding the potential need for a ureteral stent after treatment and the necessity of removing the stent after surgery. I also discussed the possibility of additional procedures to render the patient stone free.  Discussed concern of size of stone and inability for spontaneous passage.  -She would like to consult with Dr. López and notify Urology via phone or MyChart of decision.   -If opting to observe, would recommend CT scan in 6 months.   -If having severe flank pain, fevers, chills, nausea, or vomiting please notify Urology clinic or be seen in the ER.     Silvia Shaikh CNP  Department of Urology  July 30, 2024    I spent a total of 35 minutes spent on the date of the encounter doing chart review, history and exam, documentation, and further activities as noted above.          Chief Complaint:   Left renal stone         History of Present Illness:    Kalpana Manzo is a pleasant 84 year old female who presents with concerns of a left renal stone.    Ms. Manzo had a CT scan on 7/3/24 due to a fall. CT scan incidentally noted a large left lower pole renal stone.     CT scan on 7/3/24 (images personally reviewed) revealed a nonobstructing left  14.5mm x 8.8mm lower pole stone without hydronephrosis. No noted right hydronephrosis or nephrolithiasis.     She is currently asymptomatic and was asymptomatic prior to her  CT scan. Denies any history of gross hematuria or concerns of UTI's.     This is her first stone. Denies any family history of nephrolithiasis.     Stone Risk Factors: none    She and her  do winter in [place]. They leave in Nov 2024.          Past Medical History:     Past Medical History:   Diagnosis Date    Basal cell carcinoma of skin     Created by Conversion  Replacement Utility updated for latest IMO load    Contact dermatitis and other eczema, due to unspecified cause     Created by Conversion     Dermatophytosis of nail     Created by Conversion     Disorder of bone and cartilage     Created by Conversion  Replacement Utility updated for latest IMO load    Hepatitis     Created by Conversion [facility] Annotation: Aug 22 2011  9:09AM - Mela López: in the 1980's  unspecified  Replacement Utility updated for latest IMO load    Hyperlipidemia     Created by Conversion     Nocturia     Created by Conversion     Osteoarthrosis involving lower leg     Created by Conversion  Replacement Utility updated for latest IMO load    Prediabetes 7/18/2016    Swelling of limb     Created by Conversion             Past Surgical History:     Past Surgical History:   Procedure Laterality Date    BIOPSY BREAST Left 1987    BIOPSY BREAST Right 1992    BIOPSY OF BREAST, INCISIONAL      Description: Biopsy Breast Open;  Recorded: 05/19/2008;     REMOVE TONSILS/ADENOIDS,<11 Y/O      Description: Tonsillectomy With Adenoidectomy;  Recorded: 05/19/2008;    REVISE SECONDARY VARICOSITY      Description: Varicose Vein Ligation;  Recorded: 05/19/2008;    TOTAL KNEE ARTHROPLASTY Right 08/2008    [facility] APPENDECTOMY      Description: Appendectomy;  Recorded: 05/19/2008;            Medications     Current Outpatient Medications   Medication Sig Dispense Refill    acetaminophen (TYLENOL) 500 MG tablet Take 500-1,000 mg by mouth every 6 hours as needed for mild pain      apixaban ANTICOAGULANT (ELIQUIS) 5 MG tablet Take 1 tablet  (5 mg) by mouth 2 times daily 180 tablet 3    cholecalciferol, vitamin D3, (VITAMIN D3) 2,000 unit Tab Take 1,000 Units by mouth daily      diazepam (VALIUM) 5 MG tablet Take 1 PO one hour prior to MRI; OK to repeat X 1 if needed 2 tablet 0    GLUCOSAM HCL/CHONDRO MONTES A/C/[place] (GLUCOSAMINE-CHONDROITIN COMPLX ORAL) [GLUCOSAM HCL/CHONDRO MONTES A/C/MN (GLUCOSAMINE-CHONDROITIN COMPLX ORAL)] Take 1 tablet by mouth daily. Tablet      hydrochlorothiazide (HYDRODIURIL) 12.5 MG tablet TAKE 1 TABLET(12.5 MG) BY MOUTH DAILY 90 tablet 3    multivitamin capsule [MULTIVITAMIN CAPSULE] Take 1 capsule by mouth daily.      simvastatin (ZOCOR) 10 MG tablet TAKE 1 TABLET (10 MG) BY MOUTH DAILY. 90 tablet 1     No current facility-administered medications for this visit.            Family History:     Family History   Problem Relation Age of Onset    Varicose Veins Mother             Social History:     Social History     Socioeconomic History    Marital status:      Spouse name: Not on file    Number of children: Not on file    Years of education: Not on file    Highest education level: Not on file   Occupational History    Not on file   Tobacco Use    Smoking status: Never    Smokeless tobacco: Never   Vaping Use    Vaping status: Never Used   Substance and Sexual Activity    Alcohol use: Yes     Comment: Alcoholic Drinks/day: rare    Drug use: No    Sexual activity: Not on file   Other Topics Concern    Not on file   Social History Narrative    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         Physical Exam:   General Appearance: Well groomed, hygenic  Eyes: No redness, discharge  Respiratory: No cough, no respiratory distress or labored breathing  Musculoskeletal: Grossly normal, full range of motion in upper extremities, no gross deficits  Skin: No discoloration or apparent rashes  Neurologic - No tremors  Psychiatric - Alert and oriented  The rest of a comprehensive physical examination is deferred due to video visit restrictions        Labs:    I personally reviewed all applicable laboratory data and went over findings with patient  Significant for:    CBC RESULTS:  Recent Labs   Lab Test 07/03/24  0801 06/16/22  0747 06/08/22  1636   WBC 6.0 5.6 5.3   HGB 13.7 13.5 13.8    162 169        BMP RESULTS:  Recent Labs   Lab Test 07/03/24  0801 09/18/23  1638 08/03/23  0939 05/22/23  0934 08/30/21  1010 07/06/20  0913 08/12/19  0842 08/06/18  0842    142 143 144   < > 143 145 144   POTASSIUM 3.5 3.7 3.7 4.0   < > 4.1 4.0 4.1   CHLORIDE 107 104 107 108*   < > 106 108* 108*   CO2 25 26 26 26   < > 27 31 27   ANIONGAP 11 12 10 10   < > 10 6 9   * 99 101* 86   < > 90 93 88   BUN 19.6 29.9* 24.8* 21.0   < > 23 19 19   CR 1.05* 1.10* 1.07* 0.92   < > 0.96 0.93 0.88   GFRESTIMATED 52* 50* 51* 61   < > 56* 58* >60   GFRESTBLACK  --   --   --   --   --  >60 >60 >60   DAVID 9.1 9.4 8.8 9.3   < > 9.1 9.8 9.6    < > = values in this interval not displayed.       UA RESULTS:   Recent Labs   Lab Test 10/13/23  1121 07/11/23  0915 06/22/23  1530   SG 1.020 1.015 1.020   URINEPH 7.0 6.0 6.5   NITRITE Negative Negative Negative   RBCU 2-5* 2-5* 5-10*   WBCU >100* 5-10* 10-25*       CALCIUM RESULTS  Lab Results   Component Value Date    DAVID 9.1 07/03/2024    DAVID 9.4 09/18/2023    DAVID 8.8 08/03/2023           Imaging:    I personally reviewed all applicable imaging and went over the below findings with patient.    Results for orders  placed or performed during the hospital encounter of 07/28/24   MR Pancreas wo & w Contrast    Narrative    EXAM: MR PANCREAS W/O and W CONTRAST  LOCATION: [facility]  DATE: 7/29/2024    INDICATION:  Pancreatic mass  COMPARISON: CT 7/3/2024  TECHNIQUE: Routine MRI pancreas protocol including T1 in/out phase, diffusion, multiplane T2. Dynamic T1 post IV contrast.   CONTRAST: 6.5mL Gadavist    FINDINGS:    PANCREAS: Corresponding to the abnormality seen on CT, there is a 1.9 x 2.7 x 2.3 cm multiseptated cystic lesion (series 7, image 22 and series 14, image 45) within the pancreatic tail with multiple internal septations and clear communication with the   pancreatic duct. Upstream to the lesion, there is mild pancreatic ductal dilatation up to 0.5 cm (series 14, image 79). Downstream to the lesion, no pancreatic ductal dilatation is present. There is no associated nodular enhancement. No peripancreatic   inflammation.    ADDITIONAL FINDINGS:   No morphologic changes of hepatic steatosis, iron deposition, or cirrhosis. No focal hepatic mass. Cholelithiasis. The biliary tree appears unremarkable without choledocholithiasis or abnormal periductal signal.    Unremarkable appearance of the spleen and adrenal glands. Tiny benign-appearing renal cysts for which no follow-up is recommended. No dilated loops of small bowel are present to suggest an obstruction.    Normal caliber abdominal aorta. Patent portal, splenic, and mesenteric veins. Patent bilateral renal veins. No intra-abdominal ascites. No intra-abdominal lymphadenopathy is identified by size criteria.      Impression    IMPRESSION:  1.  2.7 cm multiseptated cystic lesion within the pancreatic tail with multiple internal septations and communication with the pancreatic duct, which is borderline dilated. The borderline dilatation of the pancreatic duct would be considered a worrisome   feature based on Stephanie criteria. See below for  follow-up recommendations.  2.  Cholelithiasis.    REFERENCE:  Revisions of international consensus [place] guidelines for the management of IPMN of the pancreas. Pancreatology 2017;17(5):738-753.    All cysts with worrisome features (including those at least 30 mm): EUS

## 2024-07-30 NOTE — TELEPHONE ENCOUNTER
Left message to call back for: Patient  Information to relay to patient: Please help patient schedule office visit. OK to use reserved slots.

## 2024-07-30 NOTE — PATIENT INSTRUCTIONS
UROLOGY CLINIC VISIT PATIENT INSTRUCTIONS    -If having severe flank pain, fevers >101.0F, chills, nausea, or vomiting please notify Urology clinic or be seen in the ER.     -Please refer to the following link for information about Dr. Ruiz and to learn about the ureteroscopy and laser lithotripsy procedure:    [email or web address]    If you have any issues, questions or concerns in the meantime, do not hesitate to contact us at [facility] at [phone] or via [employer].     Silvia Shaikh CNP  Department of Urology

## 2024-07-30 NOTE — TELEPHONE ENCOUNTER
"\"IMPRESSION:  1.  2.7 cm multiseptated cystic lesion within the pancreatic tail with multiple internal septations and communication with the pancreatic duct, which is borderline dilated. The borderline dilatation of the pancreatic duct would be considered a worrisome   feature based on Stephanie criteria. See below for follow-up recommendations.  2.  Cholelithiasis\"  "

## 2024-07-30 NOTE — TELEPHONE ENCOUNTER
Patient returned call. Patient declined appointment at this time. She would like to wait for Dr. López to review results and make recommendations.   HTN (hypertension) AAA (abdominal aortic aneurysm)

## 2024-07-30 NOTE — TELEPHONE ENCOUNTER
Test Results    Contacts       Contact Date/Time Type Contact Phone/Fax    07/30/2024 03:32 PM CDT Phone (Incoming) Kalpana Manzo (Self) [phone] (M)            Who ordered the test:  Dr. López    Type of test: MRI Pancreas    Date of test:  7/29/24    Where was the test performed:  [facility]    What are your questions/concerns?:  Patient has reviewed results in [employer] and is concerned with findings. Requesting call back to discuss.    Could we send this information to you in [employer] or would you prefer to receive a phone call?:   Patient would prefer a phone call   Okay to leave a detailed message?: Yes at Cell number on file:    Telephone Information:   Mobile [phone]

## 2024-07-30 NOTE — NURSING NOTE
Current patient location: [address] [place] [id number]    Is the patient currently in the state of [place]? YES    Visit mode:VIDEO    If the visit is dropped, the patient can be reconnected by: VIDEO VISIT: Text to cell phone:   Telephone Information:   Mobile [phone]       Will anyone else be joining the visit? NO  (If patient encounters technical issues they should call [phone])    How would you like to obtain your AVS? MyChart    Are changes needed to the allergy or medication list? Pt stated no changes to allergies and Pt stated no med changes    Are refills needed on medications prescribed by this physician?     Reason for visit: Consult    Patricia PERSON

## 2024-07-31 ENCOUNTER — TELEPHONE (OUTPATIENT)
Dept: FAMILY MEDICINE | Facility: CLINIC | Age: 84
End: 2024-07-31
Payer: COMMERCIAL

## 2024-07-31 DIAGNOSIS — K86.89 PANCREATIC MASS: Primary | ICD-10-CM

## 2024-07-31 NOTE — TELEPHONE ENCOUNTER
Test Results        Who ordered the test:      Type of test: MRI    Date of test:  7/29    Where was the test performed:  [facility]    What are your questions/concerns?:  Patient requesting results and advice.    Could we send this information to you in [facility] [employer]t or would you prefer to receive a phone call?:   Patient would prefer a phone call   Okay to leave a detailed message?: Yes at Cell number on file:    Telephone Information:   Mobile [phone]

## 2024-07-31 NOTE — TELEPHONE ENCOUNTER
Spoke with [facility] Referral Coordinator. EUS order, clinical notes and previous imaging results are reviewed by MD and then offered appointment based on their recommendation. She reports these are usually scheduled within 1 week of MD review at [place] or [place] locations.    Writer has faxed EUS order and records to [facility]. Confirmation received.    Please advise if this is an acceptable timeframe for procedure.

## 2024-07-31 NOTE — TELEPHONE ENCOUNTER
Yes, this is perfect!  Please let patient know regarding this communication either via MyChart or phone call.  Thank you so much

## 2024-07-31 NOTE — TELEPHONE ENCOUNTER
Spoke with patient. Scheduling information provided as documented below.    Patient instructed to contact clinic if she does not receive call from [facility] by early next week.

## 2024-07-31 NOTE — TELEPHONE ENCOUNTER
Spoke with patient and she would like some assistance and calling over to [facility] to find out how long it will take before she can get this done.  Can we call over to [facility] and find out what can a timeframe we are looking at and where the procedure would be done?

## 2024-08-01 ENCOUNTER — TELEPHONE (OUTPATIENT)
Dept: FAMILY MEDICINE | Facility: CLINIC | Age: 84
End: 2024-08-01
Payer: COMMERCIAL

## 2024-08-01 NOTE — TELEPHONE ENCOUNTER
Reason for Call:  Appointment Request    Patient requesting this type of appt: Pre-op    Requested provider: Mela López    Reason patient unable to be scheduled: Not within requested timeframe    When does patient want to be seen/preferred time:  Before 08/30/24    Comments:     Could we send this information to you in [facility] or would you prefer to receive a phone call?:   Patient would prefer a phone call   Okay to leave a detailed message?: Yes at Cell number on file:    Telephone Information:   Mobile [phone]       Call taken on 8/1/2024 at 3:45 PM by Jossy James

## 2024-08-01 NOTE — TELEPHONE ENCOUNTER
Left message to call back for: Patient  Information to relay to patient: Please help patient schedule. OK to use reserved slots 40-min in total.

## 2024-08-02 ENCOUNTER — HOSPITAL ENCOUNTER (OUTPATIENT)
Facility: HOSPITAL | Age: 84
End: 2024-08-02
Attending: INTERNAL MEDICINE | Admitting: INTERNAL MEDICINE
Payer: COMMERCIAL

## 2024-08-02 ENCOUNTER — TELEPHONE (OUTPATIENT)
Dept: FAMILY MEDICINE | Facility: CLINIC | Age: 84
End: 2024-08-02
Payer: COMMERCIAL

## 2024-08-02 NOTE — TELEPHONE ENCOUNTER
Order/Referral Request    Who is requesting: Annette PCA form [facility]    Orders being requested: Hold Eliquis 3 days prior to an EUS scheduled on 8/30/24  If PCP does not agree with hold Creat lab needed within 90 days    Reason service is needed/diagnosis: Procedure EUS at [facility]    When are orders needed by: ASAP- hold needs to begin 8/27/24    Has this been discussed with Provider: No    Does patient have a preference on a Group/Provider/Facility? [facility]    Does patient have an appointment scheduled?: No    Where to send orders:  verbal orders to [facility] needed- can call secure nurse line at [phone]    Could we send this information to you in [facility] or would you prefer to receive a phone call?:   Patient would prefer a phone call   Okay to leave a detailed message?: Yes at Cell number on file:    Telephone Information:   Mobile [phone]

## 2024-08-14 ENCOUNTER — HOSPITAL ENCOUNTER (OUTPATIENT)
Dept: MAMMOGRAPHY | Facility: CLINIC | Age: 84
Discharge: HOME | End: 2024-08-14
Admitting: FAMILY MEDICINE
Payer: COMMERCIAL

## 2024-08-14 DIAGNOSIS — Z12.31 VISIT FOR SCREENING MAMMOGRAM: ICD-10-CM

## 2024-08-14 PROCEDURE — 77063 BREAST TOMOSYNTHESIS BI: CPT

## 2024-08-22 ENCOUNTER — APPOINTMENT (OUTPATIENT)
Dept: RADIOLOGY | Facility: CLINIC | Age: 84
DRG: 871 | End: 2024-08-22
Attending: HOSPITALIST
Payer: COMMERCIAL

## 2024-08-22 ENCOUNTER — APPOINTMENT (OUTPATIENT)
Dept: RADIOLOGY | Facility: CLINIC | Age: 84
DRG: 871 | End: 2024-08-22
Attending: EMERGENCY MEDICINE
Payer: COMMERCIAL

## 2024-08-22 ENCOUNTER — APPOINTMENT (OUTPATIENT)
Dept: CT IMAGING | Facility: CLINIC | Age: 84
DRG: 871 | End: 2024-08-22
Attending: EMERGENCY MEDICINE
Payer: COMMERCIAL

## 2024-08-22 ENCOUNTER — HOSPITAL ENCOUNTER (INPATIENT)
Facility: CLINIC | Age: 84
LOS: 2 days | Discharge: HOME | DRG: 871 | End: 2024-08-24
Attending: EMERGENCY MEDICINE | Admitting: HOSPITALIST
Payer: COMMERCIAL

## 2024-08-22 ENCOUNTER — OFFICE VISIT (OUTPATIENT)
Dept: FAMILY MEDICINE | Facility: CLINIC | Age: 84
End: 2024-08-22
Payer: COMMERCIAL

## 2024-08-22 VITALS
TEMPERATURE: 97.9 F | OXYGEN SATURATION: 98 % | HEART RATE: 93 BPM | RESPIRATION RATE: 12 BRPM | WEIGHT: 142.6 LBS | HEIGHT: 66 IN | DIASTOLIC BLOOD PRESSURE: 56 MMHG | SYSTOLIC BLOOD PRESSURE: 88 MMHG | BODY MASS INDEX: 22.92 KG/M2

## 2024-08-22 DIAGNOSIS — N18.31 CHRONIC KIDNEY DISEASE, STAGE 3A (H): ICD-10-CM

## 2024-08-22 DIAGNOSIS — R65.20 SEVERE SEPSIS (H): ICD-10-CM

## 2024-08-22 DIAGNOSIS — R65.21 SEPTIC SHOCK (H): ICD-10-CM

## 2024-08-22 DIAGNOSIS — N20.0 NEPHROLITHIASIS: Primary | ICD-10-CM

## 2024-08-22 DIAGNOSIS — A41.9 SEPTIC SHOCK (H): ICD-10-CM

## 2024-08-22 DIAGNOSIS — K86.89 PANCREATIC MASS: ICD-10-CM

## 2024-08-22 DIAGNOSIS — A41.9 SEVERE SEPSIS (H): ICD-10-CM

## 2024-08-22 DIAGNOSIS — Z01.818 PREOP GENERAL PHYSICAL EXAM: Primary | ICD-10-CM

## 2024-08-22 DIAGNOSIS — I10 BENIGN ESSENTIAL HYPERTENSION: ICD-10-CM

## 2024-08-22 DIAGNOSIS — R53.83 FATIGUE, UNSPECIFIED TYPE: ICD-10-CM

## 2024-08-22 DIAGNOSIS — R53.1 WEAKNESS: ICD-10-CM

## 2024-08-22 DIAGNOSIS — N39.0 UTI (URINARY TRACT INFECTION): ICD-10-CM

## 2024-08-22 DIAGNOSIS — D72.829 LEUKOCYTOSIS, UNSPECIFIED TYPE: ICD-10-CM

## 2024-08-22 DIAGNOSIS — N17.9 AKI (ACUTE KIDNEY INJURY) (H): ICD-10-CM

## 2024-08-22 LAB
ALBUMIN SERPL BCG-MCNC: 3.6 G/DL (ref 3.5–5.2)
ALBUMIN SERPL BCG-MCNC: 4 G/DL (ref 3.5–5.2)
ALBUMIN UR-MCNC: 300 MG/DL
ALP SERPL-CCNC: 105 U/L (ref 40–150)
ALP SERPL-CCNC: 90 U/L (ref 40–150)
ALT SERPL W P-5'-P-CCNC: 38 U/L (ref 0–50)
ALT SERPL W P-5'-P-CCNC: 41 U/L (ref 0–50)
ANION GAP SERPL CALCULATED.3IONS-SCNC: 16 MMOL/L (ref 7–15)
ANION GAP SERPL CALCULATED.3IONS-SCNC: 18 MMOL/L (ref 7–15)
APPEARANCE UR: ABNORMAL
AST SERPL W P-5'-P-CCNC: 46 U/L (ref 0–45)
AST SERPL W P-5'-P-CCNC: 51 U/L (ref 0–45)
ATRIAL RATE - MUSE: 85 BPM
BACTERIA #/AREA URNS HPF: ABNORMAL /HPF
BASOPHILS # BLD AUTO: 0 10E3/UL (ref 0–0.2)
BASOPHILS NFR BLD AUTO: 0 %
BILIRUB SERPL-MCNC: 1.2 MG/DL
BILIRUB SERPL-MCNC: 1.4 MG/DL
BILIRUB UR QL STRIP: NEGATIVE
BUN SERPL-MCNC: 44.4 MG/DL (ref 8–23)
BUN SERPL-MCNC: 46.6 MG/DL (ref 8–23)
CALCIUM SERPL-MCNC: 9 MG/DL (ref 8.8–10.4)
CALCIUM SERPL-MCNC: 9 MG/DL (ref 8.8–10.4)
CHLORIDE SERPL-SCNC: 101 MMOL/L (ref 98–107)
CHLORIDE SERPL-SCNC: 97 MMOL/L (ref 98–107)
COLOR UR AUTO: ABNORMAL
CREAT SERPL-MCNC: 2.43 MG/DL (ref 0.51–0.95)
CREAT SERPL-MCNC: 2.48 MG/DL (ref 0.51–0.95)
DIASTOLIC BLOOD PRESSURE - MUSE: NORMAL MMHG
EGFRCR SERPLBLD CKD-EPI 2021: 19 ML/MIN/1.73M2
EGFRCR SERPLBLD CKD-EPI 2021: 19 ML/MIN/1.73M2
EOSINOPHIL # BLD AUTO: 0 10E3/UL (ref 0–0.7)
EOSINOPHIL NFR BLD AUTO: 0 %
ERYTHROCYTE [DISTWIDTH] IN BLOOD BY AUTOMATED COUNT: 12.7 % (ref 10–15)
ERYTHROCYTE [DISTWIDTH] IN BLOOD BY AUTOMATED COUNT: 12.8 % (ref 10–15)
GLUCOSE SERPL-MCNC: 137 MG/DL (ref 70–99)
GLUCOSE SERPL-MCNC: 199 MG/DL (ref 70–99)
GLUCOSE UR STRIP-MCNC: NEGATIVE MG/DL
GRANULAR CAST: 6 /LPF
HCO3 SERPL-SCNC: 23 MMOL/L (ref 22–29)
HCO3 SERPL-SCNC: 23 MMOL/L (ref 22–29)
HCT VFR BLD AUTO: 38.9 % (ref 35–47)
HCT VFR BLD AUTO: 41.2 % (ref 35–47)
HGB BLD-MCNC: 13.1 G/DL (ref 11.7–15.7)
HGB BLD-MCNC: 13.9 G/DL (ref 11.7–15.7)
HGB UR QL STRIP: ABNORMAL
HOLD SPECIMEN: NORMAL
HYALINE CASTS: 38 /LPF
IMM GRANULOCYTES # BLD: 0.1 10E3/UL
IMM GRANULOCYTES NFR BLD: 0 %
INTERPRETATION ECG - MUSE: NORMAL
KETONES UR STRIP-MCNC: NEGATIVE MG/DL
LACTATE SERPL-SCNC: 1.4 MMOL/L (ref 0.7–2)
LACTATE SERPL-SCNC: 2.2 MMOL/L (ref 0.7–2)
LACTATE SERPL-SCNC: 4.9 MMOL/L (ref 0.7–2)
LEUKOCYTE ESTERASE UR QL STRIP: ABNORMAL
LIPASE SERPL-CCNC: 22 U/L (ref 13–60)
LIPASE SERPL-CCNC: 22 U/L (ref 13–60)
LYMPHOCYTES # BLD AUTO: 1.4 10E3/UL (ref 0.8–5.3)
LYMPHOCYTES NFR BLD AUTO: 8 %
MAGNESIUM SERPL-MCNC: 2.5 MG/DL (ref 1.7–2.3)
MCH RBC QN AUTO: 30.8 PG (ref 26.5–33)
MCH RBC QN AUTO: 31.3 PG (ref 26.5–33)
MCHC RBC AUTO-ENTMCNC: 33.7 G/DL (ref 31.5–36.5)
MCHC RBC AUTO-ENTMCNC: 33.7 G/DL (ref 31.5–36.5)
MCV RBC AUTO: 91 FL (ref 78–100)
MCV RBC AUTO: 93 FL (ref 78–100)
MONOCYTES # BLD AUTO: 1.2 10E3/UL (ref 0–1.3)
MONOCYTES NFR BLD AUTO: 7 %
MUCOUS THREADS #/AREA URNS LPF: PRESENT /LPF
NEUTROPHILS # BLD AUTO: 13.5 10E3/UL (ref 1.6–8.3)
NEUTROPHILS NFR BLD AUTO: 84 %
NITRATE UR QL: POSITIVE
NRBC # BLD AUTO: 0 10E3/UL
NRBC BLD AUTO-RTO: 0 /100
P AXIS - MUSE: 53 DEGREES
PH UR STRIP: 6 [PH] (ref 5–7)
PLATELET # BLD AUTO: 161 10E3/UL (ref 150–450)
PLATELET # BLD AUTO: 181 10E3/UL (ref 150–450)
POTASSIUM SERPL-SCNC: 3.4 MMOL/L (ref 3.4–5.3)
POTASSIUM SERPL-SCNC: 3.5 MMOL/L (ref 3.4–5.3)
PR INTERVAL - MUSE: 130 MS
PROCALCITONIN SERPL IA-MCNC: 3.7 NG/ML
PROT SERPL-MCNC: 6.9 G/DL (ref 6.4–8.3)
PROT SERPL-MCNC: 7.7 G/DL (ref 6.4–8.3)
QRS DURATION - MUSE: 80 MS
QT - MUSE: 358 MS
QTC - MUSE: 426 MS
R AXIS - MUSE: 21 DEGREES
RBC # BLD AUTO: 4.18 10E6/UL (ref 3.8–5.2)
RBC # BLD AUTO: 4.51 10E6/UL (ref 3.8–5.2)
RBC URINE: >182 /HPF
SODIUM SERPL-SCNC: 138 MMOL/L (ref 135–145)
SODIUM SERPL-SCNC: 140 MMOL/L (ref 135–145)
SP GR UR STRIP: 1.01 (ref 1–1.03)
SQUAMOUS EPITHELIAL: 4 /HPF
SYSTOLIC BLOOD PRESSURE - MUSE: NORMAL MMHG
T AXIS - MUSE: 18 DEGREES
TRANSITIONAL EPI: <1 /HPF
TSH SERPL DL<=0.005 MIU/L-ACNC: 3.13 UIU/ML (ref 0.3–4.2)
UROBILINOGEN UR STRIP-MCNC: <2 MG/DL
VENTRICULAR RATE- MUSE: 85 BPM
WBC # BLD AUTO: 15.3 10E3/UL (ref 4–11)
WBC # BLD AUTO: 16.1 10E3/UL (ref 4–11)
WBC CLUMPS #/AREA URNS HPF: PRESENT /HPF
WBC URINE: >182 /HPF

## 2024-08-22 PROCEDURE — 258N000003 HC RX IP 258 OP 636

## 2024-08-22 PROCEDURE — 83690 ASSAY OF LIPASE: CPT | Performed by: EMERGENCY MEDICINE

## 2024-08-22 PROCEDURE — 83605 ASSAY OF LACTIC ACID: CPT | Performed by: EMERGENCY MEDICINE

## 2024-08-22 PROCEDURE — 999N000065 XR CHEST PORT 1 VIEW

## 2024-08-22 PROCEDURE — 84145 PROCALCITONIN (PCT): CPT

## 2024-08-22 PROCEDURE — G2211 COMPLEX E/M VISIT ADD ON: HCPCS | Performed by: FAMILY MEDICINE

## 2024-08-22 PROCEDURE — 83735 ASSAY OF MAGNESIUM: CPT | Performed by: EMERGENCY MEDICINE

## 2024-08-22 PROCEDURE — 99207 PR APP CREDIT; MD BILLING SHARED VISIT: CPT | Mod: FS

## 2024-08-22 PROCEDURE — 85048 AUTOMATED LEUKOCYTE COUNT: CPT | Performed by: EMERGENCY MEDICINE

## 2024-08-22 PROCEDURE — 81001 URINALYSIS AUTO W/SCOPE: CPT | Performed by: EMERGENCY MEDICINE

## 2024-08-22 PROCEDURE — 36415 COLL VENOUS BLD VENIPUNCTURE: CPT | Performed by: EMERGENCY MEDICINE

## 2024-08-22 PROCEDURE — 82040 ASSAY OF SERUM ALBUMIN: CPT | Performed by: EMERGENCY MEDICINE

## 2024-08-22 PROCEDURE — 36415 COLL VENOUS BLD VENIPUNCTURE: CPT | Performed by: FAMILY MEDICINE

## 2024-08-22 PROCEDURE — 83605 ASSAY OF LACTIC ACID: CPT

## 2024-08-22 PROCEDURE — 71046 X-RAY EXAM CHEST 2 VIEWS: CPT

## 2024-08-22 PROCEDURE — 99207 PR NO BILLABLE SERVICE THIS VISIT: CPT

## 2024-08-22 PROCEDURE — 87149 DNA/RNA DIRECT PROBE: CPT | Performed by: EMERGENCY MEDICINE

## 2024-08-22 PROCEDURE — 36569 INSJ PICC 5 YR+ W/O IMAGING: CPT

## 2024-08-22 PROCEDURE — 74176 CT ABD & PELVIS W/O CONTRAST: CPT

## 2024-08-22 PROCEDURE — 250N000011 HC RX IP 250 OP 636: Performed by: EMERGENCY MEDICINE

## 2024-08-22 PROCEDURE — 3E043XZ INTRODUCTION OF VASOPRESSOR INTO CENTRAL VEIN, PERCUTANEOUS APPROACH: ICD-10-PCS | Performed by: HOSPITALIST

## 2024-08-22 PROCEDURE — 250N000013 HC RX MED GY IP 250 OP 250 PS 637: Performed by: EMERGENCY MEDICINE

## 2024-08-22 PROCEDURE — 84450 TRANSFERASE (AST) (SGOT): CPT | Performed by: FAMILY MEDICINE

## 2024-08-22 PROCEDURE — 87086 URINE CULTURE/COLONY COUNT: CPT | Performed by: EMERGENCY MEDICINE

## 2024-08-22 PROCEDURE — 80048 BASIC METABOLIC PNL TOTAL CA: CPT | Performed by: FAMILY MEDICINE

## 2024-08-22 PROCEDURE — 96365 THER/PROPH/DIAG IV INF INIT: CPT

## 2024-08-22 PROCEDURE — 96361 HYDRATE IV INFUSION ADD-ON: CPT

## 2024-08-22 PROCEDURE — 84460 ALANINE AMINO (ALT) (SGPT): CPT | Performed by: FAMILY MEDICINE

## 2024-08-22 PROCEDURE — 99223 1ST HOSP IP/OBS HIGH 75: CPT | Mod: FS | Performed by: HOSPITALIST

## 2024-08-22 PROCEDURE — 83690 ASSAY OF LIPASE: CPT | Performed by: FAMILY MEDICINE

## 2024-08-22 PROCEDURE — 93005 ELECTROCARDIOGRAM TRACING: CPT | Performed by: EMERGENCY MEDICINE

## 2024-08-22 PROCEDURE — 200N000001 HC R&B ICU

## 2024-08-22 PROCEDURE — 84443 ASSAY THYROID STIM HORMONE: CPT | Performed by: FAMILY MEDICINE

## 2024-08-22 PROCEDURE — 82533 TOTAL CORTISOL: CPT

## 2024-08-22 PROCEDURE — 84075 ASSAY ALKALINE PHOSPHATASE: CPT | Performed by: FAMILY MEDICINE

## 2024-08-22 PROCEDURE — 85027 COMPLETE CBC AUTOMATED: CPT | Performed by: FAMILY MEDICINE

## 2024-08-22 PROCEDURE — 84155 ASSAY OF PROTEIN SERUM: CPT | Performed by: FAMILY MEDICINE

## 2024-08-22 PROCEDURE — 36415 COLL VENOUS BLD VENIPUNCTURE: CPT

## 2024-08-22 PROCEDURE — 87186 SC STD MICRODIL/AGAR DIL: CPT | Performed by: EMERGENCY MEDICINE

## 2024-08-22 PROCEDURE — 272N000452 HC KIT SHRLOCK 5FR POWER PICC TRIPLE LUMEN

## 2024-08-22 PROCEDURE — 99291 CRITICAL CARE FIRST HOUR: CPT | Mod: 25

## 2024-08-22 PROCEDURE — 85025 COMPLETE CBC W/AUTO DIFF WBC: CPT | Performed by: FAMILY MEDICINE

## 2024-08-22 PROCEDURE — 258N000003 HC RX IP 258 OP 636: Performed by: EMERGENCY MEDICINE

## 2024-08-22 PROCEDURE — 250N000009 HC RX 250

## 2024-08-22 PROCEDURE — 250N000011 HC RX IP 250 OP 636: Performed by: HOSPITALIST

## 2024-08-22 PROCEDURE — 82247 BILIRUBIN TOTAL: CPT | Performed by: FAMILY MEDICINE

## 2024-08-22 PROCEDURE — 99214 OFFICE O/P EST MOD 30 MIN: CPT | Performed by: FAMILY MEDICINE

## 2024-08-22 RX ORDER — CEFTRIAXONE 1 G/1
1 INJECTION, POWDER, FOR SOLUTION INTRAMUSCULAR; INTRAVENOUS ONCE
Status: COMPLETED | OUTPATIENT
Start: 2024-08-22 | End: 2024-08-22

## 2024-08-22 RX ORDER — ROPIVACAINE IN 0.9% SOD CHL/PF 0.1 %
.01-.125 PLASTIC BAG, INJECTION (ML) EPIDURAL CONTINUOUS
Status: DISCONTINUED | OUTPATIENT
Start: 2024-08-22 | End: 2024-08-23

## 2024-08-22 RX ORDER — SIMVASTATIN 10 MG
10 TABLET ORAL DAILY
Status: DISCONTINUED | OUTPATIENT
Start: 2024-08-22 | End: 2024-08-22

## 2024-08-22 RX ORDER — ACETAMINOPHEN 650 MG/1
650 SUPPOSITORY RECTAL EVERY 4 HOURS PRN
Status: DISCONTINUED | OUTPATIENT
Start: 2024-08-22 | End: 2024-08-24 | Stop reason: HOSPADM

## 2024-08-22 RX ORDER — SODIUM CHLORIDE 9 MG/ML
INJECTION, SOLUTION INTRAVENOUS CONTINUOUS
Status: DISCONTINUED | OUTPATIENT
Start: 2024-08-22 | End: 2024-08-22

## 2024-08-22 RX ORDER — SODIUM CHLORIDE 9 MG/ML
INJECTION, SOLUTION INTRAVENOUS CONTINUOUS
Status: DISCONTINUED | OUTPATIENT
Start: 2024-08-22 | End: 2024-08-23

## 2024-08-22 RX ORDER — ONDANSETRON 2 MG/ML
4 INJECTION INTRAMUSCULAR; INTRAVENOUS EVERY 6 HOURS PRN
Status: DISCONTINUED | OUTPATIENT
Start: 2024-08-22 | End: 2024-08-24 | Stop reason: HOSPADM

## 2024-08-22 RX ORDER — HYDROCHLOROTHIAZIDE 12.5 MG/1
12.5 TABLET ORAL DAILY
Status: ON HOLD | COMMUNITY
End: 2024-08-24

## 2024-08-22 RX ORDER — HYDROCHLOROTHIAZIDE 12.5 MG/1
12.5 TABLET ORAL DAILY
Status: DISCONTINUED | OUTPATIENT
Start: 2024-08-22 | End: 2024-08-22

## 2024-08-22 RX ORDER — AMOXICILLIN 250 MG
1 CAPSULE ORAL 2 TIMES DAILY PRN
Status: DISCONTINUED | OUTPATIENT
Start: 2024-08-22 | End: 2024-08-23

## 2024-08-22 RX ORDER — LIDOCAINE 40 MG/G
CREAM TOPICAL
Status: DISCONTINUED | OUTPATIENT
Start: 2024-08-22 | End: 2024-08-22

## 2024-08-22 RX ORDER — CALCIUM CARBONATE 500 MG/1
1000 TABLET, CHEWABLE ORAL 4 TIMES DAILY PRN
Status: DISCONTINUED | OUTPATIENT
Start: 2024-08-22 | End: 2024-08-24

## 2024-08-22 RX ORDER — LIDOCAINE 40 MG/G
CREAM TOPICAL
Status: DISCONTINUED | OUTPATIENT
Start: 2024-08-22 | End: 2024-08-24 | Stop reason: HOSPADM

## 2024-08-22 RX ORDER — POLYETHYLENE GLYCOL 3350 17 G/17G
17 POWDER, FOR SOLUTION ORAL 2 TIMES DAILY PRN
Status: DISCONTINUED | OUTPATIENT
Start: 2024-08-22 | End: 2024-08-23

## 2024-08-22 RX ORDER — ACETAMINOPHEN 325 MG/1
975 TABLET ORAL ONCE
Status: COMPLETED | OUTPATIENT
Start: 2024-08-22 | End: 2024-08-22

## 2024-08-22 RX ORDER — AMOXICILLIN 250 MG
2 CAPSULE ORAL 2 TIMES DAILY PRN
Status: DISCONTINUED | OUTPATIENT
Start: 2024-08-22 | End: 2024-08-23

## 2024-08-22 RX ORDER — CEFTRIAXONE 1 G/1
1 INJECTION, POWDER, FOR SOLUTION INTRAMUSCULAR; INTRAVENOUS EVERY 24 HOURS
Status: DISCONTINUED | OUTPATIENT
Start: 2024-08-23 | End: 2024-08-24

## 2024-08-22 RX ORDER — ACETAMINOPHEN 325 MG/1
650 TABLET ORAL EVERY 4 HOURS PRN
Status: DISCONTINUED | OUTPATIENT
Start: 2024-08-22 | End: 2024-08-24 | Stop reason: HOSPADM

## 2024-08-22 RX ORDER — ONDANSETRON 4 MG/1
4 TABLET, ORALLY DISINTEGRATING ORAL EVERY 6 HOURS PRN
Status: DISCONTINUED | OUTPATIENT
Start: 2024-08-22 | End: 2024-08-24 | Stop reason: HOSPADM

## 2024-08-22 RX ADMIN — PHENTOLAMINE MESYLATE 5 MG: 5 INJECTION, POWDER, FOR SOLUTION INTRAMUSCULAR; INTRAVENOUS at 23:38

## 2024-08-22 RX ADMIN — ACETAMINOPHEN 975 MG: 325 TABLET ORAL at 16:03

## 2024-08-22 RX ADMIN — NOREPINEPHRINE BITARTRATE 0.03 MCG/KG/MIN: 0.02 INJECTION, SOLUTION INTRAVENOUS at 20:11

## 2024-08-22 RX ADMIN — CEFTRIAXONE 1 G: 1 INJECTION, POWDER, FOR SOLUTION INTRAMUSCULAR; INTRAVENOUS at 14:00

## 2024-08-22 RX ADMIN — SODIUM CHLORIDE 1000 ML: 9 INJECTION, SOLUTION INTRAVENOUS at 12:47

## 2024-08-22 RX ADMIN — SODIUM CHLORIDE 1000 ML: 9 INJECTION, SOLUTION INTRAVENOUS at 15:26

## 2024-08-22 RX ADMIN — LIDOCAINE HYDROCHLORIDE 0.4 ML: 10 INJECTION, SOLUTION EPIDURAL; INFILTRATION; INTRACAUDAL; PERINEURAL at 23:00

## 2024-08-22 RX ADMIN — SODIUM CHLORIDE 500 ML: 9 INJECTION, SOLUTION INTRAVENOUS at 19:25

## 2024-08-22 RX ADMIN — SODIUM CHLORIDE: 9 INJECTION, SOLUTION INTRAVENOUS at 19:25

## 2024-08-22 RX ADMIN — SODIUM CHLORIDE 500 ML: 9 INJECTION, SOLUTION INTRAVENOUS at 17:06

## 2024-08-22 ASSESSMENT — ENCOUNTER SYMPTOMS
TINGLING: 0
LIGHT-HEADEDNESS: 1
FEVER: 0
SEIZURES: 0
WEAKNESS: 1
HEMOPTYSIS: 0
NAUSEA: 0
DYSURIA: 1
CONFUSION: 0
SPUTUM PRODUCTION: 0
WEAKNESS: 0
SORE THROAT: 0
HEARTBURN: 0
FREQUENCY: 1
DIZZINESS: 0
WEIGHT LOSS: 0
RHINORRHEA: 0
ORTHOPNEA: 0
CHILLS: 0
DIAPHORESIS: 0
ABDOMINAL PAIN: 0
DIARRHEA: 0
VOMITING: 0
PALPITATIONS: 0
SHORTNESS OF BREATH: 0
CHILLS: 1
DYSURIA: 0
HEMATURIA: 1
FLANK PAIN: 0
COUGH: 0
BLURRED VISION: 0
WHEEZING: 0
DEPRESSION: 0

## 2024-08-22 ASSESSMENT — ACTIVITIES OF DAILY LIVING (ADL)
ADLS_ACUITY_SCORE: 37
ADLS_ACUITY_SCORE: 35
ADLS_ACUITY_SCORE: 37
ADLS_ACUITY_SCORE: 42
ADLS_ACUITY_SCORE: 35
ADLS_ACUITY_SCORE: 42
ADLS_ACUITY_SCORE: 37

## 2024-08-22 NOTE — ED TRIAGE NOTES
Pt presents with light headedness and weakness for the last 5 days. Was at the clinic for a pre-op visit for a Pancreatic lesion and had was hypotensive at the clinic

## 2024-08-22 NOTE — ED PROVIDER NOTES
"Emergency Department Midlevel Supervisory Note     I had a face to face encounter with this patient seen by the Advanced Practice Provider (LIZZY). I personally made/approved the management plan and take responsibility for the patient management. I personally saw patient and performed a substantive portion of the visit including all aspects of the medical decision making.     ED Course:  12:52 PM Symone Rueda PA-C staffed patient with me. I agree with their assessment and plan of management, and I will see the patient.  1:25 PM.  I met with the patient to introduce myself, gather additional history, perform my initial exam, and discuss the plan.     Brief HPI:     Kalpana Manzo is a 84 year old female who presents for evaluation of generalized weakness. Patient was at preoperative appointment for a pancreatic lesion biopsy found 1 month ago during her previous ED visit for a fall. She was advised to report to ED after CBC revealed white count of 15.3. Endorses hematuria, lightheadedness, and fatigue.    I, Cathy Carroll, am serving as a scribe to document services personally performed by Constantin Vedruzco MD, based on my observations and the provider's statements to me.   I, Constantin Verduzco MD attest that Cathy Carroll was acting in a scribe capacity, has observed my performance of the services and has documented them in accordance with my direction.    Brief Physical Exam: /52   Pulse 89   Temp 98  F (36.7  C) (Temporal)   Resp 18   Ht 1.676 m (5' 6\")   Wt 64.7 kg (142 lb 10.2 oz)   SpO2 96%   BMI 23.02 kg/m    Constitutional:  Alert, in no acute distress  EYES: Conjunctivae clear  HENT:  Atraumatic  Respiratory:  Respirations even, unlabored, in no acute respiratory distress  Cardiovascular:  Regular rate and rhythm, good peripheral perfusion  GI: Soft, non-distended, non-tender  Musculoskeletal:  Moves all 4 extremities equally, grossly symmetrical strength  Integument: Warm & dry. No appreciable rash, " erythema.  Neurologic:  Alert & oriented, speech clear and fluent, no focal deficits noted  Psych: Normal mood and affect       MDM:  1:25 PM.  I met with the patient and performed a history and physical examination.  1:55 PM.  Lactate mildly elevated at 2.2.  White count elevated to 16,000.  BUN and creatinine are elevated.  Creatinine approximately twice what it was previously.  2:35 PM.  Patient will be signed out to the afternoon ED physician to follow-up on pending studies and PA supervision for admission.       Diagnostic impression:    1.  Acute hypotension.  2.  Acute renal failure.    Consults:      Labs and Imaging:  Results for orders placed or performed during the hospital encounter of 08/22/24   Lactic acid whole blood with 1x repeat in 2 hr when >2   Result Value Ref Range    Lactic Acid, Initial 2.2 (H) 0.7 - 2.0 mmol/L   CBC with platelets and differential   Result Value Ref Range    WBC Count 16.1 (H) 4.0 - 11.0 10e3/uL    RBC Count 4.51 3.80 - 5.20 10e6/uL    Hemoglobin 13.9 11.7 - 15.7 g/dL    Hematocrit 41.2 35.0 - 47.0 %    MCV 91 78 - 100 fL    MCH 30.8 26.5 - 33.0 pg    MCHC 33.7 31.5 - 36.5 g/dL    RDW 12.8 10.0 - 15.0 %    Platelet Count 181 150 - 450 10e3/uL    % Neutrophils 84 %    % Lymphocytes 8 %    % Monocytes 7 %    % Eosinophils 0 %    % Basophils 0 %    % Immature Granulocytes 0 %    NRBCs per 100 WBC 0 <1 /100    Absolute Neutrophils 13.5 (H) 1.6 - 8.3 10e3/uL    Absolute Lymphocytes 1.4 0.8 - 5.3 10e3/uL    Absolute Monocytes 1.2 0.0 - 1.3 10e3/uL    Absolute Eosinophils 0.0 0.0 - 0.7 10e3/uL    Absolute Basophils 0.0 0.0 - 0.2 10e3/uL    Absolute Immature Granulocytes 0.1 <=0.4 10e3/uL    Absolute NRBCs 0.0 10e3/uL       I have reviewed the relevant laboratory studies above.    I independently interpreted the following imaging study(s):     EKG: I reviewed and independently interpreted the patient's EKG, with comments made as listed below. Please see scanned EKG for full report.        Procedures:      Constantin Verduzco MD  [facility] EMERGENCY ROOM  [address] [id number] [phone]       Constantin Verduzco MD  08/22/24 1326       Constantin Verduzco MD  08/22/24 5990       Constantin Verduzco MD  08/22/24 1313

## 2024-08-22 NOTE — PLAN OF CARE
Sepsis protocol was triggered. Latic acid now 4.4 provider aware, bolus ordered. Still remains afebrile. Pt has the chills bear hugger provided. Perwic in place pt is just weak and not wanting to get up at this time. Did receive IV antibiotics. UA culture pending. Tylenol also given.   Nayeli Coker RN on 8/22/2024 at 4:22 PM   Problem: UTI (Urinary Tract Infection)  Goal: Improved Infection Symptoms  Outcome: Progressing   Goal Outcome Evaluation:

## 2024-08-22 NOTE — H&P
[facility]    History and Physical - Hospitalist Service       Date of Admission:  8/22/2024    Assessment & Plan      Kalpana Manzo is a 84 year old female admitted on 8/22/2024. 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 UA suggestive of acute UTI. Chest x ray negative for conolidation.  CT abdomen pelvis showing nonobstructing left and right renal calculus. Initial lactic acid 2.2 with repeat 4.9,  Blood cultures and urine cultures pending, she remains hemodynamically stable and afebrile on admission.  Admitted for further treatment of sepsis secondary to urinary tract infection. Urology consulted on admission due to bilateral renal calculi and gross hematuria     Sepsis 2/2 UTI   Right and left non obstructing renal calculi  -WBC 16.1, initial lactic 2.2 with repeat 4.9, UA suggestive of UTI with + nitrte, >182 WBS, >183 RBC  -CT abdomen pelvis: No definite acute abnormality, within the limitations of the noncontrast technique. nonobstructing 1.5 cm left renal calculus. There is also a nonobstructing 0.2 cm right renal calculus. No hydronephrosis. Cholelithiasis. Sigmoid colonic diverticulosis. No inflamed diverticuli.   -Blood and urine  cultures collected, results pending  -received 2L NS bolus in ED - sepsis bolus = 1,920 mL  -Continue sepsis workup including empiric IV antibiotics of rocephin, follow-up on cultures  -Recheck lactate Q2 hrs for lactate >2  -Continue supportive care including IV fluids, antiemetics, antipyretic and analgesia as needed  - AM CMP, CBC    Gross hematuria  -UA showing  >1 blood, RBC  >182  -Hemoglobin remained stable on admission 13.9  - hold PTA eliquis for now, resume as appropriate    ОЛЕГ on CKD stage 3a  -BUN 44, creatinine 2.48, EGFR 19, baseline GFR around 50  -Avoid NSAIDs and nephrotoxic agents  -Supplemental IV fluids initiated  -AM CMP    HTN   - hold PTA HCTZ    Chronic DVT   Left ventricular apical mural thrombus  -Patient following with cardiology outpatient, hold PTA Eliquis    Pancreatic mass   -CT abdomen: known cystic pancreatic lesion is not well visualized due to the noncontrast technique.   -has planned endoscopic ultrasound with biopsy on August 30    Multiple closed fractures of ribs on both sides with routine healing  -Patient sustained a mechanical fall in July where she fractured several ribs, taking Tylenol 1000 mg up to 3 times a day  -Continue with supportive care, fall precautions    HLD  - hold PTA statin           Diet: Combination Diet Regular Diet Adult  DVT Prophylaxis: Pneumatic Compression Devices  Rubio Catheter: Not present  Lines: None     Cardiac Monitoring: None  Code Status: Full Code    Clinically Significant Risk Factors Present on Admission               # Drug Induced Coagulation Defect: home medication list includes an anticoagulant medication   # Acute Kidney Injury, unspecified: based on a >150% or 0.3 mg/dL increase in last creatinine compared to past 90 day average, will monitor renal function  # Hypertension: Noted on problem list                          Disposition Plan     Medically Ready for Discharge: Anticipated in 2-4 Days         The patient's care was discussed with the Attending Physician, Dr. Rodríguez, the pt and pt family at bedside  .    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 she presented for preop evaluation for known pancreatic lesion with plans for endoscopic ultrasound with biopsy on August 30 however she noted chills since Sunday as well as the past 3 - 4 days dysuria, urinary frequency and hematuria with light pink-colored urine.  Denies any flank pain, abdominal pain, nausea, vomiting, diarrhea, lower extremity edema, chest pain or shortness of breath.  States she has been drinking ample water as well as has a good appetite however does feel more fatigued.  States she has had urinary tract infections in the past and has known kidney stones.     ED course: She presented afebrile temperature 98, heart rate 89, blood pressure 104/52 with oxygen of 96% on room air.  Significant labs on admission showing creatinine 2.48, GFR 19, anion gap 18, initial lactate 2.2 with repeat 4.9.  CBC showing WBC 16.0. UA suggestive of acute urinary tract infection as well as hematuria.  Chest x-ray was negative for acute consolidation, CT abdomen pelvis showing nonobstructing 1.5 cm left renal calculi and 0.2 cm right renal calculus with no hydronephrosis.She received 975 mg Tylenol, 1 g IV Rocephin and 2 L NS bolus.Blood cultures and urine cultures pending.    At the time of admission, She was A&O x4 in no acute distress on no supplemental O2.  She was denying any acute pain.  Discussed treatment plan with patient and her  at bedside and they were agreeable.  They confirm  she is full code.    Past Medical History    Past Medical History:   Diagnosis Date    Basal cell carcinoma of skin     Created by Conversion  Replacement Utility updated for latest IMO load    Contact dermatitis and other eczema, due to unspecified cause     Created by Conversion     Dermatophytosis of nail     Created by Conversion     Disorder of bone and cartilage     Created by Conversion  Replacement Utility updated for latest IMO load    Hepatitis     Created by Conversion [facility] Annotation: Aug 22 2011  9:09AM - Mela López: in the 1980's  unspecified  Replacement Utility updated for latest IMO load    Hyperlipidemia     Created by Conversion     Nocturia     Created by Conversion     Osteoarthrosis involving lower leg     Created by Conversion  Replacement Utility updated for latest IMO load    Prediabetes 7/18/2016    Swelling of limb     Created by Conversion        Past Surgical History   Past Surgical History:   Procedure Laterality Date    BIOPSY BREAST Left 1987    BIOPSY BREAST Right 1992    BIOPSY OF BREAST, INCISIONAL      Description: Biopsy Breast Open;  Recorded: 05/19/2008;    HC REMOVE TONSILS/ADENOIDS,<13 Y/O      Description: Tonsillectomy With Adenoidectomy;  Recorded: 05/19/2008;    REVISE SECONDARY VARICOSITY      Description: Varicose Vein Ligation;  Recorded: 05/19/2008;    TOTAL KNEE ARTHROPLASTY Right 08/2008    [facility] APPENDECTOMY      Description: Appendectomy;  Recorded: 05/19/2008;       Prior to Admission Medications   Prior to Admission Medications   Prescriptions Last Dose Informant Patient Reported? Taking?   GLUCOSAM HCL/CHONDRO MONTES A/C/MN (GLUCOSAMINE-CHONDROITIN COMPLX ORAL) 8/22/2024 at am  Yes Yes   Sig: [GLUCOSAM HCL/CHONDRO MONTES A/C/MN (GLUCOSAMINE-CHONDROITIN COMPLX ORAL)] Take 1 tablet by mouth daily. Tablet   acetaminophen (TYLENOL) 500 MG tablet 8/22/2024 at 500mg this morning.  Yes Yes   Sig: Take 500-1,000 mg by mouth every 6 hours as needed for mild pain    apixaban ANTICOAGULANT (ELIQUIS) 5 MG tablet 8/22/2024 at am  No Yes   Sig: Take 1 tablet (5 mg) by mouth 2 times daily   cholecalciferol, vitamin D3, (VITAMIN D3) 2,000 unit Tab 8/22/2024 at am  Yes Yes   Sig: Take 1,000 Units by mouth daily   hydroCHLOROthiazide 12.5 MG tablet 8/21/2024 at am  Yes Yes   Sig: Take 12.5 mg by mouth daily.   multivitamin capsule 8/22/2024 at am  Yes Yes   Sig: [MULTIVITAMIN CAPSULE] Take 1 capsule by mouth daily.   simvastatin (ZOCOR) 10 MG tablet 8/21/2024 at am  No Yes   Sig: TAKE 1 TABLET (10 MG) BY MOUTH DAILY.      Facility-Administered Medications: None        Review of Systems    Review of Systems   Constitutional:  Positive for chills and malaise/fatigue. Negative for diaphoresis, fever and weight loss.   Eyes:  Negative for blurred vision.   Respiratory:  Negative for cough, hemoptysis, sputum production, shortness of breath and wheezing.    Cardiovascular:  Negative for chest pain, palpitations and orthopnea.   Gastrointestinal:  Negative for abdominal pain, diarrhea, heartburn, melena, nausea and vomiting.   Genitourinary:  Positive for dysuria, frequency and hematuria. Negative for flank pain and urgency.   Skin:  Negative for itching and rash.   Neurological:  Negative for dizziness, tingling, seizures and weakness.   Psychiatric/Behavioral:  Negative for depression.           Physical Exam   Vital Signs: Temp: 98.1  F (36.7  C) Temp src: Oral BP: (!) 144/93 Pulse: 69   Resp: 18 SpO2: 96 % O2 Device: None (Room air)    Weight: 142 lbs 10.2 oz    Physical Exam  Constitutional:       General: She is not in acute distress.     Appearance: She is not ill-appearing, toxic-appearing or diaphoretic.   HENT:      Head: Normocephalic and atraumatic.      Mouth/Throat:      Mouth: Mucous membranes are dry.   Cardiovascular:      Rate and Rhythm: Normal rate and regular rhythm.      Heart sounds: No murmur heard.     No friction rub.   Pulmonary:      Effort: No respiratory  distress.      Breath sounds: No wheezing or rales.   Abdominal:      General: Bowel sounds are normal. There is no distension.      Palpations: Abdomen is soft.      Tenderness: There is no abdominal tenderness. There is no right CVA tenderness, left CVA tenderness or guarding.   Genitourinary:     Comments: Gross hematuria noted in purwick canister  Skin:     Capillary Refill: Capillary refill takes less than 2 seconds.   Neurological:      General: No focal deficit present.      Mental Status: She is alert and oriented to person, place, and time. Mental status is at baseline.   Psychiatric:         Mood and Affect: Mood normal.         Behavior: Behavior normal.           Medical Decision Making       75 MINUTES SPENT BY ME on the date of service doing chart review, history, exam, documentation & further activities per the note.      Data     I have personally reviewed the following data over the past 24 hrs:    16.1 (H)  \   13.9   / 181     138 97 (L) 44.4 (H) /  137 (H)   3.4 23 2.48 (H) \     ALT: 41 AST: 51 (H) AP: 105 TBILI: 1.4 (H)   ALB: 4.0 TOT PROTEIN: 7.7 LIPASE: 22     Procal: N/A CRP: N/A Lactic Acid: 4.9 (HH)         Imaging results reviewed over the past 24 hrs:   Recent Results (from the past 24 hour(s))   CT Abdomen Pelvis w/o Contrast    Narrative    EXAM: CT ABDOMEN PELVIS W/O CONTRAST  LOCATION: [facility]  DATE: 8/22/2024    INDICATION: Sepsis. Poor kidney function.   COMPARISON: MR pancreas 7/29/2024.   TECHNIQUE: CT scan of the abdomen and pelvis was performed without IV contrast. Multiplanar reformats were obtained. Dose reduction techniques were used.  CONTRAST: None.    FINDINGS:     LOWER CHEST: Subsegmental atelectasis.     HEPATOBILIARY: Cholelithiasis. No biliary ductal dilation. No liver lesions.     PANCREAS: Known cystic lesion within the pancreatic body/tail is not well visualized due to the noncontrast technique. No peripancreatic inflammation or fluid  collections.     SPLEEN: Normal.    ADRENAL GLANDS: Normal.    KIDNEYS/BLADDER: Bilateral perinephric edema. Nonobstructing 1.0 x 0.7 x 1.5 cm calculus at the lower pole of the left kidney. Nonobstructing 0.2 cm calculus at the lower pole of the right kidney. No ureteral calculi or hydronephrosis. Normal bladder. No   intraluminal bladder calculi.     BOWEL: No bowel obstruction or inflammation. Sigmoid colonic diverticulosis. No inflamed diverticuli.     LYMPH NODES: No enlarged lymph nodes.     VASCULATURE: Moderate aortobiiliac sclerosis. No abdominal aortic aneurysm.     PELVIC ORGANS: Normal.    MUSCULOSKELETAL: Thoracolumbar dextroscoliosis with multilevel degenerative changes of the spine. Degenerative changes of the right hip. No acute bony abnormality or destructive bone lesions. Tiny fat-containing periumbilical hernia.       Impression    IMPRESSION:     1.  No definite acute abnormality, within the limitations of the noncontrast technique.    2.  Nonobstructing 1.5 cm left renal calculus. There is also a nonobstructing 0.2 cm right renal calculus. No hydronephrosis.     3.  Cholelithiasis.    4.  Sigmoid colonic diverticulosis. No inflamed diverticuli.    5.  A known cystic pancreatic lesion is not well visualized due to the noncontrast technique.   Chest XR,  PA & LAT    Narrative    EXAM: XR CHEST 2 VIEWS  LOCATION: [facility]  DATE: 8/22/2024    INDICATION: Septic. Pancreatic lesion.  COMPARISON: CT CAP 7/3/2024,, chest x-ray 6/8/2022        Impression    IMPRESSION: Lungs are clear. Heart and pulmonary vascularity are normal. No signs of acute disease.

## 2024-08-22 NOTE — ED PROVIDER NOTES
EMERGENCY DEPARTMENT ENCOUNTER      NAME: Kalpana Manzo  AGE: 84 year old female  YOB: 1940  MRN: [id number]  EVALUATION DATE & TIME: No admission date for patient encounter.    PCP: Mela López    ED PROVIDER: Symone Rueda PA-C      Chief Complaint   Patient presents with    Generalized Weakness    Dizziness    Hypotension         FINAL IMPRESSION:  1. Severe sepsis (H)    2. UTI (urinary tract infection)          ED COURSE & MEDICAL DECISION MAKING:    Pertinent Labs & Imaging studies reviewed. (See chart for details)    84 year old female presents to the Emergency Department for evaluation of weakness and low blood pressures.    Physical exam is remarkable for a generally well-appearing female who is in no acute distress.  Heart and lung sounds are clear diffusely throughout.  Abdomen is soft and nontender.  Oropharynx is unremarkable appearing.  Vital signs remarkable for borderline hypotension here with initial blood pressure of 104/52, remainder are stable and she is afebrile.    CBC is remarkable for leukocytosis with white blood cell count of 16.1.  CMP is remarkable for significantly worsened kidney function and baseline with creatinine of 2.48 and GFR of 19 (about double her usual), no significant electrolyte derangements and mild elevation of total bilirubin of 1.4 with essentially normal LFTs.  Lipase within normal limits.  Magnesium elevated at 2.5.  Initial lactate elevated at 2.2, repeat after 15cc/kg actually increased to 4.4.  Urinalysis is consistent with infection with nitrate positive urine, leukocyte esterase, blood, bolus white blood cell clumps, and pyuria.  CT of the abdomen/pelvis obtained without contrast without any evidence of obstructive process.  Chest x-ray unremarkable.    The patient was given two 1 L IV boluses of fluid and IV Rocephin was initiated as soon as sepsis was identified.  1 blood culture is pending.  Patient will be admitted for treatment of  likely urosepsis.  She is agreeable with this treatment plan and verbalized understanding.  Care was discussed with staff physician Dr. Garfield Verduzco who is in agreement with the treatment plan.      Medical Decision Making  Obtained supplemental history:Supplemental history obtained?: Caregiver  Reviewed external records: External records reviewed?: Outpatient Record: Clinic visit this morning  Care impacted by chronic illness:Anticoagulated State, Chronic Kidney Disease, and Hypertension  Care significantly affected by social determinants of health:Access to Medical Care  Did you consider but not order tests?: Work up considered but not performed and documented in chart, if applicable  Did you interpret images independently?: Independent interpretation of ECG and images noted in documentation, when applicable.  Consultation discussion with other provider:Did you involve another provider (consultant, , pharmacy, etc.)?: I discussed the care with another health care provider, see documentation for details.  Admit.  No MIPS measures identified.      ED Course   12:25 PM Performed my initial history and physical exam. Discussed workup in the emergency department, management of symptoms, and likely disposition.   12:47 PM Patient noted to be septic with lactic acidosis of 2.2 and WBC count of 16.1. IV fluids and IV antibiotics ordered.  12:54 PM Staffed with Dr. Garfield Verduzco.  3:20 PM Repeat lactate 4.4, called code sepsis.  3:49 PM Updated patient with test results and discussed plan for admission.   4:07 PM Discussed with hospitalist service.    At the conclusion of the encounter I discussed the results of all of the tests and the disposition. The questions were answered. The patient or family acknowledged understanding and was agreeable with the care plan.     Voice recognition software was used in the creation of this note. Any grammatical or nonsensical errors are due to inherent errors with the software and are not  the intention of the writer.     MEDICATIONS GIVEN IN THE EMERGENCY:  Medications   lidocaine 1 % 0.1-1 mL (has no administration in time range)   lidocaine (LMX4) cream (has no administration in time range)   sodium chloride (PF) 0.9% PF flush 3 mL (3 mLs Intracatheter Not Given 8/22/24 1703)   sodium chloride (PF) 0.9% PF flush 3 mL (has no administration in time range)   acetaminophen (TYLENOL) tablet 650 mg (has no administration in time range)     Or   acetaminophen (TYLENOL) Suppository 650 mg (has no administration in time range)   senna-docusate (SENOKOT-S/PERICOLACE) 8.6-50 MG per tablet 1 tablet (has no administration in time range)     Or   senna-docusate (SENOKOT-S/PERICOLACE) 8.6-50 MG per tablet 2 tablet (has no administration in time range)   polyethylene glycol (MIRALAX) Packet 17 g (has no administration in time range)   ondansetron (ZOFRAN ODT) ODT tab 4 mg (has no administration in time range)     Or   ondansetron (ZOFRAN) injection 4 mg (has no administration in time range)   calcium carbonate (TUMS) chewable tablet 1,000 mg (has no administration in time range)   sodium chloride 0.9 % infusion (has no administration in time range)   cefTRIAXone (ROCEPHIN) 1 g vial to attach to  mL bag for ADULTS or NS 50 mL bag for PEDS (has no administration in time range)   melatonin tablet 1 mg (has no administration in time range)   lidocaine 1 % 0.1-1 mL (has no administration in time range)   lidocaine (LMX4) cream (has no administration in time range)   sodium chloride (PF) 0.9% PF flush 3 mL (3 mLs Intracatheter Not Given 8/22/24 1703)   sodium chloride (PF) 0.9% PF flush 3 mL (has no administration in time range)   apixaban ANTICOAGULANT (ELIQUIS) tablet 5 mg ( Oral Automatically Held 8/25/24 2000)   hydrochlorothiazide half-tab 12.5 mg ( Oral Automatically Held 8/25/24 0800)   simvastatin (ZOCOR) tablet 10 mg ( Oral Automatically Held 8/25/24 0800)   sodium chloride 0.9% BOLUS 1,000 mL (0 mLs  Intravenous Stopped 8/22/24 1400)   cefTRIAXone (ROCEPHIN) 1 g vial to attach to  mL bag for ADULTS or NS 50 mL bag for PEDS (0 g Intravenous Stopped 8/22/24 1457)   sodium chloride 0.9% BOLUS 1,000 mL (0 mLs Intravenous Stopped 8/22/24 1701)   acetaminophen (TYLENOL) tablet 975 mg (975 mg Oral $Given 8/22/24 1603)   sodium chloride 0.9% BOLUS 500 mL (500 mLs Intravenous $New Bag 8/22/24 1706)       NEW PRESCRIPTIONS STARTED AT TODAY'S ER VISIT  New Prescriptions    No medications on file            =================================================================    HPI    Patient information was obtained from: Patient    Use of : N/A         Kalpana Manzo is a 84 year old female who presents to the emergency department via walk-in with  for evaluation of generalized weakness.    The patient states that she was at a preoperative appointment this morning with her primary care clinic for a pancreatic lesion biopsy when she was noted to be hypotensive x 2 in the clinic.  A CBC showed a white count of 15.3 so she was directed to the emergency department.    The patient notes that she has felt generally weak and lightheaded intermittently over the last 5 days.  She states that she also noticed a tiny amount of blood in her urine at some point during the last week; she notes she had a UTI recently but her symptoms resolved with antibiotics.  She denies any new pain, chest pain, difficulty breathing, confusion, dysuria, vomiting, diarrhea, cough, sore throat, runny or stuffy nose.  She fell about a month ago but denies any recent falls.      REVIEW OF SYSTEMS   Review of Systems   Constitutional:  Negative for chills and fever.   HENT:  Negative for congestion, rhinorrhea and sore throat.    Respiratory:  Negative for cough and shortness of breath.    Cardiovascular:  Negative for chest pain.   Gastrointestinal:  Negative for abdominal pain, diarrhea, nausea and vomiting.   Genitourinary:   Positive for hematuria. Negative for dysuria.   Neurological:  Positive for weakness and light-headedness. Negative for syncope.   Psychiatric/Behavioral:  Negative for confusion.        All other systems reviewed and are negative unless noted in HPI.      PAST MEDICAL HISTORY:  Past Medical History:   Diagnosis Date    Basal cell carcinoma of skin     Created by Conversion  Replacement Utility updated for latest IMO load    Contact dermatitis and other eczema, due to unspecified cause     Created by Conversion     Dermatophytosis of nail     Created by Conversion     Disorder of bone and cartilage     Created by Conversion  Replacement Utility updated for latest IMO load    Hepatitis     Created by Conversion [facility] Annotation: Aug 22 2011  [id number] - Mela López: in the 1980's  unspecified  Replacement Utility updated for latest IMO load    Hyperlipidemia     Created by Conversion     Nocturia     Created by Conversion     Osteoarthrosis involving lower leg     Created by Conversion  Replacement Utility updated for latest IMO load    Prediabetes 7/18/2016    Swelling of limb     Created by Conversion        PAST SURGICAL HISTORY:  Past Surgical History:   Procedure Laterality Date    BIOPSY BREAST Left 1987    BIOPSY BREAST Right 1992    BIOPSY OF BREAST, INCISIONAL      Description: Biopsy Breast Open;  Recorded: 05/19/2008;    HC REMOVE TONSILS/ADENOIDS,<11 Y/O      Description: Tonsillectomy With Adenoidectomy;  Recorded: 05/19/2008;    REVISE SECONDARY VARICOSITY      Description: Varicose Vein Ligation;  Recorded: 05/19/2008;    TOTAL KNEE ARTHROPLASTY Right 08/2008    [facility] APPENDECTOMY      Description: Appendectomy;  Recorded: 05/19/2008;       CURRENT MEDICATIONS:    acetaminophen (TYLENOL) 500 MG tablet  apixaban ANTICOAGULANT (ELIQUIS) 5 MG tablet  cholecalciferol, vitamin D3, (VITAMIN D3) 2,000 unit Tab  GLUCOSAM HCL/CHONDRO MONTES A/C/MN (GLUCOSAMINE-CHONDROITIN COMPLX ORAL)  hydroCHLOROthiazide  "12.5 MG tablet  multivitamin capsule  simvastatin (ZOCOR) 10 MG tablet        ALLERGIES:  Allergies   Allergen Reactions    Sulfamethoxazole-Trimethoprim Dizziness    Tetracycline Nausea       FAMILY HISTORY:  Family History   Problem Relation Age of Onset    Varicose Veins Mother        SOCIAL HISTORY:   Social History     Socioeconomic History    Marital status:    Tobacco Use    Smoking status: Never    Smokeless tobacco: Never   Vaping Use    Vaping status: Never Used   Substance and Sexual Activity    Alcohol use: Yes     Comment: Alcoholic Drinks/day: rare    Drug use: No   Social History Narrative    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        VITALS:  Patient Vitals for the past 24 hrs:   BP Temp Temp src Pulse Resp SpO2 Height Weight   08/22/24 1536 (!) 144/93 98.1  F (36.7  C) Oral -- 18 96 % -- --   08/22/24 1342 110/58 -- -- 69 -- 99 % -- --   08/22/24 1327 105/53 -- -- 69 -- 99 % -- --   08/22/24 1312 118/56 -- -- 73 -- 99 % -- --   08/22/24 1224 104/52 98  F (36.7  C) Temporal 89 18 96 % 1.676 m (5' 6\") 64.7 kg (142 lb 10.2 oz)       PHYSICAL EXAM    VITAL SIGNS: BP (!) 144/93 (Cuff Size: Adult Regular)   Pulse 69   Temp 98.1  F (36.7  C) (Oral)   Resp 18   Ht 1.676 m (5' 6\")   Wt 64.7 kg (142 lb 10.2 oz)   SpO2 96%   BMI 23.02 kg/m    General Appearance: Alert, cooperative, normal speech and facial symmetry, appears stated age, the patient does not appear in distress  Head:  Normocephalic, without obvious abnormality, atraumatic  Eyes: Conjunctiva/corneas clear, EOM's intact, no nystagmus, PERRL  ENT:  Lips, mucosa, and tongue normal; teeth and gums normal, no pharyngeal inflammation, no dysphonia or difficulty swallowing, membranes are moist without pallor  Cardio:  Regular rate and rhythm, S1 and S2 normal, no murmur, rub    or gallop, 2+ pulses symmetric in all extremities  Pulm:  Clear to auscultation bilaterally, respirations unlabored with no accessory muscle use  Abdomen:  Abdomen is soft, " non-distended with no tenderness to palpation, rebound tenderness, or guarding.   Extremities:  Extremities normal, there is no tenderness to palpation, atraumatic, no cyanosis or edema, full function and range of motion, pulses equal in all extremities, normal cap refill, no joint swelling  Neuro: Patient is awake, alert, and responsive to voice. No gross motor weaknesses or sensory loss; moves all extremities.    LAB:  All pertinent labs reviewed and interpreted.  Labs Ordered and Resulted from Time of ED Arrival to Time of ED Departure   COMPREHENSIVE METABOLIC PANEL - Abnormal       Result Value    Sodium 138      Potassium 3.4      Carbon Dioxide (CO2) 23      Anion Gap 18 (*)     Urea Nitrogen 44.4 (*)     Creatinine 2.48 (*)     GFR Estimate 19 (*)     Calcium 9.0      Chloride 97 (*)     Glucose 137 (*)     Alkaline Phosphatase 105      AST 51 (*)     ALT 41      Protein Total 7.7      Albumin 4.0      Bilirubin Total 1.4 (*)    LACTIC ACID WHOLE BLOOD WITH 1X REPEAT IN 2 HR WHEN >2 - Abnormal    Lactic Acid, Initial 2.2 (*)    MAGNESIUM - Abnormal    Magnesium 2.5 (*)    ROUTINE UA WITH MICROSCOPIC REFLEX TO CULTURE - Abnormal    Color Urine Light Orange (*)     Appearance Urine Cloudy (*)     Glucose Urine Negative      Bilirubin Urine Negative      Ketones Urine Negative      Specific Gravity Urine 1.010      Blood Urine >1.0 mg/dL (*)     pH Urine 6.0      Protein Albumin Urine 300 (*)     Urobilinogen Urine <2.0      Nitrite Urine Positive (*)     Leukocyte Esterase Urine 500 Ramiro/uL (*)     Bacteria Urine Few (*)     WBC Clumps Urine Present (*)     Mucus Urine Present (*)     RBC Urine >182 (*)     WBC Urine >182 (*)     Squamous Epithelials Urine 4 (*)     Transitional Epithelials Urine <1      Hyaline Casts Urine 38 (*)     Granular Casts Urine 6 (*)    CBC WITH PLATELETS AND DIFFERENTIAL - Abnormal    WBC Count 16.1 (*)     RBC Count 4.51      Hemoglobin 13.9      Hematocrit 41.2      MCV 91       MCH 30.8      MCHC 33.7      RDW 12.8      Platelet Count 181      % Neutrophils 84      % Lymphocytes 8      % Monocytes 7      % Eosinophils 0      % Basophils 0      % Immature Granulocytes 0      NRBCs per 100 WBC 0      Absolute Neutrophils 13.5 (*)     Absolute Lymphocytes 1.4      Absolute Monocytes 1.2      Absolute Eosinophils 0.0      Absolute Basophils 0.0      Absolute Immature Granulocytes 0.1      Absolute NRBCs 0.0     LACTIC ACID WHOLE BLOOD - Abnormal    Lactic Acid 4.9 (*)    LIPASE - Normal    Lipase 22     LACTIC ACID WHOLE BLOOD   LACTIC ACID WHOLE BLOOD   BLOOD CULTURE   URINE CULTURE       RADIOLOGY:  Reviewed all pertinent imaging. Please see official radiology report.  Chest XR,  PA & LAT   Final Result   IMPRESSION: Lungs are clear. Heart and pulmonary vascularity are normal. No signs of acute disease.      CT Abdomen Pelvis w/o Contrast   Final Result   IMPRESSION:       1.  No definite acute abnormality, within the limitations of the noncontrast technique.      2.  Nonobstructing 1.5 cm left renal calculus. There is also a nonobstructing 0.2 cm right renal calculus. No hydronephrosis.       3.  Cholelithiasis.      4.  Sigmoid colonic diverticulosis. No inflamed diverticuli.      5.  A known cystic pancreatic lesion is not well visualized due to the noncontrast technique.          EKG:    Performed at: 12:50    I have independently reviewed and interpreted the EKG, along with the final read. EKG also reviewed by Dr. Garfield Verduzco.    Ventricular rate 85 bpm  MO interval 130 ms  QRS duration 80 ms  QT/QTc 358/426  P-R-T axes 53 21 18    Impression: Sinus rhythm with occasional PVCs    PROCEDURES:   Critical Care  Performed by: Symone Calderón PA-C  Authorized by: Symone Calderón PA-C    Total critical care time: 30 minutes  Critical care time was exclusive of separately billable procedures and treating other patients.  Critical care was necessary to treat or prevent imminent or life-threatening  deterioration of the following conditions: Severe sepsis  Critical care was time spent personally by me on the following activities: development of treatment plan with patient or surrogate, discussions with consultants, examination of patient, evaluation of patient's response to treatment, obtaining history from patient or surrogate, ordering and performing treatments and interventions, ordering and review of laboratory studies, ordering and review of radiographic studies and re-evaluation of patient's condition, this excludes any separately billable procedures.      Symone Rueda PA-C  Emergency Medicine  [facility] EMERGENCY ROOM  [address] [place] [id number]  [phone]  Dept: [phone]       Symone Rueda PA-C  08/22/24 1526

## 2024-08-22 NOTE — PHARMACY-ADMISSION MEDICATION HISTORY
Pharmacist Admission Medication History    Admission medication history is complete. The information provided in this note is only as accurate as the sources available at the time of the update.    Information Source(s): Patient and CareEverywhere/SureScripts via in-person    Pertinent Information: n/a    Changes made to PTA medication list:  Added: hydrochlorothiazide   Deleted: diazepam   Changed: None    Allergies reviewed with patient and updates made in EHR: yes    Medication History Completed By: Kim Sanford RPH 8/22/2024 2:03 PM    PTA Med List   Medication Sig Last Dose    acetaminophen (TYLENOL) 500 MG tablet Take 500-1,000 mg by mouth every 6 hours as needed for mild pain 8/22/2024 at 500mg this morning.    apixaban ANTICOAGULANT (ELIQUIS) 5 MG tablet Take 1 tablet (5 mg) by mouth 2 times daily 8/22/2024 at am    cholecalciferol, vitamin D3, (VITAMIN D3) 2,000 unit Tab Take 1,000 Units by mouth daily 8/22/2024 at am    GLUCOSAM HCL/CHONDRO MONTES A/C/MN (GLUCOSAMINE-CHONDROITIN COMPLX ORAL) [GLUCOSAM HCL/CHONDRO MONTES A/C/MN (GLUCOSAMINE-CHONDROITIN COMPLX ORAL)] Take 1 tablet by mouth daily. Tablet 8/22/2024 at am    hydroCHLOROthiazide 12.5 MG tablet Take 12.5 mg by mouth daily. 8/21/2024 at am    multivitamin capsule [MULTIVITAMIN CAPSULE] Take 1 capsule by mouth daily. 8/22/2024 at am    simvastatin (ZOCOR) 10 MG tablet TAKE 1 TABLET (10 MG) BY MOUTH DAILY. 8/21/2024 at am

## 2024-08-22 NOTE — ED NOTES
Expected Patient Referral to ED  11:32 AM    Referring Clinic/Provider:    Dr. López, [facility].        Reason for referral/Clinical facts:    There for a preop visit for pancreas problems.  Patient has been having alternating hot and cold sensations, initial blood pressure 96/64 and rechecked at 88/56.  Looks ill.  White blood cell count 15,000.  Increasing weak and tired.  Worried about possible early sepsis or other infection.  Coming by private vehicle.          Recommendations provided:  Send to ED for further evaluation    Caller was informed that this institution does possess the capabilities and/or resources to provide for patient and should be transferred to our facility.    Discussed that if direct admit is sought and any hurdles are encountered, this ED would be happy to see the patient and evaluate.    Informed caller that recommendations provided are recommendations based only on the facts provided and that they responsible to accept or reject the advice, or to seek a formal in person consultation as needed and that this ED will see/treat patient should they arrive.      Constantin Verduzco MD  [facility] EMERGENCY ROOM  [address] [facility] [id number] [phone]       Constantin Verduzco MD  08/22/24 3313

## 2024-08-22 NOTE — PROGRESS NOTES
Preoperative Evaluation  [facility]  [address]  [facility] [address]  Phone: [phone]  Fax: [phone]  Primary Provider: TANIKA YU MD  Pre-op Performing Provider: TANIKA YU MD  Aug 22, 2024             8/18/2024   Surgical Information   What procedure is being done? pre-op physical   Facility or Hospital where procedure/surgery will be performed: [facility]   Who is doing the procedure / surgery? Don't know   Date of surgery / procedure: 8/30/24   Time of surgery / procedure: Don't know   Where do you plan to recover after surgery? at home with family        Fax number for surgical facility: Note does not need to be faxed, will be available electronically in Epic.    Assessment & Plan     The proposed surgical procedure is considered LOW risk.    Problem List Items Addressed This Visit       Chronic kidney disease, stage 3a (H)    Benign essential hypertension    Relevant Orders    Comprehensive metabolic panel (BMP + Alb, Alk Phos, ALT, AST, Total. Bili, TP)    Pancreatic mass    Relevant Orders    Lipase     Other Visit Diagnoses       Preop general physical exam    -  Primary    Relevant Orders    CBC with platelets (Completed)    Fatigue, unspecified type        Relevant Orders    TSH with free T4 reflex    UA Macroscopic with reflex to Microscopic and Culture - Clinic Collect    Weakness        Leukocytosis, unspecified type                      - No identified additional risk factors other than previously addressed    Antiplatelet or Anticoagulation Medication Instructions  The patient is on Eliquis and will need to hold for 4 days prior to the procedure based on bleeding risk.  She is over 3 months out from a proximal DVT and atrial thrombus.  Bridging is not indicated based on the literature.    Additional Medication Instructions   - Statins: Continue taking on the day of surgery.     Recommendation    The patient presented today  appearing weaker than her usual with low blood pressure as well as sleeping more than is her usual.  Her white blood cell count was elevated although there was no definite focal sign on exam or by history of a source of infection.  Her blood pressure on recheck was even lower and I was concerned about the possibility of sepsis and felt she needed further evaluation more urgently.  I referred the patient to the emergency room and spoke with one of the ED doctors to transfer her.  In addition, I did speak with the gastroenterologist and advised that I did not feel that proceeding with the procedure on the 30th is advisable and he is going to schedule a consultative appointment with the patient and her  in the near future.    Rowena Acuna is an 84 year old who presented to the emergency room at the beginning of July following a fall.  The patient's fall resulted in rib pain and she had a CT of her chest which demonstrated rib fractures but also incidentally found a cystic pancreatic mass.  The patient had an MRI and it was recommended to proceed with a endoscopic ultrasound with biopsy as a next step.  She is scheduled for that on August 30.    However, the patient reports that in the past few days she has been having intermittent symptoms of feeling hot and then cold.  She states that the last time she felt that way was on Monday afternoon.  However, her  also notes that he she has been sleeping more than usual and seems more weak.  The patient has a history of recurrent urinary tract infection and wonders if that could be the reason.  She denies any bladder symptoms specifically and also has not been having any abdominal pain, back pain, cough, headache or other focal symptoms.      Pre-Op Exam          8/22/2024     9:27 AM   Additional Questions   Roomed by as   Accompanied by          8/22/2024     9:27 AM   Patient Reported Additional Medications   Patient reports taking the following  new medications no     HPI related to upcoming procedure: The patient         8/18/2024   Pre-Op Questionnaire   Have you ever had a heart attack or stroke? No   Have you ever had surgery on your heart or blood vessels, such as a stent placement, a coronary artery bypass, or surgery on an artery in your head, neck, heart, or legs? No   Do you have chest pain with activity? No   Do you have a history of heart failure? No   Do you currently have a cold, bronchitis or symptoms of other infection? No   Do you have a cough, shortness of breath, or wheezing? No   Do you or anyone in your family have previous history of blood clots? (!) YES: thrombus in atrium   Do you or does anyone in your family have a serious bleeding problem such as prolonged bleeding following surgeries or cuts? No   Have you ever had problems with anemia or been told to take iron pills? No   Have you had any abnormal blood loss such as black, tarry or bloody stools, or abnormal vaginal bleeding? No   Have you ever had a blood transfusion? No   Are you willing to have a blood transfusion if it is medically needed before, during, or after your surgery? Yes   Have you or any of your relatives ever had problems with anesthesia? No   Do you have sleep apnea, excessive snoring or daytime drowsiness? No   Do you have any artifical heart valves or other implanted medical devices like a pacemaker, defibrillator, or continuous glucose monitor? No   Do you have artificial joints? (!) YES   Are you allergic to latex? No        Health Care Directive  Patient has a Health Care Directive on file      Preoperative Review of    reviewed - no record of controlled substances prescribed.      Status of Chronic Conditions:  See problem list for active medical problems.  Problems all longstanding and stable, except as noted/documented.  See ROS for pertinent symptoms related to these conditions.    Patient Active Problem List    Diagnosis Date Noted     Nephrolithiasis 07/08/2024     Priority: Medium    Pancreatic mass 07/08/2024     Priority: Medium    Chronic deep vein thrombosis (DVT) of femoral vein of left lower extremity (H) 05/20/2024     Priority: Medium    Atrial thrombus 05/20/2024     Priority: Medium    Abnormal stress test 05/20/2024     Priority: Medium    Chronic kidney disease, stage 3a (H) 05/22/2023     Priority: Medium    Benign essential hypertension 05/22/2023     Priority: Medium    Overactive bladder 05/02/2022     Priority: Medium    Hoarseness 05/02/2022     Priority: Medium    Seasonal allergic rhinitis 05/17/2021     Priority: Medium    Hyperlipidemia      Priority: Medium     Created by Conversion        Osteopenia      Priority: Medium     Created by Conversion  Replacement Utility updated for latest IMO load        Midline cystocele 07/06/2020     Priority: Medium    Varicose vein of leg 08/06/2018     Priority: Medium    Basal Cell Carcinoma Of The Skin      Priority: Medium     Created by Conversion  Replacement Utility updated for latest IMO load        Osteoarthritis Of The Knee      Priority: Medium     Created by Conversion  Replacement Utility updated for latest IMO load    Replacing diagnoses that were inactivated after the 10/1/2021 regulatory import.      Prediabetes 07/18/2016     Priority: Medium    Eczema      Priority: Medium     Created by Conversion        Limb Swelling      Priority: Medium     Created by Conversion        Onychomycosis      Priority: Medium     Created by Conversion          Past Medical History:   Diagnosis Date    Basal cell carcinoma of skin     Created by Conversion  Replacement Utility updated for latest IMO load    Contact dermatitis and other eczema, due to unspecified cause     Created by Conversion     Dermatophytosis of nail     Created by Conversion     Disorder of bone and cartilage     Created by Conversion  Replacement Utility updated for latest IMO load    Hepatitis     Created by  [facility] Annotation: Aug 22 2011  9:09AM - Mela López: in the 1980's  unspecified  Replacement Utility updated for latest IMO load    Hyperlipidemia     Created by Conversion     Nocturia     Created by Conversion     Osteoarthrosis involving lower leg     Created by Conversion  Replacement Utility updated for latest IMO load    Prediabetes 7/18/2016    Swelling of limb     Created by Conversion      Past Surgical History:   Procedure Laterality Date    BIOPSY BREAST Left 1987    BIOPSY BREAST Right 1992    BIOPSY OF BREAST, INCISIONAL      Description: Biopsy Breast Open;  Recorded: 05/19/2008;    HC REMOVE TONSILS/ADENOIDS,<11 Y/O      Description: Tonsillectomy With Adenoidectomy;  Recorded: 05/19/2008;    REVISE SECONDARY VARICOSITY      Description: Varicose Vein Ligation;  Recorded: 05/19/2008;    TOTAL KNEE ARTHROPLASTY Right 08/2008    [facility] APPENDECTOMY      Description: Appendectomy;  Recorded: 05/19/2008;     Current Outpatient Medications   Medication Sig Dispense Refill    acetaminophen (TYLENOL) 500 MG tablet Take 500-1,000 mg by mouth every 6 hours as needed for mild pain      apixaban ANTICOAGULANT (ELIQUIS) 5 MG tablet Take 1 tablet (5 mg) by mouth 2 times daily 180 tablet 3    cholecalciferol, vitamin D3, (VITAMIN D3) 2,000 unit Tab Take 1,000 Units by mouth daily      diazepam (VALIUM) 5 MG tablet Take 1 PO one hour prior to MRI; OK to repeat X 1 if needed 2 tablet 0    GLUCOSAM HCL/CHONDRO MONTES A/C/MN (GLUCOSAMINE-CHONDROITIN COMPLX ORAL) [GLUCOSAM HCL/CHONDRO MONTES A/C/MN (GLUCOSAMINE-CHONDROITIN COMPLX ORAL)] Take 1 tablet by mouth daily. Tablet      multivitamin capsule [MULTIVITAMIN CAPSULE] Take 1 capsule by mouth daily.      simvastatin (ZOCOR) 10 MG tablet TAKE 1 TABLET (10 MG) BY MOUTH DAILY. 90 tablet 1       Allergies   Allergen Reactions    Bactrim [Sulfamethoxazole-Trimethoprim] Dizziness    Sulfamethoxazole-Trimethoprim Dizziness    Sulfa Antibiotics     Tetracycline  "Nausea        Social History     Tobacco Use    Smoking status: Never    Smokeless tobacco: Never   Substance Use Topics    Alcohol use: Yes     Comment: Alcoholic Drinks/day: rare     Family History   Problem Relation Age of Onset    Varicose Veins Mother      History   Drug Use No             Review of Systems  CONSTITUTIONAL: NEGATIVE for fever, chills, change in weight  INTEGUMENTARY/SKIN: NEGATIVE for worrisome rashes, moles or lesions  EYES: NEGATIVE for vision changes or irritation  ENT/MOUTH: NEGATIVE for ear, mouth and throat problems  RESP: NEGATIVE for significant cough or SOB  BREAST: NEGATIVE for masses, tenderness or discharge  CV: NEGATIVE for chest pain, palpitations or peripheral edema  GI: NEGATIVE for nausea, abdominal pain, heartburn, or change in bowel habits  : NEGATIVE for frequency, dysuria, or hematuria  MUSCULOSKELETAL: NEGATIVE for significant arthralgias or myalgia  NEURO: NEGATIVE for weakness, dizziness or paresthesias  ENDOCRINE: NEGATIVE for temperature intolerance, skin/hair changes  HEME: NEGATIVE for bleeding problems  PSYCHIATRIC: NEGATIVE for changes in mood or affect    Objective    BP (!) 88/56 (BP Location: Left arm, Patient Position: Left side, Cuff Size: Adult Regular)   Pulse 93   Temp 97.9  F (36.6  C) (Oral)   Resp 12   Ht 1.676 m (5' 6\")   Wt 64.7 kg (142 lb 9.6 oz)   SpO2 98%   BMI 23.02 kg/m     Estimated body mass index is 23.02 kg/m  as calculated from the following:    Height as of this encounter: 1.676 m (5' 6\").    Weight as of this encounter: 64.7 kg (142 lb 9.6 oz).  Physical Exam  GENERAL: alert and no distress; she does appear more fatigued and weak than I am used to seeing her.  EYES: Eyes grossly normal to inspection, PERRL and conjunctivae and sclerae normal  HENT: ear canals and TM's normal, nose and mouth without ulcers or lesions  NECK: no adenopathy, no asymmetry, masses, or scars  RESP: lungs clear to auscultation - no rales, rhonchi or " wheezes  CV: regular rate and rhythm, normal S1 S2, no S3 or S4, no murmur, click or rub, no peripheral edema  ABDOMEN: soft, nontender, no hepatosplenomegaly, no masses and bowel sounds normal  MS: no gross musculoskeletal defects noted, no edema  SKIN: no suspicious lesions or rashes  NEURO: Normal strength and tone, mentation intact and speech normal  PSYCH: mentation appears normal, affect normal    Recent Labs   Lab Test 07/03/24  0801 09/18/23  1638   HGB 13.7  --      --     142   POTASSIUM 3.5 3.7   CR 1.05* 1.10*        Diagnostics  Labs pending at this time.  Results will be reviewed when available.   No EKG required for low risk surgery (cataract, skin procedure, breast biopsy, etc).    Revised Cardiac Risk Index (RCRI)  The patient has the following serious cardiovascular risks for perioperative complications:   - No serious cardiac risks = 0 points     RCRI Interpretation: 0 points: Class I (very low risk - 0.4% complication rate)         Signed Electronically by: TANIKA YU MD  A copy of this evaluation report is provided to the requesting physician.

## 2024-08-22 NOTE — PLAN OF CARE
Pt's lactic acid trending down now is 1.4. NS running at 100ml.hr.   Nayeli Coker RN on 8/22/2024 at 6:27 PM

## 2024-08-23 ENCOUNTER — APPOINTMENT (OUTPATIENT)
Dept: OCCUPATIONAL THERAPY | Facility: CLINIC | Age: 84
DRG: 871 | End: 2024-08-23
Payer: COMMERCIAL

## 2024-08-23 ENCOUNTER — APPOINTMENT (OUTPATIENT)
Dept: PHYSICAL THERAPY | Facility: CLINIC | Age: 84
DRG: 871 | End: 2024-08-23
Payer: COMMERCIAL

## 2024-08-23 LAB
ACINETOBACTER SPECIES: NOT DETECTED
ALBUMIN SERPL BCG-MCNC: 2.6 G/DL (ref 3.5–5.2)
ALP SERPL-CCNC: 81 U/L (ref 40–150)
ALT SERPL W P-5'-P-CCNC: 30 U/L (ref 0–50)
ANION GAP SERPL CALCULATED.3IONS-SCNC: 13 MMOL/L (ref 7–15)
ANION GAP SERPL CALCULATED.3IONS-SCNC: 13 MMOL/L (ref 7–15)
AST SERPL W P-5'-P-CCNC: 52 U/L (ref 0–45)
BACTERIA UR CULT: NORMAL
BASE EXCESS BLDV CALC-SCNC: -5 MMOL/L (ref -3–3)
BASOPHILS # BLD AUTO: 0 10E3/UL (ref 0–0.2)
BASOPHILS # BLD AUTO: 0 10E3/UL (ref 0–0.2)
BASOPHILS NFR BLD AUTO: 0 %
BASOPHILS NFR BLD AUTO: 0 %
BILIRUB SERPL-MCNC: 0.7 MG/DL
BUN SERPL-MCNC: 36.8 MG/DL (ref 8–23)
BUN SERPL-MCNC: 39.3 MG/DL (ref 8–23)
CALCIUM SERPL-MCNC: 7.2 MG/DL (ref 8.8–10.4)
CALCIUM SERPL-MCNC: 7.3 MG/DL (ref 8.8–10.4)
CHLORIDE SERPL-SCNC: 110 MMOL/L (ref 98–107)
CHLORIDE SERPL-SCNC: 111 MMOL/L (ref 98–107)
CITROBACTER SPECIES: NOT DETECTED
CORTIS SERPL-MCNC: 28.8 UG/DL
CREAT SERPL-MCNC: 1.95 MG/DL (ref 0.51–0.95)
CREAT SERPL-MCNC: 2.11 MG/DL (ref 0.51–0.95)
CTX-M: NOT DETECTED
EGFRCR SERPLBLD CKD-EPI 2021: 23 ML/MIN/1.73M2
EGFRCR SERPLBLD CKD-EPI 2021: 25 ML/MIN/1.73M2
ENTEROBACTER SPECIES: NOT DETECTED
EOSINOPHIL # BLD AUTO: 0 10E3/UL (ref 0–0.7)
EOSINOPHIL # BLD AUTO: 0 10E3/UL (ref 0–0.7)
EOSINOPHIL NFR BLD AUTO: 0 %
EOSINOPHIL NFR BLD AUTO: 0 %
ERYTHROCYTE [DISTWIDTH] IN BLOOD BY AUTOMATED COUNT: 12.8 % (ref 10–15)
ERYTHROCYTE [DISTWIDTH] IN BLOOD BY AUTOMATED COUNT: 12.8 % (ref 10–15)
ESCHERICHIA COLI: DETECTED
GLUCOSE SERPL-MCNC: 104 MG/DL (ref 70–99)
GLUCOSE SERPL-MCNC: 129 MG/DL (ref 70–99)
HCO3 BLDV-SCNC: 20 MMOL/L (ref 21–28)
HCO3 SERPL-SCNC: 17 MMOL/L (ref 22–29)
HCO3 SERPL-SCNC: 18 MMOL/L (ref 22–29)
HCT VFR BLD AUTO: 31.4 % (ref 35–47)
HCT VFR BLD AUTO: 32 % (ref 35–47)
HGB BLD-MCNC: 10.7 G/DL (ref 11.7–15.7)
HGB BLD-MCNC: 11.1 G/DL (ref 11.7–15.7)
IMM GRANULOCYTES # BLD: 0.1 10E3/UL
IMM GRANULOCYTES # BLD: 0.1 10E3/UL
IMM GRANULOCYTES NFR BLD: 1 %
IMM GRANULOCYTES NFR BLD: 1 %
IMP: NOT DETECTED
KLEBSIELLA OXYTOCA: NOT DETECTED
KLEBSIELLA PNEUMONIAE: NOT DETECTED
KPC: NOT DETECTED
LACTATE SERPL-SCNC: 1.5 MMOL/L (ref 0.7–2)
LYMPHOCYTES # BLD AUTO: 0.9 10E3/UL (ref 0.8–5.3)
LYMPHOCYTES # BLD AUTO: 1.1 10E3/UL (ref 0.8–5.3)
LYMPHOCYTES NFR BLD AUTO: 7 %
LYMPHOCYTES NFR BLD AUTO: 8 %
MAGNESIUM SERPL-MCNC: 2 MG/DL (ref 1.7–2.3)
MCH RBC QN AUTO: 31 PG (ref 26.5–33)
MCH RBC QN AUTO: 31.3 PG (ref 26.5–33)
MCHC RBC AUTO-ENTMCNC: 34.1 G/DL (ref 31.5–36.5)
MCHC RBC AUTO-ENTMCNC: 34.7 G/DL (ref 31.5–36.5)
MCV RBC AUTO: 90 FL (ref 78–100)
MCV RBC AUTO: 91 FL (ref 78–100)
MONOCYTES # BLD AUTO: 1 10E3/UL (ref 0–1.3)
MONOCYTES # BLD AUTO: 1 10E3/UL (ref 0–1.3)
MONOCYTES NFR BLD AUTO: 7 %
MONOCYTES NFR BLD AUTO: 8 %
NDM: NOT DETECTED
NEUTROPHILS # BLD AUTO: 11 10E3/UL (ref 1.6–8.3)
NEUTROPHILS # BLD AUTO: 11.8 10E3/UL (ref 1.6–8.3)
NEUTROPHILS NFR BLD AUTO: 84 %
NEUTROPHILS NFR BLD AUTO: 84 %
NRBC # BLD AUTO: 0 10E3/UL
NRBC # BLD AUTO: 0 10E3/UL
NRBC BLD AUTO-RTO: 0 /100
NRBC BLD AUTO-RTO: 0 /100
O2/TOTAL GAS SETTING VFR VENT: 0 %
OXA (DETECTED/NOT DETECTED): NOT DETECTED
OXYHGB MFR BLDV: 66 % (ref 70–75)
PCO2 BLDV: 30 MM HG (ref 40–50)
PH BLDV: 7.42 [PH] (ref 7.32–7.43)
PLATELET # BLD AUTO: 131 10E3/UL (ref 150–450)
PLATELET # BLD AUTO: 140 10E3/UL (ref 150–450)
PO2 BLDV: 34 MM HG (ref 25–47)
POTASSIUM SERPL-SCNC: 3.1 MMOL/L (ref 3.4–5.3)
POTASSIUM SERPL-SCNC: 3.2 MMOL/L (ref 3.4–5.3)
PROCALCITONIN SERPL IA-MCNC: 6.84 NG/ML
PROT SERPL-MCNC: 5 G/DL (ref 6.4–8.3)
PROTEUS SPECIES: NOT DETECTED
PSEUDOMONAS AERUGINOSA: NOT DETECTED
RBC # BLD AUTO: 3.45 10E6/UL (ref 3.8–5.2)
RBC # BLD AUTO: 3.55 10E6/UL (ref 3.8–5.2)
SAO2 % BLDV: 66.9 % (ref 70–75)
SODIUM SERPL-SCNC: 140 MMOL/L (ref 135–145)
SODIUM SERPL-SCNC: 142 MMOL/L (ref 135–145)
VIM: NOT DETECTED
WBC # BLD AUTO: 13.1 10E3/UL (ref 4–11)
WBC # BLD AUTO: 14 10E3/UL (ref 4–11)

## 2024-08-23 PROCEDURE — 258N000003 HC RX IP 258 OP 636

## 2024-08-23 PROCEDURE — 97530 THERAPEUTIC ACTIVITIES: CPT | Mod: GP

## 2024-08-23 PROCEDURE — 250N000013 HC RX MED GY IP 250 OP 250 PS 637: Performed by: INTERNAL MEDICINE

## 2024-08-23 PROCEDURE — 99232 SBSQ HOSP IP/OBS MODERATE 35: CPT | Performed by: HOSPITALIST

## 2024-08-23 PROCEDURE — 82040 ASSAY OF SERUM ALBUMIN: CPT

## 2024-08-23 PROCEDURE — 97116 GAIT TRAINING THERAPY: CPT | Mod: GP

## 2024-08-23 PROCEDURE — 250N000011 HC RX IP 250 OP 636

## 2024-08-23 PROCEDURE — 80048 BASIC METABOLIC PNL TOTAL CA: CPT

## 2024-08-23 PROCEDURE — 82805 BLOOD GASES W/O2 SATURATION: CPT

## 2024-08-23 PROCEDURE — 84145 PROCALCITONIN (PCT): CPT

## 2024-08-23 PROCEDURE — 97161 PT EVAL LOW COMPLEX 20 MIN: CPT | Mod: GP

## 2024-08-23 PROCEDURE — 250N000013 HC RX MED GY IP 250 OP 250 PS 637

## 2024-08-23 PROCEDURE — 250N000011 HC RX IP 250 OP 636: Performed by: INTERNAL MEDICINE

## 2024-08-23 PROCEDURE — 250N000013 HC RX MED GY IP 250 OP 250 PS 637: Performed by: HOSPITALIST

## 2024-08-23 PROCEDURE — 97535 SELF CARE MNGMENT TRAINING: CPT | Mod: GO

## 2024-08-23 PROCEDURE — 83735 ASSAY OF MAGNESIUM: CPT | Performed by: HOSPITALIST

## 2024-08-23 PROCEDURE — 99223 1ST HOSP IP/OBS HIGH 75: CPT | Performed by: INTERNAL MEDICINE

## 2024-08-23 PROCEDURE — 83605 ASSAY OF LACTIC ACID: CPT

## 2024-08-23 PROCEDURE — 120N000004 HC R&B MS OVERFLOW

## 2024-08-23 PROCEDURE — 97166 OT EVAL MOD COMPLEX 45 MIN: CPT | Mod: GO

## 2024-08-23 PROCEDURE — 85025 COMPLETE CBC W/AUTO DIFF WBC: CPT

## 2024-08-23 RX ORDER — TIZANIDINE 2 MG/1
2 TABLET ORAL EVERY 6 HOURS PRN
Status: DISCONTINUED | OUTPATIENT
Start: 2024-08-23 | End: 2024-08-24

## 2024-08-23 RX ORDER — POLYETHYLENE GLYCOL 3350 17 G/17G
17 POWDER, FOR SOLUTION ORAL 2 TIMES DAILY PRN
Status: DISCONTINUED | OUTPATIENT
Start: 2024-08-23 | End: 2024-08-24 | Stop reason: HOSPADM

## 2024-08-23 RX ORDER — POTASSIUM CHLORIDE 1500 MG/1
40 TABLET, EXTENDED RELEASE ORAL EVERY 4 HOURS
Status: COMPLETED | OUTPATIENT
Start: 2024-08-23 | End: 2024-08-23

## 2024-08-23 RX ORDER — POTASSIUM CHLORIDE 7.45 MG/ML
10 INJECTION INTRAVENOUS
Status: COMPLETED | OUTPATIENT
Start: 2024-08-23 | End: 2024-08-23

## 2024-08-23 RX ADMIN — POTASSIUM CHLORIDE 40 MEQ: 1500 TABLET, EXTENDED RELEASE ORAL at 11:54

## 2024-08-23 RX ADMIN — TIZANIDINE 2 MG: 2 TABLET ORAL at 20:07

## 2024-08-23 RX ADMIN — POTASSIUM CHLORIDE 40 MEQ: 1500 TABLET, EXTENDED RELEASE ORAL at 08:09

## 2024-08-23 RX ADMIN — ACETAMINOPHEN 650 MG: 325 TABLET ORAL at 01:16

## 2024-08-23 RX ADMIN — SODIUM CHLORIDE: 9 INJECTION, SOLUTION INTRAVENOUS at 05:21

## 2024-08-23 RX ADMIN — ACETAMINOPHEN 650 MG: 325 TABLET ORAL at 11:54

## 2024-08-23 RX ADMIN — POTASSIUM CHLORIDE 10 MEQ: 7.46 INJECTION, SOLUTION INTRAVENOUS at 02:27

## 2024-08-23 RX ADMIN — CEFTRIAXONE 1 G: 1 INJECTION, POWDER, FOR SOLUTION INTRAMUSCULAR; INTRAVENOUS at 16:51

## 2024-08-23 RX ADMIN — POTASSIUM CHLORIDE 10 MEQ: 7.46 INJECTION, SOLUTION INTRAVENOUS at 03:21

## 2024-08-23 RX ADMIN — APIXABAN 5 MG: 5 TABLET, FILM COATED ORAL at 20:08

## 2024-08-23 RX ADMIN — APIXABAN 5 MG: 5 TABLET, FILM COATED ORAL at 08:09

## 2024-08-23 RX ADMIN — Medication 1 MG: at 20:45

## 2024-08-23 ASSESSMENT — ACTIVITIES OF DAILY LIVING (ADL)
ADLS_ACUITY_SCORE: 33
HEARING_DIFFICULTY_OR_DEAF: YES
ADLS_ACUITY_SCORE: 29
ADLS_ACUITY_SCORE: 42
NUMBER_OF_TIMES_PATIENT_HAS_FALLEN_WITHIN_LAST_SIX_MONTHS: 1
DOING_ERRANDS_INDEPENDENTLY_DIFFICULTY: YES
ADLS_ACUITY_SCORE: 33
ADLS_ACUITY_SCORE: 29
TOILETING_ISSUES: NO
ADLS_ACUITY_SCORE: 33
ADLS_ACUITY_SCORE: 33
CHANGE_IN_FUNCTIONAL_STATUS_SINCE_ONSET_OF_CURRENT_ILLNESS/INJURY: NO
ADLS_ACUITY_SCORE: 29
WALKING_OR_CLIMBING_STAIRS_DIFFICULTY: NO
ADLS_ACUITY_SCORE: 33
ADLS_ACUITY_SCORE: 33
DESCRIBE_HEARING_LOSS: BILATERAL HEARING LOSS
ADLS_ACUITY_SCORE: 29
WEAR_GLASSES_OR_BLIND: YES
ADLS_ACUITY_SCORE: 33
ADLS_ACUITY_SCORE: 31
DIFFICULTY_COMMUNICATING: NO
ADLS_ACUITY_SCORE: 33
ADLS_ACUITY_SCORE: 33
DRESSING/BATHING_DIFFICULTY: NO
DIFFICULTY_EATING/SWALLOWING: NO
DEPENDENT_IADLS:: INDEPENDENT
ADLS_ACUITY_SCORE: 33
ADLS_ACUITY_SCORE: 31
ADLS_ACUITY_SCORE: 33
FALL_HISTORY_WITHIN_LAST_SIX_MONTHS: YES
CONCENTRATING,_REMEMBERING_OR_MAKING_DECISIONS_DIFFICULTY: NO
WERE_AUXILIARY_AIDS_OFFERED?: NO
ADLS_ACUITY_SCORE: 33
USE_OF_HEARING_ASSISTIVE_DEVICES: RIGHT HEARING AID;LEFT HEARING AID
ADLS_ACUITY_SCORE: 42
ADLS_ACUITY_SCORE: 33
PATIENT'S_PREFERRED_MEANS_OF_COMMUNICATION: VERBAL
ADLS_ACUITY_SCORE: 42
ADLS_ACUITY_SCORE: 31

## 2024-08-23 NOTE — CONSULTS
CRITICAL CARE CONSULT:    Reason for Consult:  Septic shock    Assessment/Plan:  84 year old female never smoker with a history of CKD, chronic left leg DVT, left ventricular thrombus, pancreatic mass, dyslipidemia, osteopenia, right rib fractures in July 2024, presented on 8/22 with one week of fatigue, presented to ED from clinic where she was undergoing preop for pancreatic mass procedure, sent to ED, found to have UTI with gram negative bacteremia and septic shock.    PULMONARY/RESPIRATORY:  No acute issues  monitor    CARDIOVASCULAR:  Septic shock  Received >3 liters crystalloid, briefly on norepi, now off, lactate up to 4.9, now normalized.  Off pressor  Currently on IVF, should be able to take PO diet, will defer to primary team    NEUROLOGIC:  No acute issues  monitor    GASTROINTESTINAL:  No acute issues  monitor    RENAL/GENITOURINARY:  ОЛЕГ on CKD  Baseline Cr 1.1. Up to 2.4 on admission due to ATN from septic shock, UTI, down to 2.0.  Supportive care  Monitor BUN, Cr, UOP, I/O, weight    INFECTIOUS DISEASE:  UTI with gram negative bacteremia  Speciation in process.  Continue ceftriaxone  Consider repeating blood culture    HEMATOLOGIC/ONCOLOGIC:  Anemia  H/o chronic left leg DVT  H/o left apical thrombus  Apixaban on hold d/t hematuria.  Consider restarting apixaban    ENDOCRINE:  No acute issues  monitor    Checklist:  FEN: NPO, should be able to take PO  VTE ppx: SCDs  GI ppx: NA  Bowel regimen: per primary team  VAP ppx: NA  Lines/tube-size:  LUE PICC placed on 8/22  Skin: monitor   PT/OT/SLP, early mobility: yes  Code Status: full  Communication: Communicated directly with the patient  Disposition: The patient is clinically stable to transfer out of ICU. Critical care will sign off but please call if needed.    Restraints  NA    Anselmo Rosado MD  Pulmonary and Critical Care Medicine  [facility]  Office [phone]  he/him    History of Present Illness:  84 year old female never smoker  "with a history of CKD, chronic left leg DVT, left ventricular thrombus, pancreatic mass, dyslipidemia, osteopenia, right rib fractures in July 2024, presented on 8/22 with one week of fatigue, presented to ED from clinic where she was undergoing preop for pancreatic mass procedure, sent to ED, found to have UTI with gram negative bacteremia and septic shock. One week of fatigue, has some leg cramps, bloody urine. Found to have UTI and gram negative bacteremia. Resuscitated, lactate up, on pressor briefly, now off. Lactate normalized.    Medical & Surgical History:  never smoker with a history of CKD, chronic left leg DVT, left ventricular thrombus, pancreatic mass, dyslipidemia, osteopenia, right rib fractures in July 2024       Family & Social History:  Reviewed in the electronic medical record    Medications:  Reviewed in the electronic medical record.     Allergies:  Reviewed in the electronic medical record.     Physical Exam:  Vent settings for last 24 hours:  Resp: 16    /52   Pulse 67   Temp 97  F (36.1  C) (Oral)   Resp 16   Ht 1.676 m (5' 6\")   Wt 70.6 kg (155 lb 11.2 oz)   SpO2 97%   BMI 25.13 kg/m      Intake/Output last 3 shifts:  I/O last 3 completed shifts:  In: 3885.12 [P.O.:100; I.V.:1285.12; IV Piggyback:2500]  Out: 450 [Urine:450]  Intake/Output this shift:  No intake/output data recorded.    Physical Exam  Physical Exam:  Gen: alert, oriented, no distress  HEENT: NT, no YULISSA  CV: RRR, no m/g/r  Resp: CTAB  Abd: soft, nontender, BS+  Skin: no rashes or lesions  Ext: no edema  Neuro: PERRL, nonfocal exam    IMAGING:  CT A/P (8/22/24):  - images directly reviewed, formal interpretation follows:  FINDINGS:      LOWER CHEST: Subsegmental atelectasis.      HEPATOBILIARY: Cholelithiasis. No biliary ductal dilation. No liver lesions.      PANCREAS: Known cystic lesion within the pancreatic body/tail is not well visualized due to the noncontrast technique. No peripancreatic inflammation or " fluid collections.      SPLEEN: Normal.     ADRENAL GLANDS: Normal.     KIDNEYS/BLADDER: Bilateral perinephric edema. Nonobstructing 1.0 x 0.7 x 1.5 cm calculus at the lower pole of the left kidney. Nonobstructing 0.2 cm calculus at the lower pole of the right kidney. No ureteral calculi or hydronephrosis. Normal bladder. No   intraluminal bladder calculi.      BOWEL: No bowel obstruction or inflammation. Sigmoid colonic diverticulosis. No inflamed diverticuli.      LYMPH NODES: No enlarged lymph nodes.      VASCULATURE: Moderate aortobiiliac sclerosis. No abdominal aortic aneurysm.      PELVIC ORGANS: Normal.     MUSCULOSKELETAL: Thoracolumbar dextroscoliosis with multilevel degenerative changes of the spine. Degenerative changes of the right hip. No acute bony abnormality or destructive bone lesions. Tiny fat-containing periumbilical hernia.                                                                       IMPRESSION:      1.  No definite acute abnormality, within the limitations of the noncontrast technique.     2.  Nonobstructing 1.5 cm left renal calculus. There is also a nonobstructing 0.2 cm right renal calculus. No hydronephrosis.      3.  Cholelithiasis.     4.  Sigmoid colonic diverticulosis. No inflamed diverticuli.     5.  A known cystic pancreatic lesion is not well visualized due to the noncontrast technique.    CXR (8/22/24):  - images directly reviewed, formal interpretation follows:  IMPRESSION: Lungs are clear. Heart and pulmonary vascularity are normal. No signs of acute disease.    All pertinent vital signs, I&Os, laboratory, microbiology, ECGs, & imaging data have been personally reviewed. Total critical care time, excluding procedures, was 60 minutes.    Clinically Significant Risk Factors Present on Admission        # Hypokalemia: Lowest K = 3.1 mmol/L in last 2 days, will replace as needed       # Hypoalbuminemia: Lowest albumin = 2.6 g/dL at 8/23/2024  6:32 AM, will monitor as  "appropriate  # Drug Induced Coagulation Defect: home medication list includes an anticoagulant medication    # Hypertension: Noted on problem list   # Circulatory Shock: required vasopressors within past 24 hours     # Anemia: based on hgb <11       # Overweight: Estimated body mass index is 25.13 kg/m  as calculated from the following:    Height as of this encounter: 1.676 m (5' 6\").    Weight as of this encounter: 70.6 kg (155 lb 11.2 oz).                 # Anemia: based on hgb <11                 "

## 2024-08-23 NOTE — PROGRESS NOTES
"[facility]    Medicine Progress Note - Hospitalist Service    Date of Admission:  8/22/2024    Assessment & Plan   Kalpana Manzo is a 84 year old female admitted with septic shock due to a complicated UTI.  She has had interval improvement in hemodynamics.  Blood cx demonstrating GNRs.  Continue empiric abx pending further culture data.    # Septic shock 2/2 complicated UTI  # GNR bacteremia  - empiric CTX  - cx pending    # ОЛЕГ  - monitor BMP    # Bilateral nephrolithiasis  # Gross hematuria  - monitor clinically  - outpatient urology followup    # Hx LV thrombus  - resume anticoagulation              Diet: NPO for Medical/Clinical Reasons Except for: Meds      Rubio Catheter: Not present  Lines: PRESENT      PICC 08/22/24 Triple Lumen Left Basilic-Site Assessment: WDL      Cardiac Monitoring: None  Code Status: Full Code      Clinically Significant Risk Factors Present on Admission        # Hypokalemia: Lowest K = 3.1 mmol/L in last 2 days, will replace as needed       # Hypoalbuminemia: Lowest albumin = 2.6 g/dL at 8/23/2024  6:32 AM, will monitor as appropriate  # Drug Induced Coagulation Defect: home medication list includes an anticoagulant medication    # Hypertension: Noted on problem list   # Circulatory Shock: required vasopressors within past 24 hours     # Anemia: based on hgb <11       # Overweight: Estimated body mass index is 25.13 kg/m  as calculated from the following:    Height as of this encounter: 1.676 m (5' 6\").    Weight as of this encounter: 70.6 kg (155 lb 11.2 oz).                    Disposition Plan     Medically Ready for Discharge: Anticipated in 2-4 Days             Jerrod Rodríguez MD  Hospitalist Service  [facility]  Securely message with [employer] (more info)  Text page via [facility] Paging/Directory   ______________________________________________________________________    Interval History   Denies chest pain, chest pressure, dyspnea, " nausea, or abdominal pain.    Physical Exam   Vital Signs: Temp: 97  F (36.1  C) Temp src: Oral BP: 103/52 Pulse: 67   Resp: 16 SpO2: 97 % O2 Device: None (Room air)    Weight: 155 lbs 11.2 oz    Gen:  sitting in bed in no extremis  Neuro: alert, conversant  CV:  nl rate, regular rhythm  Pulm: No acute resp distress, ctab anteriorly  GI:  abdomen soft, very mild central and right sided TTP    Medical Decision Making             Data   Reviewed:    Na 142  K 3.2  BUN 37  Cr 2    WBC 13  Hgb 11  Plts 131

## 2024-08-23 NOTE — PLAN OF CARE
Problem: UTI (Urinary Tract Infection)  Goal: Improved Infection Symptoms  Outcome: Progressing     Problem: Sepsis/Septic Shock  Goal: Absence of Infection Signs and Symptoms  Outcome: Progressing  Intervention: Initiate Sepsis Management  Recent Flowsheet Documentation  Taken 8/23/2024 0415 by Luz Roy, RN  Infection Prevention: rest/sleep promoted  Taken 8/23/2024 0037 by Luz Roy, RN  Infection Prevention: rest/sleep promoted  Intervention: Promote Recovery  Recent Flowsheet Documentation  Taken 8/23/2024 0600 by Luz Roy, RN  Activity Management: bedrest  Taken 8/23/2024 0415 by Luz Roy, RN  Sleep/Rest Enhancement:   comfort measures   noise level reduced   regular sleep/rest pattern promoted   room darkened  Activity Management: activity adjusted per tolerance  Taken 8/23/2024 0200 by Luz Roy, RN  Activity Management: bedrest  Taken 8/23/2024 0037 by Luz Roy, RN  Sleep/Rest Enhancement:   comfort measures   noise level reduced   regular sleep/rest pattern promoted   room darkened   Goal Outcome Evaluation:

## 2024-08-23 NOTE — SIGNIFICANT EVENT
Cross cover significant Event Note - RN informed me of extravasation while on Levophed - right arm    Remote doc was informed earlier by RN and looks like -  Regitine was already given while in ED around 11PM    Seen bedside. Awake and not toxic looking.   Swelling noted distal right forearm. Mild redness. Swelling is not circumferential. No bullae. No pain. No crepitus. PIV was removed already.     A - Levophed extravasation s/p Regitine - right distal forearm      Plans  - Infiltration/extravasation order set.

## 2024-08-23 NOTE — PROVIDER NOTIFICATION
Paged provider about low BP. Bolus ordered. Pt receiving IV bolus still having low BP's, another bolus ordered plus PICC ordered place. Pt asymptomatic at this time. Recent BP is 86/48 MAP of 62.  Per provider if pt's BP is still low even with fluids pt will need pressors. Charge nurse made aware. Report given to Rama BACK.   Nayeli Coker RN on 8/22/2024 at 7:23 PM

## 2024-08-23 NOTE — PROCEDURES
[facility]    Triple Lumen PICC Placement    Date/Time: 8/22/2024 11:54 PM    Performed by: Keyonna De La Cruz RN  Authorized by: Jamee Saunders PA-C  Indications: vascular access      UNIVERSAL PROTOCOL   Site Marked: NA  Prior Images Obtained and Reviewed:  NA  Required items: Required blood products, implants, devices and special equipment available    Patient identity confirmed:  Verbally with patient, arm band and provided demographic data  NA - No sedation, light sedation, or local anesthesia  Confirmation Checklist:  Patient's identity using two indicators, relevant allergies, procedure was appropriate and matched the consent or emergent situation and correct equipment/implants were available  Time out: Immediately prior to the procedure a time out was called    Universal Protocol: the Joint Commission Universal Protocol was followed    Preparation: Patient was prepped and draped in usual sterile fashion    ESBL (mL):  1     ANESTHESIA    Anesthesia:  Local infiltration  Local Anesthetic:  Lidocaine 1% without epinephrine  Anesthetic Total (mL):  0.4      SEDATION    Patient Sedated: No        Preparation: skin prepped with 2% chlorhexidine  Skin prep agent: skin prep agent completely dried prior to procedure  Sterile barriers: maximum sterile barriers were used: cap, mask, sterile gown, sterile gloves, and large sterile sheet  Hand hygiene: hand hygiene performed prior to central venous catheter insertion  Type of line used: PICC  Catheter type: triple lumen  Lumen type: valved and power PICC  Lumen Identification: Gray, Red and White  Catheter size: 5 Fr  Brand: Bard  Lot number: [id number]  Placement method: MST and ultrasound  Number of attempts: 1  Difficulty threading catheter: no  Successful placement: yes  Orientation: left  Catheter to Vein (%): 13  Location: basilic vein  Tip Location: SVC (Low SVC)  Arm circumference: adults 10 cm  Extremity circumference: 30  Visible  catheter length: 5  Total catheter length: 47  Dressing and securement: antibiotic disc placed, statlock and transparent dressing  Post procedure assessment: blood return through all ports, free fluid flow and placement verified by x-ray  PROCEDURE   Patient Tolerance:  Patient tolerated the procedure well with no immediate complicationsDescribe Procedure: Consent per patient. Accessed left upper arm basilic on first attempt using ultrasound guidance. Catheter advanced without difficulty. Brisk blood return and flushes easily all lumens. Placement confirmed by xray. First image line appeared deep. Sterile dressing change done and line pulled back 2cm for a total of 4cm external, (5cm from insertion to hub.) Line secured and dressed, image repeated. Second read had left PICC tip in low SVC. Brisk blood return and flushes easily all lumens. Patient tolerated procedure well. Educational material left with patient. Primary RN aware PICC is good to use.   Disposal: sharps and needle count correct at the end of procedure, needles and guidewire disposed in sharps container

## 2024-08-23 NOTE — PROGRESS NOTES
08/23/24 1310   Appointment Info   Signing Clinician's Name / Credentials (OT) Bridget Estrella OTD OTR/L   Living Environment   People in Home spouse   Current Living Arrangements house  ([facility])   Home Accessibility stairs to enter home   Number of Stairs, Main Entrance 2   Living Environment Comments Walk in shower, comfort height toilets   Self-Care   Equipment Currently Used at Home other (see comments)  (owns a cane - no AD at baseline)   Fall history within last six months yes   Number of times patient has fallen within last six months 1   Activity/Exercise/Self-Care Comment Typically ind with all ADLs and most IADLs - splits with    General Information   Onset of Illness/Injury or Date of Surgery 08/22/24   Referring Physician Jerrod Rodríguez MD   Patient/Family Therapy Goal Statement (OT) To return home   Additional Occupational Profile Info/Pertinent History of Current Problem Kalpana Manzo is a 84 year old female admitted with septic shock due to a complicated UTI.   Existing Precautions/Restrictions fall   Limitations/Impairments safety/cognitive   Cognitive Status Examination   Orientation Status orientation to person, place and time   Affect/Mental Status (Cognitive) WFL   Follows Commands initiation impaired;repetition of directions required   Cognitive Status Comments Min confusion noted - continue to monitor throughout session   Visual Perception   Visual Impairment/Limitations corrective lenses full-time   Posture   Posture not impaired   Range of Motion Comprehensive   General Range of Motion bilateral upper extremity ROM WNL   Strength Comprehensive (MMT)   General Manual Muscle Testing (MMT) Assessment no strength deficits identified   Bed Mobility   Bed Mobility supine-sit   Supine-Sit [place] (Bed Mobility) minimum assist (75% patient effort)   Assistive Device (Bed Mobility) bed rails   Transfers   Transfers sit-stand transfer;toilet transfer   Sit-Stand Transfer   Sit-Stand  [place] (Transfers) supervision   Assistive Device (Sit-Stand Transfers) walker, front-wheeled   Toilet Transfer   [place] Level (Toilet Transfer) contact guard   Assistive Device (Toilet Transfer) walker, front-wheeled   Balance   Balance Assessment standing dynamic balance   Standing Balance: Dynamic contact guard   Activities of Daily Living   BADL Assessment/Intervention lower body dressing;toileting;grooming   Lower Body Dressing Assessment/Training   [place] Level (Lower Body Dressing) minimum assist (75% patient effort)   Grooming Assessment/Training   [place] Level (Grooming) contact guard assist   Toileting   [place] Level (Toileting) contact guard assist   Clinical Impression   Criteria for Skilled Therapeutic Interventions Met (OT) Yes, treatment indicated   OT Diagnosis Decreased ind with ADLs and safety   Influenced by the following impairments UTI/sepsis   OT Problem List-Impairments impacting ADL strength;mobility;activity tolerance impaired;cognition   Assessment of Occupational Performance 3-5 Performance Deficits   Identified Performance Deficits dressing, toileting, bathing, cognition, fxl transfers   Planned Therapy Interventions (OT) ADL retraining;bed mobility training;balance training;strengthening;cognition;progressive activity/exercise;risk factor education;transfer training   Clinical Decision Making Complexity (OT) detailed assessment/moderate complexity   Risk & Benefits of therapy have been explained evaluation/treatment results reviewed;care plan/treatment goals reviewed;risks/benefits reviewed;current/potential barriers reviewed;patient   OT Total Evaluation Time   OT Eval, Moderate Complexity Minutes (53672) 12   OT Goals   Therapy Frequency (OT) Daily   OT Predicted Duration/Target Date for Goal Attainment 08/30/24   OT Goals Lower Body Dressing;Hygiene/Grooming;Toilet Transfer/Toileting;Cognition   OT: Hygiene/Grooming modified independent;while standing    OT: Lower Body Dressing Modified independent   OT: Toilet Transfer/Toileting Modified independent;toilet transfer;cleaning and garment management   OT: Cognitive Patient/caregiver will verbalize understanding of cognitive assessment results/recommendations as needed for safe discharge planning   Self-Care/Home Management   Self-Care/Home Mgmt/ADL, Compensatory, Meal Prep Minutes (42006) 24   Symptoms Noted During/After Treatment (Meal Preparation/Planning Training) none   Treatment Detail/Skilled Intervention Eval completed, treatment initiated. Fxl mob in room, min cueing for attention to task, cueing for hand placement with toilet transfer, toileting completed CGA, min A STS from lower surface toilet. Fxl mob in hallway to simulate household distances CGA w/ FWW, progressed to SBA w/ FWW, min cueing for safety. ~250 feet total, last 50 feet completes CGA with no AD, min cueing for safety and hand placement with chair transfer. Extensive educ provided re: OOB activities, toileting from commode/toilet vs. purewick to promote activity, pt agreeable. Left with call light in reach.   OT Discharge Planning   OT Plan Progress standing tolerance, g/h standing, LB dressing, toileting, monitor cognition   OT Discharge Recommendation (DC Rec) home with home care occupational therapy;home with assist   OT Rationale for DC Rec Pt requires SBA for ADLs and fxl mob at this time, has assist from  at home, may benefit from home OT eval to enhance ind and safety   OT Brief overview of current status SBA fxl mob w/ FWW, CGA w/o FWW, CGA toileting   Total Session Time   Timed Code Treatment Minutes 24   Total Session Time (sum of timed and untimed services) 36

## 2024-08-23 NOTE — PROGRESS NOTES
Brief consult note, follow-up to follow    84-year-old female presenting with sepsis of urinary origin.  CT scan shows a nonobstructing stone in the lower pole of the left kidney.      no acute intervention needed.  Will have to discuss in clinic, recurrent UTIs and whether we are treating the stone as it could be a potential source of infections.  I will arrange for a follow-up in the next couple weeks.

## 2024-08-23 NOTE — PROGRESS NOTES
"   08/23/24 0946   Appointment Info   Signing Clinician's Name / Credentials (PT) Ghada Solis, PT, DPT   Living Environment   People in Home spouse   Current Living Arrangements   ([facility])   Home Accessibility stairs to enter home   Number of Stairs, Main Entrance 2   Stair Railings, Main Entrance railings on both sides of stairs   Self-Care   Equipment Currently Used at Home none   Fall history within last six months yes   Number of times patient has fallen within last six months 1   Activity/Exercise/Self-Care Comment pt reported being independent with functional mobility - ambulating household & community distances   General Information   Onset of Illness/Injury or Date of Surgery 08/22/24   Referring Physician Jamee Saunders, KARLIE   Patient/Family Therapy Goals Statement (PT) Return to Home   Pertinent History of Current Problem (include personal factors and/or comorbidities that impact the POC) Per Chart Review recent medicine note-\"84 year old female admitted with septic shock due to a complicated UTI.  She has had interval improvement in hemodynamics.  Blood cx demonstrating GNRs.  Continue empiric abx pending further culture data.\"   Existing Precautions/Restrictions fall   Range of Motion (ROM)   Range of Motion ROM is WFL   Strength (Manual Muscle Testing)   Strength (Manual Muscle Testing) Deficits observed during functional mobility   Bed Mobility   Bed Mobility supine-sit   Supine-Sit [place] (Bed Mobility) verbal cues;supervision;moderate assist (50% patient effort)   Transfers   Transfers sit-stand transfer   Maintains Weight-bearing Status (Transfers) able to maintain   Sit-Stand Transfer   Sit-Stand [place] (Transfers) supervision;verbal cues;minimum assist (75% patient effort)   Assistive Device (Sit-Stand Transfers) walker, front-wheeled   Gait/Stairs (Locomotion)   [place] Level (Gait) supervision;verbal cues;contact guard   Assistive Device (Gait) walker, front-wheeled "   Distance in Feet (Gait) 5   Pattern (Gait) step-through;swing-through   Deviations/Abnormal Patterns (Gait) gait speed decreased;sharon decreased   Clinical Impression   Criteria for Skilled Therapeutic Intervention Yes, treatment indicated   PT Diagnosis (PT) Impaired Functional Mobility   Influenced by the following impairments weakness, impaired balance   Functional limitations due to impairments gait, transfers, stairs   Clinical Presentation (PT Evaluation Complexity) stable   Clinical Presentation Rationale pt presents as medically diagnosed   Clinical Decision Making (Complexity) low complexity   Planned Therapy Interventions (PT) balance training;bed mobility training;gait training;home exercise program;neuromuscular re-education;ROM (range of motion);stair training;strengthening;transfer training;home program guidelines   Risk & Benefits of therapy have been explained evaluation/treatment results reviewed;patient   PT Total Evaluation Time   PT Eval, Low Complexity Minutes (96328) 10   Physical Therapy Goals   PT Frequency 6x/week   PT Predicted Duration/Target Date for Goal Attainment 08/29/24   PT Goals Transfers;Gait;Stairs   PT: Transfers Supervision/stand-by assist;Within precautions;Sit to/from stand   PT: Gait Supervision/stand-by assist;Within precautions;50 feet   PT: Stairs 2 stairs;Rail on both sides;Minimal assist   Interventions   Interventions Quick Adds Gait Training;Therapeutic Activity   Therapeutic Activity   Therapeutic Activities: dynamic activities to improve functional performance Minutes (30815) 15   Symptoms Noted During/After Treatment Fatigue   Treatment Detail/Skilled Intervention Additional sit to/from stand: cueing for safety/technique/hand placement on stable surface, CGA d/t instability. Sit to/from supine: cueing for safety/technique/use of bed railings, Min A; sitting balance EOB: cueing for safety/technique/posture, CGA to SBA; standing balance: cueing for  safety/technique/posture - added challenges of lateral weight shifting, CGA: transfer bed to chair: cueing for safety/technique/FWW management, CGA; Increased time for management of lines/tubes. chair alarm on and call light within reach at end of session.   Gait Training   Gait Training Minutes (30332) 10   Symptoms Noted During/After Treatment (Gait Training) fatigue   Treatment Detail/Skilled Intervention pt ambulated in hallway with FWW. cueing for safety/technique/FWW management. Use of FWW d/t instability.   Distance in Feet 80   [place] Level (Gait Training) contact guard   Physical Assistance Level (Gait Training) supervision;verbal cues   Weight Bearing (Gait Training) full weight-bearing   Assistive Device (Gait Training) rolling walker   Pattern Analysis (Gait Training) swing-through gait   Gait Analysis Deviations decreased sharon;decreased step length   Impairments (Gait Analysis/Training) balance impaired;strength decreased   PT Discharge Planning   PT Plan gait as karrie with FWW, stairs, LE Therex   PT Discharge Recommendation (DC Rec) Transitional Care Facility   PT Rationale for DC Rec pt requires increased assist with functional mobility. Instability noted with out of bed activity leading to an increased risk for falls. TCU for improving activity tolerance/strength/balance   PT Brief overview of current status CGA gait with FWW 80' & sit to/from stand; Min A with bed mobility. Instability noted with out of bed activity   Total Session Time   Timed Code Treatment Minutes 25   Total Session Time (sum of timed and untimed services) 35

## 2024-08-23 NOTE — SIGNIFICANT EVENT
Significant Event Note    Time of event: 8:01 PM August 22, 2024    Description of event:  Pt remained hypotensive w/ MAP <65 despite 3.5L NS bolus     Plan:  IV norepinephrine initiated, PICC team consulted and plans for PICC placement in about 1 hr  consultation to Intensivist placed  Spoke with Urology, no need for surgical intervention at this time as there is no evidence of hydronephrosis   Admission to ICU     Discussed with:pt, pts  at bedside and Attending    Jamee Saunders PA-C

## 2024-08-23 NOTE — CONSULTS
Care Management Initial Consult    General Information  Assessment completed with: Patient, Spouse or significant other, pt and Adebayo  Type of CM/SW Visit: Initial Assessment    Primary Care Provider verified and updated as needed: Yes   Readmission within the last 30 days: no previous admission in last 30 days      Reason for Consult: discharge planning  Advance Care Planning: Advance Care Planning Reviewed: present on chart, no concerns identified, verified with patient          Communication Assessment  Patient's communication style: spoken language (English or Bilingual)    Hearing Difficulty or Deaf: yes   Wear Glasses or Blind: yes    Cognitive  Cognitive/Neuro/Behavioral: WDL  Level of Consciousness: alert     Orientation: oriented x 4  Mood/Behavior: calm, cooperative     Speech: clear    Living Environment:   People in home: spouse  Adebayo  Current living Arrangements: town home      Able to return to prior arrangements: yes       Family/Social Support:  Care provided by: self, spouse/significant other  Provides care for: no one  Marital Status:   , Friend  Adebayo       Description of Support System: Supportive         Current Resources:   Patient receiving home care services: No     Community Resources: None  Equipment currently used at home: none  Supplies currently used at home: Hearing Aid Batteries    Employment/Financial:  Employment Status: retired        Financial Concerns: none   Referral to Financial Worker: No       Does the patient's insurance plan have a 3 day qualifying hospital stay waiver?  Yes     Which insurance plan 3 day waiver is available? Alternative insurance waiver    Will the waiver be used for post-acute placement? Undetermined at this time    Lifestyle & Psychosocial Needs:  Social Determinants of Health     Food Insecurity: Low Risk  (8/23/2024)    Food Insecurity     Within the past 12 months, did you worry that your food would run out before you got money to buy more?:  No     Within the past 12 months, did the food you bought just not last and you didn t have money to get more?: No   Depression: Not at risk (7/30/2024)    PHQ-2     PHQ-2 Score: 0   Housing Stability: Low Risk  (8/23/2024)    Housing Stability     Do you have housing? : Yes     Are you worried about losing your housing?: No   Tobacco Use: Low Risk  (8/22/2024)    Patient History     Smoking Tobacco Use: Never     Smokeless Tobacco Use: Never     Passive Exposure: Not on file   Financial Resource Strain: Low Risk  (8/23/2024)    Financial Resource Strain     Within the past 12 months, have you or your family members you live with been unable to get utilities (heat, electricity) when it was really needed?: No   Alcohol Use: Not on file   Transportation Needs: Low Risk  (8/23/2024)    Transportation Needs     Within the past 12 months, has lack of transportation kept you from medical appointments, getting your medicines, non-medical meetings or appointments, work, or from getting things that you need?: No   Physical Activity: Not on file   Interpersonal Safety: Low Risk  (8/23/2024)    Interpersonal Safety     Do you feel physically and emotionally safe where you currently live?: Yes     Within the past 12 months, have you been hit, slapped, kicked or otherwise physically hurt by someone?: No     Within the past 12 months, have you been humiliated or emotionally abused in other ways by your partner or ex-partner?: No   Stress: Not on file   Social Connections: Not on file   Health Literacy: Not on file       Functional Status:  Prior to admission patient needed assistance:   Dependent ADLs:: Independent  Dependent IADLs:: Independent       Mental Health Status:  Mental Health Status: No Current Concerns       Chemical Dependency Status:  Chemical Dependency Status: No Current Concerns             Values/Beliefs:  Spiritual, Cultural Beliefs, Uatsdin Practices, Values that affect care: no       Cultural/Uatsdin  Practices Patient Routinely Participates In: prayer       Additional Information:  Kalpana Manzo is a(n) 84 year old year old female who presented with UTI (urinary tract infection) [N39.0]  Severe sepsis (H) [A41.9, R65.20].     CM met with pt and her  at bedside. Introduced self and role. Spouse/Partner, Adebayo, assisted with completing assessment. Patient lives in a(n) town home with her , Adebayo.. Patient is independent with IADL/ADLs. Anticipated that patient will discharge to home with family.     Pt and  deny any further needs at this time.    PT is recommending TCU. Pt  is declining TCU and home care at this time.     Contacts:  Extended Emergency Contact Information  Primary Emergency Contact: Adebayo Manzo  Address: [facility] [place] [address] [place] [employer]  Mobile Phone: [phone]  Relation: Spouse  Secondary Emergency Contact: Gucci Orellana  Address: [address] [place] [address] [place]  Mobile Phone: [phone]  Relation: Daughter    No further care management intervention anticipated at this time. Please re-consult if further needs arise. Care management signing off.      Cooper Peters RN

## 2024-08-23 NOTE — PROGRESS NOTES
Brief ICU Note  Chart review and discussed with hospitalist    Admitted for severe sepsis with shock requiring pressors likely due to UTI. Two bilateral non obstructing kidney stones with no evidence of obstructive uropathy.    Good lactate clearance.    Appears to be adequately fluid resuscitated and no oxygen needs    Will follow to help with pressor management    Mahendra Lay MD  Pulmonary, Critical Care and Sleep Medicine  [facility]  Pager: [phone]

## 2024-08-23 NOTE — PROGRESS NOTES
Pt admitted to #320 for urospesis. Pt is alert, occasionally forgetful, pleasant and can make needs known. SR with frequent PVC's. IV @ 125cc/hr & Titrating Levophed to keep MAP>65 both per PCC. RFA infiltrate treated in ED and no further changes noted from handoff with ED RN. RUE elevated, site marked for further monitoring and warm packed. Purewick in place and pt. Voiding dark sonal urine. C/o 9/10 cramping pain to LLE and tylenol given. Pt has known chronic blood clot to LLE and is on Eliquis.     Pt has had a stable shift. Slept well at intervals. Off levo since 5:20 with MAP's>65. SR with less ectopy after K+ replacement. Iv@ 125cc/hr. UOP per purewick dark sonal urine w/o stones noted when strained. +BC report received  [facility] updated.   called per pt request & update given.

## 2024-08-24 ENCOUNTER — TELEPHONE (OUTPATIENT)
Dept: SURGERY | Facility: CLINIC | Age: 84
End: 2024-08-24
Payer: COMMERCIAL

## 2024-08-24 VITALS
OXYGEN SATURATION: 96 % | SYSTOLIC BLOOD PRESSURE: 117 MMHG | RESPIRATION RATE: 17 BRPM | HEART RATE: 67 BPM | DIASTOLIC BLOOD PRESSURE: 63 MMHG | TEMPERATURE: 97.7 F | WEIGHT: 152.3 LBS | HEIGHT: 66 IN | BODY MASS INDEX: 24.48 KG/M2

## 2024-08-24 DIAGNOSIS — N39.0 RECURRENT UTI: Primary | ICD-10-CM

## 2024-08-24 PROBLEM — R65.21 SEPTIC SHOCK (H): Status: ACTIVE | Noted: 2024-08-22

## 2024-08-24 LAB
ANION GAP SERPL CALCULATED.3IONS-SCNC: 11 MMOL/L (ref 7–15)
BUN SERPL-MCNC: 31.3 MG/DL (ref 8–23)
CALCIUM SERPL-MCNC: 8.2 MG/DL (ref 8.8–10.4)
CHLORIDE SERPL-SCNC: 110 MMOL/L (ref 98–107)
CREAT SERPL-MCNC: 1.47 MG/DL (ref 0.51–0.95)
EGFRCR SERPLBLD CKD-EPI 2021: 35 ML/MIN/1.73M2
GLUCOSE SERPL-MCNC: 142 MG/DL (ref 70–99)
HCO3 SERPL-SCNC: 18 MMOL/L (ref 22–29)
POTASSIUM SERPL-SCNC: 3.6 MMOL/L (ref 3.4–5.3)
SODIUM SERPL-SCNC: 139 MMOL/L (ref 135–145)

## 2024-08-24 PROCEDURE — 99222 1ST HOSP IP/OBS MODERATE 55: CPT | Performed by: STUDENT IN AN ORGANIZED HEALTH CARE EDUCATION/TRAINING PROGRAM

## 2024-08-24 PROCEDURE — 250N000013 HC RX MED GY IP 250 OP 250 PS 637

## 2024-08-24 PROCEDURE — 80048 BASIC METABOLIC PNL TOTAL CA: CPT | Performed by: HOSPITALIST

## 2024-08-24 PROCEDURE — 99238 HOSP IP/OBS DSCHRG MGMT 30/<: CPT | Performed by: HOSPITALIST

## 2024-08-24 RX ORDER — CIPROFLOXACIN 500 MG/1
500 TABLET, FILM COATED ORAL 2 TIMES DAILY
Status: DISCONTINUED | OUTPATIENT
Start: 2024-08-24 | End: 2024-08-24 | Stop reason: HOSPADM

## 2024-08-24 RX ORDER — CIPROFLOXACIN 500 MG/1
500 TABLET, FILM COATED ORAL 2 TIMES DAILY
Qty: 24 TABLET | Refills: 0 | Status: SHIPPED | OUTPATIENT
Start: 2024-08-24 | End: 2024-09-05

## 2024-08-24 RX ADMIN — ACETAMINOPHEN 650 MG: 325 TABLET ORAL at 04:00

## 2024-08-24 ASSESSMENT — ACTIVITIES OF DAILY LIVING (ADL)
ADLS_ACUITY_SCORE: 33

## 2024-08-24 NOTE — PROGRESS NOTES
Occupational Therapy Discharge Summary    Reason for therapy discharge:    Discharged to home with home therapy.    Progress towards therapy goal(s). See goals on Care Plan in [facility] electronic health record for goal details.  Goals partially met.  Barriers to achieving goals:   discharge from facility.    Therapy recommendation(s):    Continued therapy is recommended.  Rationale/Recommendations:  To increase indep with ADLs and trsfs.

## 2024-08-24 NOTE — PROGRESS NOTES
Physical Therapy Discharge Summary    Reason for therapy discharge:    Discharged to home.    Progress towards therapy goal(s). See goals on Care Plan in [facility] electronic health record for goal details.  Goals not met.  Barriers to achieving goals:   discharge from facility.    Therapy recommendation(s):    Continued therapy is recommended.  Rationale/Recommendations:  to increase independence with functional mobility.

## 2024-08-24 NOTE — PLAN OF CARE
Problem: UTI (Urinary Tract Infection)  Goal: Improved Infection Symptoms  Outcome: Progressing     Problem: Sepsis/Septic Shock  Goal: Absence of Infection Signs and Symptoms  Outcome: Progressing  Intervention: Initiate Sepsis Management  Recent Flowsheet Documentation  Taken 8/23/2024 1600 by Christianne Perez, RN  Infection Prevention: hand hygiene promoted  Taken 8/23/2024 0800 by Christianne Perez RN  Infection Prevention: hand hygiene promoted   Goal Outcome Evaluation:    Patient is alert and oriented. Pt ambulating with 1PA, slightly unsteady on feet. Pt became more restless and impulsive around 1730. Frequently setting off bed and chair alarms. Pt increasingly confused, but cooperative and redirectable. Pt complains of pain in left leg, unresolved with tylenol. Described as spasms/cramping. Remote MD paged and PRN medication ordered with relief. Pt was calmer and less impulsive after as well.    Christianne Perez RN

## 2024-08-24 NOTE — PLAN OF CARE
Goal Outcome Evaluation:      Plan of Care Reviewed With: patient, spouse          Outcome Evaluation: Discharge goals met: ambulating with minimal assist. Good appetite. Voiding well: Has good family support.    AVS Given and reviewed with spouse and patient. Discussed new meds, side effects, when to call the MD.   Importance of taking medications as prescribed. Signs and symptoms of bleeding.   Staying safe at home.  Questions asked and answered.

## 2024-08-24 NOTE — DISCHARGE SUMMARY
[facility]  Hospitalist Discharge Summary      Date of Admission:  8/22/2024  Date of Discharge:  8/24/2024  Discharging Provider: Jerrod Rodríguez MD  Discharge Service: Hospitalist Service    Discharge Diagnoses   Septic shock due to complicated UTI  E. Coli bacteremia  Nephrolithiasis  Hematuria    Clinically Significant Risk Factors          Follow-ups Needed After Discharge   Follow-up Appointments     Follow-up and recommended labs and tests       Follow up with primary care provider, TANIKA YU, within 7 days   for hospital follow- up.  The following labs/tests are recommended:  - check BMP by 9/2/24 to assess renal function (patient developed ОЛЕГ in   the setting of septic shock)  - followup for recurrence of hematuria in the setting of nephrolithiasis  - ensure Urology followup for nephrolithiasis given associated with   UTI/septic shock during hospital stay            Unresulted Labs Ordered in the Past 30 Days of this Admission       Date and Time Order Name Status Description    8/22/2024 12:54 PM Blood Culture Peripheral Blood Preliminary         These results will be followed up by PCP    Discharge Disposition   Discharged to home  Condition at discharge: Stable    Hospital Course   The patient was admitted with septic shock and e. Coli bacteremia due to a complicated UTI.  She had associated nephrolithiasis without hydronephrosis.  She had clinical improvement with antibiotics and was briefly on pressors.  Urology will followup as an outpatient regarding the nephrolithiasis. She was transitioned to oral antibiotics and discharged home.    Consultations This Hospital Stay   UROLOGY IP CONSULT  OCCUPATIONAL THERAPY ADULT IP CONSULT  PHYSICAL THERAPY ADULT IP CONSULT  CARE MANAGEMENT / SOCIAL WORK IP CONSULT  VASCULAR ACCESS ADULT IP CONSULT  INTENSIVIST IP CONSULT  UROLOGY IP CONSULT    Code Status   Full Code    Time Spent on this Encounter   I, Jerrod Rodríguez MD,  personally saw the patient today and spent less than or equal to 30 minutes discharging this patient.       Jerrod Rodríguez MD  [facility] ICU  [address] [address]  Phone: [phone]  Fax: [phone]  ______________________________________________________________________    Physical Exam   Vital Signs: Temp: 97.7  F (36.5  C) Temp src: Oral BP: 117/63 Pulse: 67   Resp: 17 SpO2: 96 % O2 Device: None (Room air)    Weight: 152 lbs 4.8 oz    See progress note       Primary Care Physician   TANIKA YU    Discharge Orders      Basic metabolic panel  (Ca, Cl, CO2, Creat, Gluc, K, Na, BUN)     Adult Urology  Referral      Reason for your hospital stay    You were admitted to the hospital with complications related to a urinary tract infection.  You were treated with antibiotics and had improvement by discharge.  You had two kidney stones which may have contributed to your infection.  You will followup with Urology after you leave the hospital for further management of the kidney stones.     Follow-up and recommended labs and tests     Follow up with primary care provider, TANIKA YU, within 7 days for hospital follow- up.  The following labs/tests are recommended:  - check BMP by 9/2/24 to assess renal function (patient developed ОЛЕГ in the setting of septic shock)  - followup for recurrence of hematuria in the setting of nephrolithiasis  - ensure Urology followup for nephrolithiasis given associated with UTI/septic shock during hospital stay     Activity    Your activity upon discharge: activity as tolerated     When to contact your care team    Seek medical attention if you have any of the following: recurrence of blood in urine, clots in urine, lack of ability to urinate for 12 hours or more, persistent fevers, abdominal pain, new back or flank pain, difficulty breathing, chest pain, or chest pressure.     Diet    Follow this diet upon  discharge: Current Diet:Orders Placed This Encounter      Regular Diet Adult       Significant Results and Procedures   Most Recent 3 CBC's:  Recent Labs   Lab Test 08/23/24  0632 08/23/24  0126 08/22/24  1237   WBC 13.1* 14.0* 16.1*   HGB 10.7* 11.1* 13.9   MCV 91 90 91   * 140* 181     Most Recent 3 BMP's:  Recent Labs   Lab Test 08/24/24  1234 08/23/24  0632 08/23/24  0126    142 140   POTASSIUM 3.6 3.2* 3.1*   CHLORIDE 110* 111* 110*   CO2 18* 18* 17*   BUN 31.3* 36.8* 39.3*   CR 1.47* 1.95* 2.11*   ANIONGAP 11 13 13   DAVID 8.2* 7.2* 7.3*   * 104* 129*   ,   Results for orders placed or performed during the hospital encounter of 08/22/24   Chest XR,  PA & LAT    Narrative    EXAM: XR CHEST 2 VIEWS  LOCATION: [facility]  DATE: 8/22/2024    INDICATION: Septic. Pancreatic lesion.  COMPARISON: CT CAP 7/3/2024,, chest x-ray 6/8/2022        Impression    IMPRESSION: Lungs are clear. Heart and pulmonary vascularity are normal. No signs of acute disease.   CT Abdomen Pelvis w/o Contrast    Narrative    EXAM: CT ABDOMEN PELVIS W/O CONTRAST  LOCATION: [facility]  DATE: 8/22/2024    INDICATION: Sepsis. Poor kidney function.   COMPARISON: MR pancreas 7/29/2024.   TECHNIQUE: CT scan of the abdomen and pelvis was performed without IV contrast. Multiplanar reformats were obtained. Dose reduction techniques were used.  CONTRAST: None.    FINDINGS:     LOWER CHEST: Subsegmental atelectasis.     HEPATOBILIARY: Cholelithiasis. No biliary ductal dilation. No liver lesions.     PANCREAS: Known cystic lesion within the pancreatic body/tail is not well visualized due to the noncontrast technique. No peripancreatic inflammation or fluid collections.     SPLEEN: Normal.    ADRENAL GLANDS: Normal.    KIDNEYS/BLADDER: Bilateral perinephric edema. Nonobstructing 1.0 x 0.7 x 1.5 cm calculus at the lower pole of the left kidney. Nonobstructing 0.2 cm calculus at the lower  pole of the right kidney. No ureteral calculi or hydronephrosis. Normal bladder. No   intraluminal bladder calculi.     BOWEL: No bowel obstruction or inflammation. Sigmoid colonic diverticulosis. No inflamed diverticuli.     LYMPH NODES: No enlarged lymph nodes.     VASCULATURE: Moderate aortobiiliac sclerosis. No abdominal aortic aneurysm.     PELVIC ORGANS: Normal.    MUSCULOSKELETAL: Thoracolumbar dextroscoliosis with multilevel degenerative changes of the spine. Degenerative changes of the right hip. No acute bony abnormality or destructive bone lesions. Tiny fat-containing periumbilical hernia.       Impression    IMPRESSION:     1.  No definite acute abnormality, within the limitations of the noncontrast technique.    2.  Nonobstructing 1.5 cm left renal calculus. There is also a nonobstructing 0.2 cm right renal calculus. No hydronephrosis.     3.  Cholelithiasis.    4.  Sigmoid colonic diverticulosis. No inflamed diverticuli.    5.  A known cystic pancreatic lesion is not well visualized due to the noncontrast technique.   XR Chest Port 1 View    Narrative    EXAM: XR CHEST PORT 1 VIEW  LOCATION: [facility]  DATE: 8/22/2024    INDICATION: PICC line placement.  COMPARISON: Chest x-ray on 8/22/2024.      Impression    IMPRESSION: Single AP view of the chest was obtained. Left upper extremity PICC tip projects over mid right atrium. Cardiomediastinal silhouette is within normal limits. Mild bibasilar pulmonary opacities, likely atelectasis. No significant pleural   effusion or pneumothorax. Degenerative changes of bilateral shoulder joints.   XR Chest Port 1 View    Narrative    EXAM: XR CHEST PORT 1 VIEW  LOCATION: [facility]  DATE: 8/22/2024    INDICATION: picc line placement  COMPARISON: Earlier today.      Impression    IMPRESSION: Left PICC tip in the lower SVC. Mild cardiomegaly. Normal pulmonary vascularity. Mild left basilar atelectasis. Lungs  otherwise clear. No pneumothorax.       Discharge Medications   Current Discharge Medication List        START taking these medications    Details   ciprofloxacin (CIPRO) 500 MG tablet Take 1 tablet (500 mg) by mouth 2 times daily for 12 days.  Qty: 24 tablet, Refills: 0    Associated Diagnoses: Septic shock (H)           CONTINUE these medications which have NOT CHANGED    Details   acetaminophen (TYLENOL) 500 MG tablet Take 500-1,000 mg by mouth every 6 hours as needed for mild pain      apixaban ANTICOAGULANT (ELIQUIS) 5 MG tablet Take 1 tablet (5 mg) by mouth 2 times daily  Qty: 180 tablet, Refills: 3    Associated Diagnoses: Chronic deep vein thrombosis (DVT) of femoral vein of left lower extremity (H)      cholecalciferol, vitamin D3, (VITAMIN D3) 2,000 unit Tab Take 1,000 Units by mouth daily      GLUCOSAM HCL/CHONDRO MONTES A/C/MN (GLUCOSAMINE-CHONDROITIN COMPLX ORAL) [GLUCOSAM HCL/CHONDRO MONTES A/C/MN (GLUCOSAMINE-CHONDROITIN COMPLX ORAL)] Take 1 tablet by mouth daily. Tablet      multivitamin capsule [MULTIVITAMIN CAPSULE] Take 1 capsule by mouth daily.      simvastatin (ZOCOR) 10 MG tablet TAKE 1 TABLET (10 MG) BY MOUTH DAILY.  Qty: 90 tablet, Refills: 1    Associated Diagnoses: Hyperlipidemia, unspecified hyperlipidemia type           STOP taking these medications       hydroCHLOROthiazide 12.5 MG tablet Comments:   Reason for Stopping:             Allergies   Allergies   Allergen Reactions    Sulfamethoxazole-Trimethoprim Dizziness    Tetracycline Nausea

## 2024-08-24 NOTE — PLAN OF CARE
Problem: Risk for Delirium  Goal: Improved Behavioral Control  Outcome: Progressing  Intervention: Minimize Safety Risk  Recent Flowsheet Documentation  Taken 8/24/2024 0050 by Berkley Ayala RN  Enhanced Safety Measures: room near unit station  Taken 8/23/2024 2045 by Berkley Ayala RN  Enhanced Safety Measures: room near unit station     Problem: Risk for Delirium  Goal: Improved Behavioral Control  Intervention: Minimize Safety Risk  Recent Flowsheet Documentation  Taken 8/24/2024 0050 by Berkley Ayala, RN  Enhanced Safety Measures: room near unit station  Taken 8/23/2024 2045 by Berkley Ayala RN  Enhanced Safety Measures: room near unit station   Pt disoriented to time and situation, occasionally location. Pt's orientation waxed and waned throughout the night. Declined vital signs overnight, even was a bit agitated with staff trying to perform cares. Became a bit more oriented as the night went on. Voiding spontaneously, urine strained. Endorsing mild pain in legs, was agreeable to tylenol overnight. PICC line intact.

## 2024-08-24 NOTE — CONSULTS
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  She was admitted to the hospital due to sepsis from urinary tract infection.  CT scan does not show any obstructing stone.  We are consulted due to the bilateral renal stones and gross hematuria.    Spoke to the patient and  at length.  No flank pain.  Hematuria improving.  She denies any prior history of stone disease.  No difficulty voiding.  She does report a history of recurrent urinary tract infections.    Labs:  Blood culture with E. coli  Most Recent 3 CBC's:  Recent Labs   Lab Test 08/23/24  0632 08/23/24  0126 08/22/24  1237   WBC 13.1* 14.0* 16.1*   HGB 10.7* 11.1* 13.9   MCV 91 90 91   * 140* 181     Most Recent 3 BMP's:  Recent Labs   Lab Test 08/24/24  1234 08/23/24  0632 08/23/24  0126    142 140   POTASSIUM 3.6 3.2* 3.1*   CHLORIDE 110* 111* 110*   CO2 18* 18* 17*   BUN 31.3* 36.8* 39.3*   CR 1.47* 1.95* 2.11*   ANIONGAP 11 13 13   DAVID 8.2* 7.2* 7.3*   * 104* 129*     Most Recent Urinalysis:  Recent Labs   Lab Test 08/22/24  1502 10/13/23  1121   COLOR Light Orange* Yellow   APPEARANCE Cloudy* Cloudy*   URINEGLC Negative Negative   URINEBILI Negative Negative   URINEKETONE Negative Negative   SG 1.010 1.020   UBLD >1.0 mg/dL* Moderate*   URINEPH 6.0 7.0   PROTEIN 300* 100*   UROBILINOGEN  --  0.2   NITRITE Positive* Negative   LEUKEST 500 Ramiro/uL* Large*   RBCU >182* 2-5*   WBCU >182* >100*       Imaging  I reviewed the CT  KIDNEYS/BLADDER: Bilateral perinephric edema. Nonobstructing 1.0 x 0.7 x 1.5 cm calculus at the lower pole of the left kidney. Nonobstructing 0.2 cm calculus at the lower pole of the right kidney. No ureteral calculi or hydronephrosis. Normal bladder. No    intraluminal bladder calculi.       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  Asymptomatic.  I suspect she has had this for many years but I have no prior imaging to compare to.  It is unclear whether the stone is the source of her infection.  She has had a variety of different bacteria and looking at urine cultures going back that suggest that maybe stone is not the source.  Will be technically challenging to clear the stone especially on anticoagulation given the large size and the lower calyx location and given that it is unclear whether will help with the infection I would probably continue to observe this while she continues to workup the pancreatic mass.  They are both in agreement of this    The stone is visible on  film of the CT so we can monitor with x-rays.  Will plan to see in around 3 months with another x-ray    #2 urinary tract infection  Reports a history of recurrent urinary tract infection.  This time she had E. coli in the bloodstream but the bladder shows mixed urogenital lloyd.  She has had a staph in the urine before possibly kidney is source but I would recommend we start with conservative measures.  I recommend that they try to get around 80 ounces of fluid per day that should help reduce the risk of urinary tract infection and we will also start vaginal estrogen cream.  I have prescribed the Premarin cream to the pharmacy she should apply daily for 2 weeks and then twice a week.  A fingertip amount to the vaginal introitus near the urethra we went over the risks and benefits of this and they were agreeable with trying it out    Will arrange for a follow-up in our clinic in about 3 months with an x-ray.  Certainly she should reach out to us  sooner if she is having more urine infections.  And then we can revisit whether the stone should be treated    Sridhar Paniagua MD  Urology

## 2024-08-25 LAB
BACTERIA BLD CULT: ABNORMAL
BACTERIA BLD CULT: ABNORMAL

## 2024-08-26 ENCOUNTER — TELEPHONE (OUTPATIENT)
Dept: FAMILY MEDICINE | Facility: CLINIC | Age: 84
End: 2024-08-26
Payer: COMMERCIAL

## 2024-08-26 DIAGNOSIS — N20.0 NEPHROLITHIASIS: Primary | ICD-10-CM

## 2024-08-26 NOTE — TELEPHONE ENCOUNTER
"Per last OV notes from 8/22/24 visit with Dr. López:    \"Recommendation   The patient presented today appearing weaker than her usual with low blood pressure as well as sleeping more than is her usual. Her white blood cell count was elevated although there was no definite focal sign on exam or by history of a source of infection. Her blood pressure on recheck was even lower and I was concerned about the possibility of sepsis and felt she needed further evaluation more urgently. I referred the patient to the emergency room and spoke with one of the ED doctors to transfer her. In addition, I did speak with the gastroenterologist and advised that I did not feel that proceeding with the procedure on the 30th is advisable and he is going to schedule a consultative appointment with the patient and her  in the near future.\"       Called patient back, spoke with patient and , Adebayo as well as their daughter, Asia, on speaker phone.      Patient states \"I'm really feeling pretty good,\" since hospital discharge 8/24/24.  Confirms taking Cipro BID since discharge and will complete full 12 day course as prescribed.  Patient also reports having picked up Premarin vaginal cream today, prescribed by Urologist 8/24/24 for recurrent UTI and will start today (please see telephone encounter for details).  Overall feeling much better.  Does report some \"cramping\" along the top part of her thighs the last week to week and a half, primarily occurring at night but sometimes in the mornings, that is relieved with Tylenol.   states \"I think the cramping is due to inactivity in the last couple of weeks and not exercising enough.\"  Patient has also been taking her Eliquis BID as prescribed.  Will continue to monitor symptoms and will call back if worse/new symptoms develop.     Regarding endoscopy procedure scheduled on 8/30/24, RN reviewed recommendation(s) from Dr. López regarding not proceeding with procedure on " the 30th and having already spoke with [facility] gastroenterologist about this and they were going to schedule consultative appointment in the near future with patient and her .      RN scheduled patient for hospital follow up appointment with Dr. López on 9/5/24 and will discuss procedure/[facility] follow up at that time.  No further questions/concerns at this time, and patient stated feeling comfortable with waiting to talk with Dr. López until her appointment next week.          Asia Weber RN  [facility]

## 2024-08-26 NOTE — PROGRESS NOTES
Orders for XR KUB in 3 months per Dr. Paniagua's notes.    WONG Montoya  Care Coordinator- Urology   [phone]

## 2024-08-26 NOTE — TELEPHONE ENCOUNTER
General Call    Contacts       Contact Date/Time Type Contact Phone/Fax    08/26/2024 12:28 PM CDT Phone (Incoming) Adebayo Manzo (Emergency Contact) [phone] (M)          Reason for Call:  Endoscopy procedure    What are your questions or concerns:  Incoming call from patient's  stating that she was discharged from the hospital yesterday. He wants to know if they should proceed with the Endoscopy procedure on 8/30 with [facility] and schedule prep-op appointment. Provided them with [facility]'s phone number to find out if it needs to be postponed. Also, they're requesting to speak with Dr. López.     Date of last appointment with provider: 8/22/24    Could we send this information to you in [facility] or would you prefer to receive a phone call?:   Patient would prefer a phone call   Okay to leave a detailed message?: Yes at Home number on file [phone] (home)

## 2024-08-27 ENCOUNTER — PATIENT OUTREACH (OUTPATIENT)
Dept: CARE COORDINATION | Facility: CLINIC | Age: 84
End: 2024-08-27
Payer: COMMERCIAL

## 2024-08-27 NOTE — PROGRESS NOTES
[facility]    Background: Transitional Care Management program identified per system criteria and reviewed by [facility] team for possible outreach.    Assessment:  Upon chart review, patient is in communication with a clinic team member, nurse or provider within [facility] for reason of discussing hospital follow up plan of care and answering questions patient may have related to discharge instructions. [facility] Team member will update episode status of Transitional Care Management program to enrolled per system workflows.     [facility] made first outreach attempt on 8/26 to reach patient for hospital follow up and left message and that time.     Patient has active communication with a nurse, provider or care team for reason of post-hospital follow up plan.  Outreach call by [facility] team not indicated to minimize duplicative efforts.     Patient was contacted yesterday by their Primary Care Provider from [facility]. Active communication was appropriate for ED/Hospital discharge and follow-up. No [facility] outreach needed at this time. Chart review second outreach.    Plan: [facility] will make no additional outreach attempts to minimize duplicative efforts and reduce potential confusion for patient.      JATIN West  [phone]  [facility]     *[facility] Resource Team does NOT follow patient ongoing. Referrals are identified based on internal discharge reports and the outreach is to ensure patient has an understanding of their discharge instructions.

## 2024-08-29 ENCOUNTER — LAB (OUTPATIENT)
Dept: LAB | Facility: CLINIC | Age: 84
End: 2024-08-29
Payer: COMMERCIAL

## 2024-08-29 DIAGNOSIS — N17.9 AKI (ACUTE KIDNEY INJURY) (H): ICD-10-CM

## 2024-08-29 DIAGNOSIS — R53.83 FATIGUE: Primary | ICD-10-CM

## 2024-08-29 LAB
ALBUMIN UR-MCNC: NEGATIVE MG/DL
ANION GAP SERPL CALCULATED.3IONS-SCNC: 15 MMOL/L (ref 7–15)
APPEARANCE UR: CLEAR
BILIRUB UR QL STRIP: NEGATIVE
BUN SERPL-MCNC: 16.2 MG/DL (ref 8–23)
CALCIUM SERPL-MCNC: 8.1 MG/DL (ref 8.8–10.4)
CHLORIDE SERPL-SCNC: 109 MMOL/L (ref 98–107)
COLOR UR AUTO: ABNORMAL
CREAT SERPL-MCNC: 1.15 MG/DL (ref 0.51–0.95)
EGFRCR SERPLBLD CKD-EPI 2021: 47 ML/MIN/1.73M2
GLUCOSE SERPL-MCNC: 145 MG/DL (ref 70–99)
GLUCOSE UR STRIP-MCNC: NEGATIVE MG/DL
HCO3 SERPL-SCNC: 21 MMOL/L (ref 22–29)
HGB UR QL STRIP: ABNORMAL
KETONES UR STRIP-MCNC: NEGATIVE MG/DL
LEUKOCYTE ESTERASE UR QL STRIP: NEGATIVE
NITRATE UR QL: NEGATIVE
PH UR STRIP: 6.5 [PH] (ref 5–7)
POTASSIUM SERPL-SCNC: 3.4 MMOL/L (ref 3.4–5.3)
RBC URINE: 72 /HPF
SODIUM SERPL-SCNC: 145 MMOL/L (ref 135–145)
SP GR UR STRIP: 1.01 (ref 1–1.03)
SQUAMOUS EPITHELIAL: 5 /HPF
UROBILINOGEN UR STRIP-MCNC: <2 MG/DL
WBC URINE: 5 /HPF

## 2024-08-29 PROCEDURE — 81001 URINALYSIS AUTO W/SCOPE: CPT

## 2024-08-29 PROCEDURE — 36415 COLL VENOUS BLD VENIPUNCTURE: CPT

## 2024-08-29 PROCEDURE — 80048 BASIC METABOLIC PNL TOTAL CA: CPT

## 2024-09-05 ENCOUNTER — OFFICE VISIT (OUTPATIENT)
Dept: FAMILY MEDICINE | Facility: CLINIC | Age: 84
End: 2024-09-05
Payer: COMMERCIAL

## 2024-09-05 VITALS
HEIGHT: 66 IN | DIASTOLIC BLOOD PRESSURE: 75 MMHG | BODY MASS INDEX: 23.46 KG/M2 | SYSTOLIC BLOOD PRESSURE: 132 MMHG | TEMPERATURE: 97.6 F | OXYGEN SATURATION: 100 % | HEART RATE: 67 BPM | RESPIRATION RATE: 16 BRPM | WEIGHT: 146 LBS

## 2024-09-05 DIAGNOSIS — I82.512 CHRONIC DEEP VEIN THROMBOSIS (DVT) OF FEMORAL VEIN OF LEFT LOWER EXTREMITY (H): ICD-10-CM

## 2024-09-05 DIAGNOSIS — I10 BENIGN ESSENTIAL HYPERTENSION: ICD-10-CM

## 2024-09-05 DIAGNOSIS — D64.9 ANEMIA, UNSPECIFIED TYPE: ICD-10-CM

## 2024-09-05 DIAGNOSIS — N17.9 ACUTE RENAL FAILURE, UNSPECIFIED ACUTE RENAL FAILURE TYPE (H): Primary | ICD-10-CM

## 2024-09-05 DIAGNOSIS — K86.89 PANCREATIC MASS: ICD-10-CM

## 2024-09-05 DIAGNOSIS — N18.31 CHRONIC KIDNEY DISEASE, STAGE 3A (H): ICD-10-CM

## 2024-09-05 DIAGNOSIS — R31.0 GROSS HEMATURIA: ICD-10-CM

## 2024-09-05 LAB
ERYTHROCYTE [DISTWIDTH] IN BLOOD BY AUTOMATED COUNT: 13.5 % (ref 10–15)
HCT VFR BLD AUTO: 35.9 % (ref 35–47)
HGB BLD-MCNC: 11.3 G/DL (ref 11.7–15.7)
MCH RBC QN AUTO: 30.1 PG (ref 26.5–33)
MCHC RBC AUTO-ENTMCNC: 31.5 G/DL (ref 31.5–36.5)
MCV RBC AUTO: 96 FL (ref 78–100)
PLATELET # BLD AUTO: 324 10E3/UL (ref 150–450)
RBC # BLD AUTO: 3.75 10E6/UL (ref 3.8–5.2)
WBC # BLD AUTO: 9.9 10E3/UL (ref 4–11)

## 2024-09-05 PROCEDURE — 99495 TRANSJ CARE MGMT MOD F2F 14D: CPT | Performed by: FAMILY MEDICINE

## 2024-09-05 PROCEDURE — 36415 COLL VENOUS BLD VENIPUNCTURE: CPT | Performed by: FAMILY MEDICINE

## 2024-09-05 PROCEDURE — 80069 RENAL FUNCTION PANEL: CPT | Performed by: FAMILY MEDICINE

## 2024-09-05 PROCEDURE — 85027 COMPLETE CBC AUTOMATED: CPT | Performed by: FAMILY MEDICINE

## 2024-09-05 PROCEDURE — 87086 URINE CULTURE/COLONY COUNT: CPT | Performed by: FAMILY MEDICINE

## 2024-09-05 NOTE — PROGRESS NOTES
Assessment & Plan   Problem List Items Addressed This Visit       Chronic kidney disease, stage 3a (H)    Relevant Orders    Urine Culture    Benign essential hypertension     The patient's blood pressure appears to be under adequate control after discontinuation of her hydrochlorothiazide.  I recommended continuing off medication.         Chronic deep vein thrombosis (DVT) of femoral vein of left lower extremity (H)     The patient continues on Eliquis 5 mg twice daily.         Pancreatic mass     I encouraged the patient to call [facility] scheduling line to reschedule her EUS procedure.         Hematuria, unspecified type     The patient's urine today showed gross hematuria.  She states that this is the first time since discharge from the hospital she has seen that.  She is not currently having any dysuria, increased frequency or urgency.  In addition, she is not having any back pain.  I recommended rechecking a urinalysis in the morning as she was not able to provide enough volume for a full urinalysis.  I will assess that and then likely reach out via an E consult to urology for more rapid evaluation than November.         Relevant Orders    Urine Culture    UA Macroscopic with reflex to Microscopic and Culture - Clinic Collect (Completed)     Other Visit Diagnoses       Acute renal failure, unspecified acute renal failure type (H24)    -  Primary    Rechecked renal panel today; patient is staying well hydrated    Relevant Orders    Renal panel (Alb, BUN, Ca, Cl, CO2, Creat, Gluc, Phos, K, Na) (Completed)    Urine Culture    Anemia, unspecified type        Rechecked CBC today    Relevant Orders    CBC with platelets (Completed)                MED REC REQUIRED  Post Medication Reconciliation Status: discharge medications reconciled, continue medications without change        Rowena Acuan is a 84 year old accompanied by her  today for a follow-up from a recent hospitalization.  The  "patient was hospitalized with severe urosepsis and was in the intensive care unit.  She was found to have acute renal failure and is in need of a follow-up renal panel to reassess that.  The patient was also found to have 2 kidney stones 1 of which was 1.5 cm in size.  She has a follow-up appointment with urology in November.  The patient states that she does feel much better overall.  Her blood pressure medication was discontinued and she was advised not to resume taking it.  She was supposed to have an endoscopic ultrasound and biopsy of the pancreatic mass which was found but because of her illness that procedure got canceled and she is wondering how to get that rescheduled.  Hospital F/U        Hospital Follow-up Visit:    Hospital/Nursing Home/IP Rehab Facility:   Date of Admission: 08/22/24  Date of Discharge: 08/24  Reason(s) for Admission: UTI/ blood pressure  Was the patient in the ICU or did the patient experience delirium during hospitalization?  No  Do you have any other stressors you would like to discuss with your provider? No    Problems taking medications regularly:  None  Medication changes since discharge: None  Problems adhering to non-medication therapy:  None    Summary of hospitalization:  [facility] discharge summary reviewed  Diagnostic Tests/Treatments reviewed.  Follow up needed: Needs to reschedule EUS  Other Healthcare Providers Involved in Patient s Care:         None  Update since discharge: improved.     Plan of care communicated with patient and family         Objective    /75 (BP Location: Left arm, Patient Position: Left side, Cuff Size: Adult Regular)   Pulse 67   Temp 97.6  F (36.4  C) (Oral)   Resp 16   Ht 1.676 m (5' 6\")   Wt 66.2 kg (146 lb)   SpO2 100%   BMI 23.57 kg/m    Body mass index is 23.57 kg/m .  Physical Exam   GENERAL: alert and no distress            Signed Electronically by: TANIKA YU MD    "

## 2024-09-06 ENCOUNTER — LAB (OUTPATIENT)
Dept: LAB | Facility: CLINIC | Age: 84
End: 2024-09-06
Payer: COMMERCIAL

## 2024-09-06 ENCOUNTER — TELEPHONE (OUTPATIENT)
Dept: FAMILY MEDICINE | Facility: CLINIC | Age: 84
End: 2024-09-06

## 2024-09-06 DIAGNOSIS — R31.9 HEMATURIA, UNSPECIFIED TYPE: ICD-10-CM

## 2024-09-06 LAB
ALBUMIN SERPL BCG-MCNC: 3.8 G/DL (ref 3.5–5.2)
ALBUMIN UR-MCNC: NEGATIVE MG/DL
ANION GAP SERPL CALCULATED.3IONS-SCNC: 13 MMOL/L (ref 7–15)
APPEARANCE UR: ABNORMAL
BACTERIA #/AREA URNS HPF: ABNORMAL /HPF
BILIRUB UR QL STRIP: NEGATIVE
BUN SERPL-MCNC: 16.2 MG/DL (ref 8–23)
CALCIUM SERPL-MCNC: 8.8 MG/DL (ref 8.8–10.4)
CHLORIDE SERPL-SCNC: 107 MMOL/L (ref 98–107)
COLOR UR AUTO: YELLOW
CREAT SERPL-MCNC: 1.13 MG/DL (ref 0.51–0.95)
EGFRCR SERPLBLD CKD-EPI 2021: 48 ML/MIN/1.73M2
GLUCOSE SERPL-MCNC: 92 MG/DL (ref 70–99)
GLUCOSE UR STRIP-MCNC: NEGATIVE MG/DL
HCO3 SERPL-SCNC: 23 MMOL/L (ref 22–29)
HGB UR QL STRIP: ABNORMAL
KETONES UR STRIP-MCNC: NEGATIVE MG/DL
LEUKOCYTE ESTERASE UR QL STRIP: ABNORMAL
NITRATE UR QL: NEGATIVE
PH UR STRIP: 7 [PH] (ref 5–8)
PHOSPHATE SERPL-MCNC: 2.9 MG/DL (ref 2.5–4.5)
POTASSIUM SERPL-SCNC: 3.5 MMOL/L (ref 3.4–5.3)
RBC #/AREA URNS AUTO: ABNORMAL /HPF
SODIUM SERPL-SCNC: 143 MMOL/L (ref 135–145)
SP GR UR STRIP: 1.01 (ref 1–1.03)
SQUAMOUS #/AREA URNS AUTO: ABNORMAL /LPF
UROBILINOGEN UR STRIP-ACNC: 0.2 E.U./DL
WBC #/AREA URNS AUTO: ABNORMAL /HPF

## 2024-09-06 PROCEDURE — 81001 URINALYSIS AUTO W/SCOPE: CPT

## 2024-09-06 NOTE — TELEPHONE ENCOUNTER
"Patient calling to clarify imaging procedures/orders. She states she understands EUS needs to be rescheduled and she did on 11/8 - states [facility] was contacted, and procedure is scheduled over [facility]    LOV 9/5/24  \"Pancreatic mass  I encouraged the patient to call [facility] scheduling line to reschedule her EUS procedure\"    Patient wants to clarify what the XR KUB is for. Informed the patient of the note from Urologist last ED visit:    ED note from Urologist Dr. Paniagua 8/26:  #1 nephrolithiasis  Nonobstructing left lower pole renal stone is quite large in size about 1.6 cm.  Asymptomatic.  I suspect she has had this for many years but I have no prior imaging to compare to.  It is unclear whether the stone is the source of her infection.  She has had a variety of different bacteria and looking at urine cultures going back that suggest that maybe stone is not the source.  Will be technically challenging to clear the stone especially on anticoagulation given the large size and the lower calyx location and given that it is unclear whether will help with the infection I would probably continue to observe this while she continues to workup the pancreatic mass.  They are both in agreement of this     The stone is visible on  film of the CT so we can monitor with x-rays.  Will plan to see in around 3 months with another x-ray:        Patient and  verbalized understanding and will call to schedule XR KUB within the recommended time frame. No further concerns / questions at this time.  "

## 2024-09-06 NOTE — ASSESSMENT & PLAN NOTE
The patient's urine today showed gross hematuria.  She states that this is the first time since discharge from the hospital she has seen that.  She is not currently having any dysuria, increased frequency or urgency.  In addition, she is not having any back pain.  I recommended rechecking a urinalysis in the morning as she was not able to provide enough volume for a full urinalysis.  I will assess that and then likely reach out via an E consult to urology for more rapid evaluation than November.

## 2024-09-06 NOTE — ASSESSMENT & PLAN NOTE
I encouraged the patient to call [facility] scheduling line to reschedule her EUS procedure.

## 2024-09-06 NOTE — ASSESSMENT & PLAN NOTE
The patient's blood pressure appears to be under adequate control after discontinuation of her hydrochlorothiazide.  I recommended continuing off medication.

## 2024-09-07 LAB — BACTERIA UR CULT: NORMAL

## 2024-09-09 ENCOUNTER — E-CONSULT (OUTPATIENT)
Dept: UROLOGY | Facility: CLINIC | Age: 84
End: 2024-09-09
Payer: COMMERCIAL

## 2024-09-09 ENCOUNTER — TELEPHONE (OUTPATIENT)
Dept: FAMILY MEDICINE | Facility: CLINIC | Age: 84
End: 2024-09-09

## 2024-09-09 DIAGNOSIS — N20.0 NEPHROLITHIASIS: ICD-10-CM

## 2024-09-09 DIAGNOSIS — R31.0 GROSS HEMATURIA: Primary | ICD-10-CM

## 2024-09-09 PROCEDURE — 99207 PR NO BILLABLE SERVICE THIS VISIT: CPT | Performed by: STUDENT IN AN ORGANIZED HEALTH CARE EDUCATION/TRAINING PROGRAM

## 2024-09-09 NOTE — PROGRESS NOTES
9/9/2024     E-Consult has been denied due to: Doesn't meet criteria for E-Consult - Patient previously established care with specialty, or scheduled within the next 3 months.    Interprofessional consultation requested by:  Mela López MD      Clinical Question/Purpose: MY CLINICAL QUESTION IS: This patient was hospitalized with severe urosepsis and was found to have two kidney stones (1.5cm and 2mm) and referred to Urology. She was seen for a hospital follow up and her urine showed significant gross hematuria. Urine culture <10,000 organisms. Question is whether November is soon enough for a follow up regarding this? Anything we should be watching for with this? Should she possibly be on prophylactic antibiotics?    Patient assessment and information reviewed: consult note from Dr. Paniagua 8/24/2024    Recommendations: non urgent followup is appropriate. Will be technically challenging to remove this stone and she has already been seen by stone specialist and is scheduled with stone specialist in November. Will see if can be moved up since she continues to have gross hematuria      The recommendations provided in this E-Consult are based on a review of clinical data pertinent to the clinical question presented, without a review of the patient's complete medical record or, the benefit of a comprehensive in-person or virtual patient evaluation. This consultation should not replace the clinical judgement and evaluation of the provider ordering this E-Consult. Any new clinical issues, or changes in patient status since the filing of this E-Consult will need to be taken into account when assessing these recommendations. Please contact me if you have further questions.    My total time spent reviewing clinical information and formulating assessment was 5 minutes.        Anselmo Triplett MD

## 2024-09-09 NOTE — TELEPHONE ENCOUNTER
Patient called reporting she received a message from Dr. López today and returning the call.  Informed Dr. López is seeing patients now.  Routing note to Dr. López.      Asia Weber RN  [facility]

## 2024-09-09 NOTE — Clinical Note
Patient's PCP is wondering if this patient's followup for large nonobstructive nephrolithiasis and hematuria can be moved up. Seen by Ellis but scheduled with Dangelo in November  Anselmo Triplett MD  [facility] [phone] clinic phone

## 2024-09-13 ENCOUNTER — TELEPHONE (OUTPATIENT)
Dept: FAMILY MEDICINE | Facility: CLINIC | Age: 84
End: 2024-09-13

## 2024-09-13 ENCOUNTER — HOSPITAL ENCOUNTER (EMERGENCY)
Facility: CLINIC | Age: 84
Discharge: HOME | End: 2024-09-13
Attending: EMERGENCY MEDICINE | Admitting: EMERGENCY MEDICINE
Payer: COMMERCIAL

## 2024-09-13 ENCOUNTER — APPOINTMENT (OUTPATIENT)
Dept: CT IMAGING | Facility: CLINIC | Age: 84
End: 2024-09-13
Attending: EMERGENCY MEDICINE
Payer: COMMERCIAL

## 2024-09-13 VITALS
HEIGHT: 66 IN | SYSTOLIC BLOOD PRESSURE: 156 MMHG | RESPIRATION RATE: 18 BRPM | HEART RATE: 70 BPM | BODY MASS INDEX: 22.5 KG/M2 | WEIGHT: 140 LBS | OXYGEN SATURATION: 99 % | DIASTOLIC BLOOD PRESSURE: 75 MMHG | TEMPERATURE: 97.6 F

## 2024-09-13 DIAGNOSIS — E87.0 HYPERNATREMIA: ICD-10-CM

## 2024-09-13 DIAGNOSIS — R31.9 HEMATURIA, UNSPECIFIED TYPE: ICD-10-CM

## 2024-09-13 LAB
ALBUMIN UR-MCNC: 30 MG/DL
ANION GAP SERPL CALCULATED.3IONS-SCNC: 12 MMOL/L (ref 7–15)
APPEARANCE UR: ABNORMAL
APTT PPP: 32 SECONDS (ref 22–38)
BACTERIA #/AREA URNS HPF: ABNORMAL /HPF
BASOPHILS # BLD AUTO: 0 10E3/UL (ref 0–0.2)
BASOPHILS NFR BLD AUTO: 1 %
BILIRUB UR QL STRIP: NEGATIVE
BUN SERPL-MCNC: 17.8 MG/DL (ref 8–23)
CALCIUM SERPL-MCNC: 8.8 MG/DL (ref 8.8–10.4)
CHLORIDE SERPL-SCNC: 106 MMOL/L (ref 98–107)
COLOR UR AUTO: YELLOW
CREAT SERPL-MCNC: 1.18 MG/DL (ref 0.51–0.95)
EGFRCR SERPLBLD CKD-EPI 2021: 45 ML/MIN/1.73M2
EOSINOPHIL # BLD AUTO: 0.1 10E3/UL (ref 0–0.7)
EOSINOPHIL NFR BLD AUTO: 2 %
ERYTHROCYTE [DISTWIDTH] IN BLOOD BY AUTOMATED COUNT: 13.3 % (ref 10–15)
GLUCOSE SERPL-MCNC: 100 MG/DL (ref 70–99)
GLUCOSE UR STRIP-MCNC: NEGATIVE MG/DL
HCO3 SERPL-SCNC: 29 MMOL/L (ref 22–29)
HCT VFR BLD AUTO: 36.1 % (ref 35–47)
HGB BLD-MCNC: 11.6 G/DL (ref 11.7–15.7)
HGB UR QL STRIP: ABNORMAL
IMM GRANULOCYTES # BLD: 0 10E3/UL
IMM GRANULOCYTES NFR BLD: 0 %
INR PPP: 1.53 (ref 0.85–1.15)
KETONES UR STRIP-MCNC: NEGATIVE MG/DL
LEUKOCYTE ESTERASE UR QL STRIP: ABNORMAL
LYMPHOCYTES # BLD AUTO: 2.3 10E3/UL (ref 0.8–5.3)
LYMPHOCYTES NFR BLD AUTO: 40 %
MCH RBC QN AUTO: 30.3 PG (ref 26.5–33)
MCHC RBC AUTO-ENTMCNC: 32.1 G/DL (ref 31.5–36.5)
MCV RBC AUTO: 94 FL (ref 78–100)
MONOCYTES # BLD AUTO: 0.5 10E3/UL (ref 0–1.3)
MONOCYTES NFR BLD AUTO: 9 %
MUCOUS THREADS #/AREA URNS LPF: PRESENT /LPF
NEUTROPHILS # BLD AUTO: 2.7 10E3/UL (ref 1.6–8.3)
NEUTROPHILS NFR BLD AUTO: 48 %
NITRATE UR QL: NEGATIVE
NRBC # BLD AUTO: 0 10E3/UL
NRBC BLD AUTO-RTO: 0 /100
PH UR STRIP: 7 [PH] (ref 5–7)
PLATELET # BLD AUTO: 232 10E3/UL (ref 150–450)
POTASSIUM SERPL-SCNC: 4 MMOL/L (ref 3.4–5.3)
RBC # BLD AUTO: 3.83 10E6/UL (ref 3.8–5.2)
RBC URINE: >182 /HPF
SODIUM SERPL-SCNC: 147 MMOL/L (ref 135–145)
SP GR UR STRIP: 1.01 (ref 1–1.03)
SQUAMOUS EPITHELIAL: 8 /HPF
UROBILINOGEN UR STRIP-MCNC: <2 MG/DL
WBC # BLD AUTO: 5.7 10E3/UL (ref 4–11)
WBC URINE: 46 /HPF

## 2024-09-13 PROCEDURE — 80048 BASIC METABOLIC PNL TOTAL CA: CPT | Performed by: EMERGENCY MEDICINE

## 2024-09-13 PROCEDURE — 74176 CT ABD & PELVIS W/O CONTRAST: CPT

## 2024-09-13 PROCEDURE — 36415 COLL VENOUS BLD VENIPUNCTURE: CPT | Performed by: EMERGENCY MEDICINE

## 2024-09-13 PROCEDURE — 99284 EMERGENCY DEPT VISIT MOD MDM: CPT | Mod: 25

## 2024-09-13 PROCEDURE — 85610 PROTHROMBIN TIME: CPT | Performed by: EMERGENCY MEDICINE

## 2024-09-13 PROCEDURE — 85025 COMPLETE CBC W/AUTO DIFF WBC: CPT | Performed by: EMERGENCY MEDICINE

## 2024-09-13 PROCEDURE — 81001 URINALYSIS AUTO W/SCOPE: CPT | Performed by: EMERGENCY MEDICINE

## 2024-09-13 PROCEDURE — 87086 URINE CULTURE/COLONY COUNT: CPT | Performed by: EMERGENCY MEDICINE

## 2024-09-13 PROCEDURE — 85730 THROMBOPLASTIN TIME PARTIAL: CPT | Performed by: EMERGENCY MEDICINE

## 2024-09-13 ASSESSMENT — ACTIVITIES OF DAILY LIVING (ADL): ADLS_ACUITY_SCORE: 36

## 2024-09-13 ASSESSMENT — COLUMBIA-SUICIDE SEVERITY RATING SCALE - C-SSRS
6. HAVE YOU EVER DONE ANYTHING, STARTED TO DO ANYTHING, OR PREPARED TO DO ANYTHING TO END YOUR LIFE?: NO
1. IN THE PAST MONTH, HAVE YOU WISHED YOU WERE DEAD OR WISHED YOU COULD GO TO SLEEP AND NOT WAKE UP?: NO
2. HAVE YOU ACTUALLY HAD ANY THOUGHTS OF KILLING YOURSELF IN THE PAST MONTH?: NO

## 2024-09-13 NOTE — ED TRIAGE NOTES
Patient arrives to the ER with c/o urinary frequency and dark and red tinged urine. States symptoms have been going on the last few days. Patient denies dysuria, fevers, N/V. Afebrile in triage.      Triage Assessment (Adult)       Row Name 09/13/24 4918          Triage Assessment    Airway WDL WDL        Respiratory WDL    Respiratory WDL WDL        Skin Circulation/Temperature WDL    Skin Circulation/Temperature WDL WDL        Cardiac WDL    Cardiac WDL WDL        Peripheral/Neurovascular WDL    Peripheral Neurovascular WDL WDL        Cognitive/Neuro/Behavioral WDL    Cognitive/Neuro/Behavioral WDL WDL

## 2024-09-13 NOTE — DISCHARGE INSTRUCTIONS
Your sodium is slightly high at 147. Please continue with good hydration and follow-up with your regular doctor for re-check of your sodium and electrolytes.  If it is hard to drink plain water, you can add some Crystal light powder to your water to make it more refreshing.    The previously seen stone in your right kidney has appeared to pass.  I do wonder if this is the cause of the blood you have seen in your urine as well as the blood thinner you are on.  It appears the infection in your urine has cleared with the antibiotics that you took. Follow-up with Urology for ongoing blood in your urine.  They will be able to review the repeat CT scan of the abdomen pelvis that we did today.

## 2024-09-13 NOTE — TELEPHONE ENCOUNTER
Patient and spouse called. Patient in anxious state. Patient reporting that she is going to the ED now for ongoing hematuria, as discussed in last OV on 9/5, and wants writer to let PCP know. Patient states the hematuria may be increasing, however, there are times where her urine is clear. She denies any additional symptoms such as fever, back pain, confusion, or increase in frequency. Patient and patient spouse on their way to [facility].     Alejo Camacho RN

## 2024-09-13 NOTE — ED PROVIDER NOTES
EMERGENCY DEPARTMENT ENCOUNTER      NAME: Kalpana Manzo  AGE: 84 year old female  YOB: 1940  MRN: [id number]  EVALUATION DATE & TIME: No admission date for patient encounter.    PCP: Mela Lópze    ED PROVIDER: Yris Haider MD      Chief Complaint   Patient presents with    Urinary Frequency         FINAL IMPRESSION:  1. Hypernatremia    2. Hematuria, unspecified type          ED COURSE & MEDICAL DECISION MAKING:    Pertinent Labs & Imaging studies reviewed. (See chart for details)  84 year old female presents to the Emergency Department for evaluation of hematuria that has been on and off for the last week.  Patient was recently admitted for urosepsis in the ICU.  The patient and her  believe this was about a week ago, this was about 2 weeks ago.  Patient was found to have nephrolithiasis with no ureteral lithiasis.  She was on antibiotics, she currently is not taking any antibiotics.  Aside from the hematuria noted, she has had no other symptoms.  No constitutional symptoms, no other urinary symptoms.  Will plan for screening laboratory studies including UA.  Will also obtain a repeat CT scan to see the positioning of these kidney stones.  Patient is otherwise well-appearing, nontoxic with stable vital signs.    UA with no signs of infection.  No signs of sepsis.  CT scan of the abdomen pelvis demonstrates passing of right kidney stone in the lower pole.  When I spoke with the radiologist he noticed that there were punctate stones in both kidneys.  Patient is also on oral anticoagulation.  Patient is well-appearing, nontoxic with stable vital signs.  Hematuria likely a combination of passing kidney stones and oral anticoagulation.  Hemoglobin is stable.  Patient with slight elevated sodium, encouraged continued oral hydration.  Patient comfortable with discharge home with follow-up with urology as scheduled on Tuesday.    At the conclusion of the encounter I discussed the  results of all of the tests and the disposition. The questions were answered. The patient or family acknowledged understanding and was agreeable with the care plan.     6:08 PM I spoke with the radiologist, Dr. Berrios, confirmed that previously identified right renal stone not seen.   6:10 PM I rechecked and updated patient and discussed results  6:30 PM I spoke with the patient and the patient's  about results and plan to follow-up.     Medical Decision Making  Obtained supplemental history:Supplemental history obtained?: Family Member/Significant Other  Reviewed external records: External records reviewed?: Documented in chart  Care impacted by chronic illness:Chronic Kidney Disease and Hyperlipidemia  Care significantly affected by social determinants of health:N/A  Did you consider but not order tests?: Work up considered but not performed and documented in chart, if applicable  Did you interpret images independently?: Independent interpretation of ECG and images noted in documentation, when applicable.  Consultation discussion with other provider:Did you involve another provider (consultant, MH, pharmacy, etc.)?: No  I considered admission given history of recent urosepsis but given unremarkable workup, patient was discharged home.      MEDICATIONS GIVEN IN THE EMERGENCY:  Medications - No data to display    NEW PRESCRIPTIONS STARTED AT TODAY'S ER VISIT  Discharge Medication List as of 9/13/2024  6:31 PM             =================================================================    HPI    Patient information was obtained from: patient's     Use of : N/A         Kalpana Manzo is a 84 year old female with a pertinent history of HLD, overactive bladder, CKD stage 3, septic shock, and UTI who presents to this ED by walking for evaluation of hematuria.     Per patient's , patient was discharged from the ICU about one week ago. Since discharge, she has had off and on blood in the  urine since discharge home. Patient has not had urinary frequency, dysuria, chills, back pain, diaphoresis, and is not currently on antibiotics.  Patient is on elequis.     Per chart review 08/22/24- 08/24/24, [facility]: patient was hospitalized for urosepsis in ICU. Patient had bilateral kidney stones, and was treated with antibiotics. Patient was discharged home.       PAST MEDICAL HISTORY:  Past Medical History:   Diagnosis Date    Basal cell carcinoma of skin     Created by Conversion  Replacement Utility updated for latest IMO load    Contact dermatitis and other eczema, due to unspecified cause     Created by Conversion     Dermatophytosis of nail     Created by Conversion     Disorder of bone and cartilage     Created by Conversion  Replacement Utility updated for latest IMO load    Hepatitis     Created by Conversion [facility] Annotation: Aug 22 2011  9:[id number] - Mela López: in the 1980's  unspecified  Replacement Utility updated for latest IMO load    Hyperlipidemia     Created by Conversion     Nocturia     Created by Conversion     Osteoarthrosis involving lower leg     Created by Conversion  Replacement Utility updated for latest IMO load    Prediabetes 7/18/2016    Swelling of limb     Created by Conversion        PAST SURGICAL HISTORY:  Past Surgical History:   Procedure Laterality Date    BIOPSY BREAST Left 1987    BIOPSY BREAST Right 1992    BIOPSY OF BREAST, INCISIONAL      Description: Biopsy Breast Open;  Recorded: 05/19/2008;    HC REMOVE TONSILS/ADENOIDS,<13 Y/O      Description: Tonsillectomy With Adenoidectomy;  Recorded: 05/19/2008;    REVISE SECONDARY VARICOSITY      Description: Varicose Vein Ligation;  Recorded: 05/19/2008;    TOTAL KNEE ARTHROPLASTY Right 08/2008    [facility] APPENDECTOMY      Description: Appendectomy;  Recorded: 05/19/2008;           CURRENT MEDICATIONS:    acetaminophen (TYLENOL) 500 MG tablet  apixaban ANTICOAGULANT (ELIQUIS) 5 MG tablet  cholecalciferol,  vitamin D3, (VITAMIN D3) 2,000 unit Tab  conjugated estrogens (PREMARIN) 0.625 MG/GM vaginal cream  GLUCOSAM HCL/CHONDRO MONTES A/C/MN (GLUCOSAMINE-CHONDROITIN COMPLX ORAL)  multivitamin capsule  simvastatin (ZOCOR) 10 MG tablet        ALLERGIES:  Allergies   Allergen Reactions    Sulfamethoxazole-Trimethoprim Dizziness    Tetracycline Nausea       FAMILY HISTORY:  Family History   Problem Relation Age of Onset    Varicose Veins Mother        SOCIAL HISTORY:   Social History     Socioeconomic History    Marital status:    Tobacco Use    Smoking status: Never    Smokeless tobacco: Never   Vaping Use    Vaping status: Never Used   Substance and Sexual Activity    Alcohol use: Yes     Comment: Alcoholic Drinks/day: rare    Drug use: No   Social History Narrative    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      Social Determinants of Health     Financial Resource Strain: Low Risk  (8/23/2024)    Financial Resource Strain     Within the past 12 months, have you or your family members you live with been unable to get utilities (heat, electricity) when it was really needed?: No   Food Insecurity: Low Risk  (8/23/2024)    Food Insecurity     Within the past 12 months, did you worry that your food would run out before you got money to buy more?: No     Within the past 12 months, did the food you bought just not last and you didn t have money to get more?: No   Transportation Needs: Low Risk  (8/23/2024)    Transportation Needs     Within the past 12 months, has lack of transportation kept you from medical appointments, getting your medicines, non-medical meetings or appointments, work, or from getting things that you need?: No   Interpersonal Safety: Low Risk  (8/23/2024)    Interpersonal Safety     Do you feel physically and emotionally safe where you currently live?: Yes     Within the past 12 months, have you been hit, slapped, kicked or otherwise physically hurt by someone?: No     Within the past 12 months, have you been  "humiliated or emotionally abused in other ways by your partner or ex-partner?: No   Housing Stability: Low Risk  (8/23/2024)    Housing Stability     Do you have housing? : Yes     Are you worried about losing your housing?: No       VITALS:  BP (!) 156/75   Pulse 70   Temp 97.6  F (36.4  C) (Oral)   Resp 18   Ht 1.676 m (5' 6\")   Wt 63.5 kg (140 lb)   SpO2 99%   BMI 22.60 kg/m      PHYSICAL EXAM    Constitutional: Well developed, Well nourished, NAD  HENT: Normocephalic, Atraumatic, Bilateral external ears normal, Oropharynx normal, mucous membranes moist, Nose normal.   Neck- Normal range of motion, No tenderness, Supple, No stridor.  Eyes: PERRL, EOMI, Conjunctiva normal, No discharge.   Respiratory: Normal breath sounds, No respiratory distress  Cardiovascular: Normal heart rate, Regular rhythm  GI: Bowel sounds normal, Soft, No tenderness,   Musculoskeletal: No edema. Good range of motion in all major joints. No tenderness to palpation or major deformities noted.   Integument: Warm, Dry, No erythema, No rash  Neurologic: Alert & oriented x 3, Normal motor function, Normal sensory function, No focal deficits noted. Normal gait.   Psychiatric: Affect normal, Judgment normal, Mood normal.      LAB:  All pertinent labs reviewed and interpreted.  Results for orders placed or performed during the hospital encounter of 09/13/24   CT Abdomen Pelvis w/o Contrast    Impression    IMPRESSION:   1. No acute inflammatory findings in the abdomen or pelvis.  2. Unchanged nonobstructing stone lower left kidney. No ureteral stone or hydronephrosis.  3. Large volume stool throughout the colon.   Result Value Ref Range    INR 1.53 (H) 0.85 - 1.15   Partial thromboplastin time   Result Value Ref Range    aPTT 32 22 - 38 Seconds   Basic metabolic panel   Result Value Ref Range    Sodium 147 (H) 135 - 145 mmol/L    Potassium 4.0 3.4 - 5.3 mmol/L    Chloride 106 98 - 107 mmol/L    Carbon Dioxide (CO2) 29 22 - 29 mmol/L    " Anion Gap 12 7 - 15 mmol/L    Urea Nitrogen 17.8 8.0 - 23.0 mg/dL    Creatinine 1.18 (H) 0.51 - 0.95 mg/dL    GFR Estimate 45 (L) >60 mL/min/1.73m2    Calcium 8.8 8.8 - 10.4 mg/dL    Glucose 100 (H) 70 - 99 mg/dL   UA with Microscopic reflex to Culture    Specimen: Urine, Midstream   Result Value Ref Range    Color Urine Yellow Colorless, Straw, Light Yellow, Yellow    Appearance Urine Turbid (A) Clear    Glucose Urine Negative Negative mg/dL    Bilirubin Urine Negative Negative    Ketones Urine Negative Negative mg/dL    Specific Gravity Urine 1.011 1.001 - 1.030    Blood Urine >1.0 mg/dL (A) Negative    pH Urine 7.0 5.0 - 7.0    Protein Albumin Urine 30 (A) Negative mg/dL    Urobilinogen Urine <2.0 <2.0 mg/dL    Nitrite Urine Negative Negative    Leukocyte Esterase Urine 75 Ramiro/uL (A) Negative    Bacteria Urine Few (A) None Seen /HPF    Mucus Urine Present (A) None Seen /LPF    RBC Urine >182 (H) <=2 /HPF    WBC Urine 46 (H) <=5 /HPF    Squamous Epithelials Urine 8 (H) <=1 /HPF   CBC with platelets and differential   Result Value Ref Range    WBC Count 5.7 4.0 - 11.0 10e3/uL    RBC Count 3.83 3.80 - 5.20 10e6/uL    Hemoglobin 11.6 (L) 11.7 - 15.7 g/dL    Hematocrit 36.1 35.0 - 47.0 %    MCV 94 78 - 100 fL    MCH 30.3 26.5 - 33.0 pg    MCHC 32.1 31.5 - 36.5 g/dL    RDW 13.3 10.0 - 15.0 %    Platelet Count 232 150 - 450 10e3/uL    % Neutrophils 48 %    % Lymphocytes 40 %    % Monocytes 9 %    % Eosinophils 2 %    % Basophils 1 %    % Immature Granulocytes 0 %    NRBCs per 100 WBC 0 <1 /100    Absolute Neutrophils 2.7 1.6 - 8.3 10e3/uL    Absolute Lymphocytes 2.3 0.8 - 5.3 10e3/uL    Absolute Monocytes 0.5 0.0 - 1.3 10e3/uL    Absolute Eosinophils 0.1 0.0 - 0.7 10e3/uL    Absolute Basophils 0.0 0.0 - 0.2 10e3/uL    Absolute Immature Granulocytes 0.0 <=0.4 10e3/uL    Absolute NRBCs 0.0 10e3/uL       RADIOLOGY:  Reviewed all pertinent imaging. Please see official radiology report.  CT Abdomen Pelvis w/o Contrast    Final Result   IMPRESSION:    1. No acute inflammatory findings in the abdomen or pelvis.   2. Unchanged nonobstructing stone lower left kidney. No ureteral stone or hydronephrosis.   3. Large volume stool throughout the colon.              I, Kenrick Leung, am serving as a scribe to document services personally performed by Yris Haider MD, based on my observation and the provider's statements to me. I, Yris Haider MD, attest that Kenrick Leung is acting in a scribe capacity, has observed my performance of the services and has documented them in accordance with my direction.    Yris Haider MD  Emergency Medicine  [facility] EMERGENCY ROOM  Critical access hospital[address] [facility] [address] [phone]      Yris Haider MD  09/13/24 [id number]

## 2024-09-15 LAB — BACTERIA UR CULT: NORMAL

## 2024-09-17 ENCOUNTER — VIRTUAL VISIT (OUTPATIENT)
Dept: UROLOGY | Facility: CLINIC | Age: 84
End: 2024-09-17
Payer: COMMERCIAL

## 2024-09-17 VITALS — BODY MASS INDEX: 22.6 KG/M2 | WEIGHT: 140 LBS

## 2024-09-17 DIAGNOSIS — N20.0 NEPHROLITHIASIS: Primary | ICD-10-CM

## 2024-09-17 PROCEDURE — 99214 OFFICE O/P EST MOD 30 MIN: CPT | Mod: 95 | Performed by: NURSE PRACTITIONER

## 2024-09-17 NOTE — PROGRESS NOTES
Urology Video Office Visit    Video-Visit Details    Type of service:  Video Visit    Video Start Time: 1340    Video End Time: 1408    Originating Location (pt. Location): Home    Distant Location (provider location):  Off-site     Platform used for Video Visit: [employer]           Assessment and Plan:     Assessment:84 year old female with nephrolithiasis    Plan:  -Reviewed CT scan with patient and . Discussed large left 1.6cm nonobstructing lower pole stone.   -Discussed option of a definitive stone procedure. Discussed concern of a stone procedure with use of Eliquis. Discussed relation of renal stones and Jhon's. Concern that left renal stone may be seeding UTI's. Recommend to please keep follow up appointment with Dr. Juares to discuss definitive stone options.   -Recommend to continue with adequate fluid intake with goal of 80-90oz per day. Recommend to continue on vaginal estradiol cream.   -If having severe flank pain, fevers, chills, nausea, or vomiting please notify Urology clinic or be seen in the ER.     Silvia Shaikh CNP  Department of Urology  September 16, 2024    I spent a total of 30 minutes spent on the date of the encounter doing chart review, history and exam, documentation, and further activities as noted above.          Chief Complaint:   Nephrolithiasis, Jhon's, Gross hematuria.          History of Present Illness:    Kalpana Manzo is a pleasant 84 year old female who presents with her  for concerns of a hematuria.     Ms. Manzo was seen in the ER on 09/13/24 for concerns of gross hematuria. CT scan on 09/13/24 (images personally reviewed) revealed stable  left lower pole stone. Noted right 2mm renal stone which was seen on CT scan on 08/22/4 is no longer seen on imaging.    She is doing well at this time time. Denies any flank pain, fevers, chills, nausea, vomiting, hematuria, or dysuria.     She is currently on estradiol cream to help with Jhon's. Denies any concerns  with use of cream.     She was recently admitted from 08/22-08/24/24 for urosepsis s/s to UTI. Urine culture revealed mixed urogenital lloyd however blood cultures positive for e.Coli. CT scan on 08/22/24 (images personally reviewed) revealed a right 2mm nonobstructing renal stone and left 1.6cm left nonobstructing lower pole stone. She continues to be asymptomatic.    This is her first stone. Denies any family history of nephrolithiasis.      Stone Risk Factors: none     She is currently on Eliquis at this time.     She and her  do winter in [place]. They leave in Nov 1st 2024 and return May 1st.            Past Medical History:     Past Medical History:   Diagnosis Date    Basal cell carcinoma of skin     Created by Conversion  Replacement Utility updated for latest IMO load    Contact dermatitis and other eczema, due to unspecified cause     Created by Conversion     Dermatophytosis of nail     Created by Conversion     Disorder of bone and cartilage     Created by Conversion  Replacement Utility updated for latest IMO load    Hepatitis     Created by Conversion [facility] Annotation: Aug 22 2011  [id number] - Mela López: in the 1980's  unspecified  Replacement Utility updated for latest IMO load    Hyperlipidemia     Created by Conversion     Nocturia     Created by Conversion     Osteoarthrosis involving lower leg     Created by Conversion  Replacement Utility updated for latest IMO load    Prediabetes 7/18/2016    Swelling of limb     Created by Conversion             Past Surgical History:     Past Surgical History:   Procedure Laterality Date    BIOPSY BREAST Left 1987    BIOPSY BREAST Right 1992    BIOPSY OF BREAST, INCISIONAL      Description: Biopsy Breast Open;  Recorded: 05/19/2008;    HC REMOVE TONSILS/ADENOIDS,<11 Y/O      Description: Tonsillectomy With Adenoidectomy;  Recorded: 05/19/2008;    REVISE SECONDARY VARICOSITY      Description: Varicose Vein Ligation;  Recorded: 05/19/2008;     TOTAL KNEE ARTHROPLASTY Right 08/2008    [facility] APPENDECTOMY      Description: Appendectomy;  Recorded: 05/19/2008;            Medications     Current Outpatient Medications   Medication Sig Dispense Refill    acetaminophen (TYLENOL) 500 MG tablet Take 500-1,000 mg by mouth every 6 hours as needed for mild pain      apixaban ANTICOAGULANT (ELIQUIS) 5 MG tablet Take 1 tablet (5 mg) by mouth 2 times daily 180 tablet 3    cholecalciferol, vitamin D3, (VITAMIN D3) 2,000 unit Tab Take 1,000 Units by mouth daily      conjugated estrogens (PREMARIN) 0.625 MG/GM vaginal cream Place 0.5 g vaginally twice a week. Apply pea sized amount at tip of finger and apply to the urethra daily for first 2 week then twice weekly 30 g 3    GLUCOSAM HCL/CHONDRO MONTES A/C/MN (GLUCOSAMINE-CHONDROITIN COMPLX ORAL) [GLUCOSAM HCL/CHONDRO MONTES A/C/MN (GLUCOSAMINE-CHONDROITIN COMPLX ORAL)] Take 1 tablet by mouth daily. Tablet      multivitamin capsule [MULTIVITAMIN CAPSULE] Take 1 capsule by mouth daily.      simvastatin (ZOCOR) 10 MG tablet TAKE 1 TABLET (10 MG) BY MOUTH DAILY. 90 tablet 1     No current facility-administered medications for this visit.            Family History:     Family History   Problem Relation Age of Onset    Varicose Veins Mother             Social History:     Social History     Socioeconomic History    Marital status:      Spouse name: Not on file    Number of children: Not on file    Years of education: Not on file    Highest education level: Not on file   Occupational History    Not on file   Tobacco Use    Smoking status: Never    Smokeless tobacco: Never   Vaping Use    Vaping status: Never Used   Substance and Sexual Activity    Alcohol use: Yes     Comment: Alcoholic Drinks/day: rare    Drug use: No    Sexual activity: Not on file   Other Topics Concern    Not on file   Social History Narrative    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      Social Determinants of Health     Financial Resource Strain: Low Risk  (8/23/2024)     Financial Resource Strain     Within the past 12 months, have you or your family members you live with been unable to get utilities (heat, electricity) when it was really needed?: No   Food Insecurity: Low Risk  (8/23/2024)    Food Insecurity     Within the past 12 months, did you worry that your food would run out before you got money to buy more?: No     Within the past 12 months, did the food you bought just not last and you didn t have money to get more?: No   Transportation Needs: Low Risk  (8/23/2024)    Transportation Needs     Within the past 12 months, has lack of transportation kept you from medical appointments, getting your medicines, non-medical meetings or appointments, work, or from getting things that you need?: No   Physical Activity: Not on file   Stress: Not on file   Social Connections: Not on file   Interpersonal Safety: Low Risk  (8/23/2024)    Interpersonal Safety     Do you feel physically and emotionally safe where you currently live?: Yes     Within the past 12 months, have you been hit, slapped, kicked or otherwise physically hurt by someone?: No     Within the past 12 months, have you been humiliated or emotionally abused in other ways by your partner or ex-partner?: No   Housing Stability: Low Risk  (8/23/2024)    Housing Stability     Do you have housing? : Yes     Are you worried about losing your housing?: No            Allergies:   Sulfamethoxazole-trimethoprim and Tetracycline         Review of Systems:  From intake questionnaire   Negative 14 system review except as noted on HPI, nurse's note.         Physical Exam:   General Appearance: Well groomed, hygenic  Eyes: No redness, discharge  Respiratory: No cough, no respiratory distress or labored breathing  Musculoskeletal: Grossly normal, full range of motion in upper extremities, no gross deficits  Skin: No discoloration or apparent rashes  Neurologic - No tremors  Psychiatric - Alert and oriented  The rest of a comprehensive  physical examination is deferred due to video visit restrictions        Labs:    I personally reviewed all applicable laboratory data and went over findings with patient  Significant for:    CBC RESULTS:  Recent Labs   Lab Test 09/13/24  1734 09/05/24  1638 08/23/24  0632 08/23/24  0126   WBC 5.7 9.9 13.1* 14.0*   HGB 11.6* 11.3* 10.7* 11.1*    324 131* 140*        BMP RESULTS:  Recent Labs   Lab Test 09/13/24  1734 09/05/24  1638 08/29/24  0916 08/24/24  1234 08/30/21  1010 07/06/20  0913 08/12/19  0842 08/06/18  0842   * 143 145 139   < > 143 145 144   POTASSIUM 4.0 3.5 3.4 3.6   < > 4.1 4.0 4.1   CHLORIDE 106 107 109* 110*   < > 106 108* 108*   CO2 29 23 21* 18*   < > 27 31 27   ANIONGAP 12 13 15 11   < > 10 6 9   * 92 145* 142*   < > 90 93 88   BUN 17.8 16.2 16.2 31.3*   < > 23 19 19   CR 1.18* 1.13* 1.15* 1.47*   < > 0.96 0.93 0.88   GFRESTIMATED 45* 48* 47* 35*   < > 56* 58* >60   GFRESTBLACK  --   --   --   --   --  >60 >60 >60   DAVID 8.8 8.8 8.1* 8.2*   < > 9.1 9.8 9.6    < > = values in this interval not displayed.       UA RESULTS:   Recent Labs   Lab Test 09/13/24  1637 09/06/24  0600 08/29/24  0830   SG 1.011 1.015 1.009   URINEPH 7.0 7.0 6.5   NITRITE Negative Negative Negative   RBCU >182* 10-25* 72*   WBCU 46* 5-10* 5       CALCIUM RESULTS  Lab Results   Component Value Date    DAVID 8.8 09/13/2024    DAVID 8.8 09/05/2024    DAVID 8.1 08/29/2024           Imaging:    I personally reviewed all applicable imaging and went over the below findings with patient.    Results for orders placed or performed during the hospital encounter of 09/13/24   CT Abdomen Pelvis w/o Contrast    Narrative    EXAM: CT ABDOMEN PELVIS W/O CONTRAST  LOCATION: [facility]  DATE: 9/13/2024    INDICATION: Hematuria. Known nephrolithiasis and recent urosepsis.  COMPARISON: CT chest abdomen pelvis 7/3/2024.  TECHNIQUE: CT scan of the abdomen and pelvis was performed without IV contrast.  Multiplanar reformats were obtained. Dose reduction techniques were used.  CONTRAST: None.    FINDINGS:   LOWER CHEST: Mild linear scarring or atelectasis in the lower lungs.    HEPATOBILIARY: Cholelithiasis. Normal noncontrast liver.    PANCREAS: Ill-defined heterogeneity in the pancreatic tail, poorly characterized on this noncontrast study as compared to 7/3/2024. Appearance is grossly unchanged. No peripancreatic inflammatory change or fluid collection.    SPLEEN: Normal.    ADRENAL GLANDS: Normal.    KIDNEYS/BLADDER: Nonobstructing 7 x 9 mm stone lower left kidney. No ureteral stone or hydronephrosis. No perinephric stranding or fluid collection. Normal bladder, no stone.    BOWEL: Moderate sigmoid colonic diverticulosis. No acute findings. No evidence of diverticulitis or colitis. No evidence of bowel obstruction. Large volume stool scattered throughout the colon. No free air, free fluid or abscess.    LYMPH NODES: Normal.    VASCULATURE: Moderate atherosclerotic calcifications. Normal caliber abdominal aorta.    PELVIC ORGANS: Normal.    MUSCULOSKELETAL: Mild thoracolumbar curve. Moderate scattered degenerative changes in the spine. No suspicious osseous lesion. Old right rib fractures.      Impression    IMPRESSION:   1. No acute inflammatory findings in the abdomen or pelvis.  2. Unchanged nonobstructing stone lower left kidney. No ureteral stone or hydronephrosis.  3. Large volume stool throughout the colon.

## 2024-09-17 NOTE — PATIENT INSTRUCTIONS
UROLOGY CLINIC VISIT PATIENT INSTRUCTIONS    Website for lower cost vaginal estrogen.     Good Rx: take coupon directly to pharmacy. Please let me know what pharmacy you prefer or use.   Website: [email or web address]    [facility]  Please make an account with [facility]. Vaginal estrogen cream will be mailed to you directly. You will need to pay shipping and handling costs.   Website: [email or web address]    If having severe flank pain, fevers >101.0F, chills, lethargic, confusion, nausea, or vomiting please notify Urology clinic or be seen in the ER.     Recommend to keep follow up with Dr. Juares to discuss definitive stone procedure options.     If you have any issues, questions or concerns in the meantime, do not hesitate to contact us at [facility] at [phone] or via [place].     Silvia Shaikh CNP  Department of Urology

## 2024-09-19 ENCOUNTER — TELEPHONE (OUTPATIENT)
Dept: FAMILY MEDICINE | Facility: CLINIC | Age: 84
End: 2024-09-19

## 2024-09-19 NOTE — TELEPHONE ENCOUNTER
Spoke with patient. Per provider message below, order resent to [facility]. Patient will call to see if [facility] can accommodate sooner appointment than patient is currently scheduled with [facility] (11/8/24). Patient will update clinic if anything else is needed.    From: Mela López MD   Sent: 9/11/2024   3:36 PM CDT   To: Katie Saenz     This patient was hospitalized and had to have the procedure canceled to biopsy her pancreatic lesion.  She is quite worried about it and got back on the schedule but not until November.  I am wondering if we could call over [facility] to see if there is any chance of getting her in somewhere sooner with Dr. Aldana?  Also, wondering if there is a [facility] gastroenterologist who might have the ability to do it sooner?

## 2024-09-23 ENCOUNTER — OFFICE VISIT (OUTPATIENT)
Dept: FAMILY MEDICINE | Facility: CLINIC | Age: 84
End: 2024-09-23
Payer: COMMERCIAL

## 2024-09-23 VITALS
RESPIRATION RATE: 14 BRPM | OXYGEN SATURATION: 99 % | HEIGHT: 66 IN | DIASTOLIC BLOOD PRESSURE: 74 MMHG | TEMPERATURE: 97.7 F | HEART RATE: 87 BPM | BODY MASS INDEX: 23.14 KG/M2 | SYSTOLIC BLOOD PRESSURE: 120 MMHG | WEIGHT: 144 LBS

## 2024-09-23 DIAGNOSIS — E78.5 HYPERLIPIDEMIA, UNSPECIFIED HYPERLIPIDEMIA TYPE: ICD-10-CM

## 2024-09-23 DIAGNOSIS — Z00.00 ENCOUNTER FOR MEDICARE ANNUAL WELLNESS EXAM: Primary | ICD-10-CM

## 2024-09-23 DIAGNOSIS — R73.03 PREDIABETES: ICD-10-CM

## 2024-09-23 DIAGNOSIS — N20.0 NEPHROLITHIASIS: ICD-10-CM

## 2024-09-23 DIAGNOSIS — I82.512 CHRONIC DEEP VEIN THROMBOSIS (DVT) OF FEMORAL VEIN OF LEFT LOWER EXTREMITY (H): ICD-10-CM

## 2024-09-23 DIAGNOSIS — M94.9 DISORDER OF BONE AND CARTILAGE: ICD-10-CM

## 2024-09-23 DIAGNOSIS — M89.9 DISORDER OF BONE AND CARTILAGE: ICD-10-CM

## 2024-09-23 DIAGNOSIS — N18.31 CHRONIC KIDNEY DISEASE, STAGE 3A (H): ICD-10-CM

## 2024-09-23 DIAGNOSIS — I51.3 ATRIAL THROMBUS: ICD-10-CM

## 2024-09-23 DIAGNOSIS — K86.89 PANCREATIC MASS: ICD-10-CM

## 2024-09-23 PROBLEM — I10 BENIGN ESSENTIAL HYPERTENSION: Status: RESOLVED | Noted: 2023-05-22 | Resolved: 2024-09-23

## 2024-09-23 PROBLEM — A41.9 SEPTIC SHOCK (H): Status: RESOLVED | Noted: 2024-08-22 | Resolved: 2024-09-23

## 2024-09-23 PROBLEM — R65.21 SEPTIC SHOCK (H): Status: RESOLVED | Noted: 2024-08-22 | Resolved: 2024-09-23

## 2024-09-23 LAB
ALBUMIN SERPL BCG-MCNC: 3.8 G/DL (ref 3.5–5.2)
ALP SERPL-CCNC: 74 U/L (ref 40–150)
ALT SERPL W P-5'-P-CCNC: 6 U/L (ref 0–50)
ANION GAP SERPL CALCULATED.3IONS-SCNC: 9 MMOL/L (ref 7–15)
AST SERPL W P-5'-P-CCNC: 22 U/L (ref 0–45)
BILIRUB SERPL-MCNC: 0.5 MG/DL
BUN SERPL-MCNC: 14.3 MG/DL (ref 8–23)
CALCIUM SERPL-MCNC: 8.9 MG/DL (ref 8.8–10.4)
CHLORIDE SERPL-SCNC: 107 MMOL/L (ref 98–107)
CHOLEST SERPL-MCNC: 145 MG/DL
CREAT SERPL-MCNC: 1.18 MG/DL (ref 0.51–0.95)
EGFRCR SERPLBLD CKD-EPI 2021: 45 ML/MIN/1.73M2
EST. AVERAGE GLUCOSE BLD GHB EST-MCNC: 105 MG/DL
FASTING STATUS PATIENT QL REPORTED: NO
FASTING STATUS PATIENT QL REPORTED: NO
GLUCOSE SERPL-MCNC: 82 MG/DL (ref 70–99)
HBA1C MFR BLD: 5.3 % (ref 0–5.6)
HCO3 SERPL-SCNC: 26 MMOL/L (ref 22–29)
HDLC SERPL-MCNC: 56 MG/DL
LDLC SERPL CALC-MCNC: 73 MG/DL
NONHDLC SERPL-MCNC: 89 MG/DL
POTASSIUM SERPL-SCNC: 4.2 MMOL/L (ref 3.4–5.3)
PROT SERPL-MCNC: 6.8 G/DL (ref 6.4–8.3)
SODIUM SERPL-SCNC: 142 MMOL/L (ref 135–145)
TRIGL SERPL-MCNC: 82 MG/DL

## 2024-09-23 PROCEDURE — G0008 ADMIN INFLUENZA VIRUS VAC: HCPCS | Performed by: FAMILY MEDICINE

## 2024-09-23 PROCEDURE — 80053 COMPREHEN METABOLIC PANEL: CPT | Performed by: FAMILY MEDICINE

## 2024-09-23 PROCEDURE — G0438 PPPS, INITIAL VISIT: HCPCS | Performed by: FAMILY MEDICINE

## 2024-09-23 PROCEDURE — 90480 ADMN SARSCOV2 VAC 1/ONLY CMP: CPT | Performed by: FAMILY MEDICINE

## 2024-09-23 PROCEDURE — 83036 HEMOGLOBIN GLYCOSYLATED A1C: CPT | Performed by: FAMILY MEDICINE

## 2024-09-23 PROCEDURE — 90662 IIV NO PRSV INCREASED AG IM: CPT | Performed by: FAMILY MEDICINE

## 2024-09-23 PROCEDURE — 91320 SARSCV2 VAC 30MCG TRS-SUC IM: CPT | Performed by: FAMILY MEDICINE

## 2024-09-23 PROCEDURE — 36415 COLL VENOUS BLD VENIPUNCTURE: CPT | Performed by: FAMILY MEDICINE

## 2024-09-23 PROCEDURE — 99214 OFFICE O/P EST MOD 30 MIN: CPT | Mod: 25 | Performed by: FAMILY MEDICINE

## 2024-09-23 PROCEDURE — 80061 LIPID PANEL: CPT | Performed by: FAMILY MEDICINE

## 2024-09-23 NOTE — ASSESSMENT & PLAN NOTE
She has an appointment with a urologist in November to follow-up regarding the large nonobstructing kidney stone still present.  She has been seen for gross hematuria recently but states that that has significantly improved over the past week.

## 2024-09-23 NOTE — ASSESSMENT & PLAN NOTE
The patient is in need of an EUS with biopsy and is currently scheduled for November 8.  She is quite anxious regarding this and would like the appointment moved up sooner as she had a canceled this procedure recently due to hospitalization for urosepsis.  We will reach out to see if we can assist in getting her on the schedule sooner than 6 weeks from now.

## 2024-09-23 NOTE — ASSESSMENT & PLAN NOTE
The patient has seen cardiology who recommends continuing on long-term Eliquis because of this finding.

## 2024-09-23 NOTE — PROGRESS NOTES
Preventive Care Visit  [facility]  TANIKA YU MD, Family Medicine  Sep 23, 2024      Assessment & Plan   Problem List Items Addressed This Visit       Hyperlipidemia     Continues on simvastatin 10 mg daily.  Updated lipid panel checked today.         Relevant Orders    Lipid panel reflex to direct LDL Non-fasting    Osteopenia     The patient has completed 5 years of alendronate therapy.  She will be due for follow-up DEXA scan in October 2025.         Prediabetes     Checked updated A1c today.         Relevant Orders    Hemoglobin A1c (Completed)    Chronic kidney disease, stage 3a (H)    Relevant Orders    Albumin Random Urine Quantitative with Creat Ratio    Comprehensive metabolic panel (BMP + Alb, Alk Phos, ALT, AST, Total. Bili, TP)    Albumin Random Urine Quantitative with Creat Ratio    Chronic deep vein thrombosis (DVT) of femoral vein of left lower extremity (H)     The patient was diagnosed with an extensive DVT as well as an atrial thrombus late spring time.  She continues on Eliquis.         Atrial thrombus     The patient has seen cardiology who recommends continuing on long-term Eliquis because of this finding.         Nephrolithiasis     She has an appointment with a urologist in November to follow-up regarding the large nonobstructing kidney stone still present.  She has been seen for gross hematuria recently but states that that has significantly improved over the past week.         Pancreatic mass     The patient is in need of an EUS with biopsy and is currently scheduled for November 8.  She is quite anxious regarding this and would like the appointment moved up sooner as she had a canceled this procedure recently due to hospitalization for urosepsis.  We will reach out to see if we can assist in getting her on the schedule sooner than 6 weeks from now.          Other Visit Diagnoses       Encounter for Medicare annual wellness exam    -  Primary                Counseling  Appropriate preventive services were addressed with this patient via screening, questionnaire, or discussion as appropriate for fall prevention, nutrition, physical activity, Tobacco-use cessation, social engagement, weight loss and cognition.  Checklist reviewing preventive services available has been given to the patient.  Reviewed patient's diet, addressing concerns and/or questions.   She is at risk for lack of exercise and has been provided with information to increase physical activity for the benefit of her well-being.   She is at risk for psychosocial distress and has been provided with information to reduce risk.   The patient was provided with written information regarding signs of hearing loss.   Information on urinary incontinence and treatment options given to patient.         Rowena Acuna is a 84 year old who presents today for an annual wellness visit.  The patient has had an eventful last few months as a relates to health changes.  She and her  typically spend the winter in [place] which is where she presented in May with an extensive DVT involving her left leg and was found to have an atrial thrombus in her heart.  She has been on Eliquis since that time and has been recommended to continuing on that long-term.  The patient had a fall and injured her ribs.  A CT scan this summer revealed a pancreatic lesion that had follow-up including an MRI and an EUS procedure was recommended for better clarity and ultrasound.  She had this scheduled at the end of August but then was found to have urosepsis at the time of her preop physical and the procedure got postponed until November.  She is quite anxious about waiting that long and would appreciate any way to help get her back on the schedule sooner.  The patient also mentions that she was prescribed topical Premarin cream to apply to the urethra.  However, she has discomfort from the cream when she applies it and wonders if she  needs to continue with it.  Wellness Visit        9/23/2024     8:50 AM   Additional Questions   Roomed by as   Accompanied by        Health Care Directive  Patient has a Health Care Directive on file          9/23/2024   General Health   How would you rate your overall physical health? Good   Feel stress (tense, anxious, or unable to sleep) Only a little      (!) STRESS CONCERN      9/23/2024   Nutrition   Diet: Regular (no restrictions)            9/23/2024   Exercise   Days per week of moderate/strenous exercise 2 days      (!) EXERCISE CONCERN      9/23/2024   Social Factors   Frequency of gathering with friends or relatives Twice a week   Worry food won't last until get money to buy more No   Food not last or not have enough money for food? No   Do you have housing? (Housing is defined as stable permanent housing and does not include staying ouside in a car, in a tent, in an abandoned building, in an overnight shelter, or couch-surfing.) Yes   Are you worried about losing your housing? No   Lack of transportation? No   Unable to get utilities (heat,electricity)? No            9/23/2024   Fall Risk   Fallen 2 or more times in the past year? No    No   Trouble with walking or balance? No    No       Multiple values from one day are sorted in reverse-chronological order          9/23/2024   Activities of Daily Living- Home Safety   Needs help with the following daily activites None of the above   Safety concerns in the home None of the above            9/23/2024   Dental   Dentist two times every year? Yes            9/23/2024   Hearing Screening   Hearing concerns? (!) IT'S HARD TO FOLLOW A CONVERSATION IN A NOISY RESTAURANT OR CROWDED ROOM.            9/23/2024   Driving Risk Screening   Patient/family members have concerns about driving No            9/23/2024   General Alertness/Fatigue Screening   Have you been more tired than usual lately? No            9/23/2024   Urinary Incontinence Screening    Bothered by leaking urine in past 6 months Yes            9/23/2024   TB Screening   Were you born outside of the [place]? No            Today's PHQ-2 Score:       9/23/2024     8:42 AM   PHQ-2 ( 1999 [employer])   Q1: Little interest or pleasure in doing things 0   Q2: Feeling down, depressed or hopeless 0   PHQ-2 Score 0   Q1: Little interest or pleasure in doing things Not at all   Q2: Feeling down, depressed or hopeless Not at all   PHQ-2 Score 0           9/23/2024   Substance Use   Alcohol more than 3/day or more than 7/wk Not Applicable   Do you have a current opioid prescription? No   How severe/bad is pain from 1 to 10? 0/10 (No Pain)   Do you use any other substances recreationally? No        Social History     Tobacco Use    Smoking status: Never    Smokeless tobacco: Never   Vaping Use    Vaping status: Never Used   Substance Use Topics    Alcohol use: Yes     Comment: Alcoholic Drinks/day: rare    Drug use: No           8/14/2024   LAST FHS-7 RESULTS   1st degree relative breast or ovarian cancer No   Any relative bilateral breast cancer No   Any male have breast cancer No   Any ONE woman have BOTH breast AND ovarian cancer No   Any woman with breast cancer before 50yrs No   2 or more relatives with breast AND/OR ovarian cancer No   2 or more relatives with breast AND/OR bowel cancer No         Mammogram Screening - After age 74- determine frequency with patient based on health status, life expectancy and patient goals      Reviewed and updated as needed this visit by Provider                    Patient Active Problem List   Diagnosis    Basal Cell Carcinoma Of The Skin    Hyperlipidemia    Onychomycosis    Osteopenia    Osteoarthritis Of The Knee    Eczema    Limb Swelling    Prediabetes    Varicose vein of leg    Midline cystocele    Seasonal allergic rhinitis    Overactive bladder    Hoarseness    Chronic kidney disease, stage 3a (H)    Chronic deep vein thrombosis (DVT) of femoral vein of left lower  extremity (H)    Atrial thrombus    Abnormal stress test    Nephrolithiasis    Pancreatic mass    Complicated UTI (urinary tract infection)    Hematuria, unspecified type     Past Surgical History:   Procedure Laterality Date    BIOPSY BREAST Left 1987    BIOPSY BREAST Right 1992    BIOPSY OF BREAST, INCISIONAL      Description: Biopsy Breast Open;  Recorded: 05/19/2008;    HC REMOVE TONSILS/ADENOIDS,<13 Y/O      Description: Tonsillectomy With Adenoidectomy;  Recorded: 05/19/2008;    REVISE SECONDARY VARICOSITY      Description: Varicose Vein Ligation;  Recorded: 05/19/2008;    TOTAL KNEE ARTHROPLASTY Right 08/2008    [facility] APPENDECTOMY      Description: Appendectomy;  Recorded: 05/19/2008;       Social History     Tobacco Use    Smoking status: Never    Smokeless tobacco: Never   Substance Use Topics    Alcohol use: Yes     Comment: Alcoholic Drinks/day: rare     Family History   Problem Relation Age of Onset    Varicose Veins Mother          Current Outpatient Medications   Medication Sig Dispense Refill    acetaminophen (TYLENOL) 500 MG tablet Take 500-1,000 mg by mouth every 6 hours as needed for mild pain      apixaban ANTICOAGULANT (ELIQUIS) 5 MG tablet Take 1 tablet (5 mg) by mouth 2 times daily 180 tablet 3    cholecalciferol, vitamin D3, (VITAMIN D3) 2,000 unit Tab Take 1,000 Units by mouth daily      GLUCOSAM HCL/CHONDRO MONTES A/C/MN (GLUCOSAMINE-CHONDROITIN COMPLX ORAL) [GLUCOSAM HCL/CHONDRO MONTES A/C/MN (GLUCOSAMINE-CHONDROITIN COMPLX ORAL)] Take 1 tablet by mouth daily. Tablet      multivitamin capsule [MULTIVITAMIN CAPSULE] Take 1 capsule by mouth daily.      simvastatin (ZOCOR) 10 MG tablet TAKE 1 TABLET (10 MG) BY MOUTH DAILY. 90 tablet 1     Allergies   Allergen Reactions    Sulfamethoxazole-Trimethoprim Dizziness    Tetracycline Nausea     Current providers sharing in care for this patient include:  Patient Care Team:  Mela López MD as PCP - General  Mela López MD as Assigned  "PCP  Elias Fernández MD as Assigned Heart and Vascular Provider  Silvia Shaikh APRN CNP as Nurse Practitioner (Urology)  Elias Fernández MD as MD (Cardiology)  Silvia Shaikh APRN CNP as Assigned Surgical Provider  Dangelo Juares MD as MD (Urology)  Sridhar Paniagua MD as Physician (Urology)  Jerrod Rodríguez MD as Hospitalist (HOSPITALIST)    The following health maintenance items are reviewed in Epic and correct as of today:  Health Maintenance   Topic Date Due    MICROALBUMIN  Never done    ANNUAL REVIEW OF HM ORDERS  05/02/2023    LIPID  05/22/2024    MAMMO SCREENING  08/14/2025    BMP  09/13/2025    HEMOGLOBIN  09/13/2025    MEDICARE ANNUAL WELLNESS VISIT  09/23/2025    FALL RISK ASSESSMENT  09/23/2025    DTAP/TDAP/TD IMMUNIZATION (5 - Td or Tdap) 07/24/2027    ADVANCE CARE PLANNING  09/20/2028    DEXA  10/23/2038    PHQ-2 (once per calendar year)  Completed    INFLUENZA VACCINE  Completed    Pneumococcal Vaccine: 65+ Years  Completed    URINALYSIS  Completed    ZOSTER IMMUNIZATION  Completed    RSV VACCINE  Completed    COVID-19 Vaccine  Completed    HPV IMMUNIZATION  Aged Out    MENINGITIS IMMUNIZATION  Aged Out    RSV MONOCLONAL ANTIBODY  Aged Out    COLORECTAL CANCER SCREENING  Discontinued          Objective    Exam  /74 (BP Location: Left arm, Patient Position: Left side, Cuff Size: Adult Regular)   Pulse 87   Temp 97.7  F (36.5  C) (Oral)   Resp 14   Ht 1.676 m (5' 6\")   Wt 65.3 kg (144 lb)   SpO2 99%   BMI 23.24 kg/m     Estimated body mass index is 23.24 kg/m  as calculated from the following:    Height as of this encounter: 1.676 m (5' 6\").    Weight as of this encounter: 65.3 kg (144 lb).    Physical Exam  GENERAL: alert and no distress  EYES: Eyes grossly normal to inspection, PERRL and conjunctivae and sclerae normal  HENT: ear canals and TM's normal, nose and mouth without ulcers or lesions  NECK: no adenopathy, no asymmetry, masses, or scars  RESP: " lungs clear to auscultation - no rales, rhonchi or wheezes  BREAST: normal without masses, tenderness or nipple discharge and no palpable axillary masses or adenopathy  CV: regular rate and rhythm, normal S1 S2, no S3 or S4, no murmur, click or rub, no peripheral edema  ABDOMEN: soft, nontender, no hepatosplenomegaly, no masses and bowel sounds normal   (female) the patient does have some apparent mild erythema around the urethra and surrounding tissues.  MS: no gross musculoskeletal defects noted, no edema  SKIN: no suspicious lesions or rashes  NEURO: Normal strength and tone, mentation intact and speech normal  PSYCH: mentation appears normal, affect normal/bright         9/23/2024   Mini Cog   Clock Draw Score 2 Normal   3 Item Recall 3 objects recalled   Mini Cog Total Score 5                 Signed Electronically by: TANIKA YU MD

## 2024-09-23 NOTE — ASSESSMENT & PLAN NOTE
The patient has completed 5 years of alendronate therapy.  She will be due for follow-up DEXA scan in October 2025.

## 2024-09-23 NOTE — ASSESSMENT & PLAN NOTE
The patient was diagnosed with an extensive DVT as well as an atrial thrombus late spring time.  She continues on Eliquis.

## 2024-09-23 NOTE — PATIENT INSTRUCTIONS
Patient Education   Preventive Care Advice   This is general advice given by our system to help you stay healthy. However, your care team may have specific advice just for you. Please talk to your care team about your preventive care needs.  Nutrition  Eat 5 or more servings of fruits and vegetables each day.  Try wheat bread, brown rice and whole grain pasta (instead of white bread, rice, and pasta).  Get enough calcium and vitamin D. Check the label on foods and aim for 100% of the RDA (recommended daily allowance).  Lifestyle  Exercise at least 150 minutes each week  (30 minutes a day, 5 days a week).  Do muscle strengthening activities 2 days a week. These help control your weight and prevent disease.  No smoking.  Wear sunscreen to prevent skin cancer.  Have a dental exam and cleaning every 6 months.  Yearly exams  See your health care team every year to talk about:  Any changes in your health.  Any medicines your care team has prescribed.  Preventive care, family planning, and ways to prevent chronic diseases.  Shots (vaccines)   HPV shots (up to age 26), if you've never had them before.  Hepatitis B shots (up to age 59), if you've never had them before.  COVID-19 shot: Get this shot when it's due.  Flu shot: Get a flu shot every year.  Tetanus shot: Get a tetanus shot every 10 years.  Pneumococcal, hepatitis A, and RSV shots: Ask your care team if you need these based on your risk.  Shingles shot (for age 50 and up)  General health tests  Diabetes screening:  Starting at age 35, Get screened for diabetes at least every 3 years.  If you are younger than age 35, ask your care team if you should be screened for diabetes.  Cholesterol test: At age 39, start having a cholesterol test every 5 years, or more often if advised.  Bone density scan (DEXA): At age 50, ask your care team if you should have this scan for osteoporosis (brittle bones).  Hepatitis C: Get tested at least once in your life.  STIs (sexually  transmitted infections)  Before age 24: Ask your care team if you should be screened for STIs.  After age 24: Get screened for STIs if you're at risk. You are at risk for STIs (including HIV) if:  You are sexually active with more than one person.  You don't use condoms every time.  You or a partner was diagnosed with a sexually transmitted infection.  If you are at risk for HIV, ask about PrEP medicine to prevent HIV.  Get tested for HIV at least once in your life, whether you are at risk for HIV or not.  Cancer screening tests  Cervical cancer screening: If you have a cervix, begin getting regular cervical cancer screening tests starting at age 21.  Breast cancer scan (mammogram): If you've ever had breasts, begin having regular mammograms starting at age 40. This is a scan to check for breast cancer.  Colon cancer screening: It is important to start screening for colon cancer at age 45.  Have a colonoscopy test every 10 years (or more often if you're at risk) Or, ask your provider about stool tests like a FIT test every year or Cologuard test every 3 years.  To learn more about your testing options, visit:   .  For help making a decision, visit:   https://bit.ly/jo37298.  Prostate cancer screening test: If you have a prostate, ask your care team if a prostate cancer screening test (PSA) at age 55 is right for you.  Lung cancer screening: If you are a current or former smoker ages 50 to 80, ask your care team if ongoing lung cancer screenings are right for you.  For informational purposes only. Not to replace the advice of your health care provider. Copyright   2023 [facility] [employer]. All rights reserved. Clinically reviewed by the [facility]itions [facility]. [employer] [id number] - REV 01/24.  Hearing Loss: Care Instructions  Overview     Hearing loss is a sudden or slow decrease in how well you hear. It can range from slight to profound. Permanent hearing loss can occur with aging. It also can  happen when you are exposed long-term to loud noise. Examples include listening to loud music, riding motorcycles, or being around other loud machines.  Hearing loss can affect your work and home life. It can make you feel lonely or depressed. You may feel that you have lost your independence. But hearing aids and other devices can help you hear better and feel connected to others.  Follow-up care is a key part of your treatment and safety. Be sure to make and go to all appointments, and call your doctor if you are having problems. It's also a good idea to know your test results and keep a list of the medicines you take.  How can you care for yourself at home?  Avoid loud noises whenever possible. This helps keep your hearing from getting worse.  Always wear hearing protection around loud noises.  Wear a hearing aid as directed.  A professional can help you pick a hearing aid that will work best for you.  You can also get hearing aids over the counter for mild to moderate hearing loss.  Have hearing tests as your doctor suggests. They can show whether your hearing has changed. Your hearing aid may need to be adjusted.  Use other devices as needed. These may include:  Telephone amplifiers and hearing aids that can connect to a television, stereo, radio, or microphone.  Devices that use lights or vibrations. These alert you to the doorbell, a ringing telephone, or a baby monitor.  Television closed-captioning. This shows the words at the bottom of the screen. Most new TVs can do this.  TTY (text telephone). This lets you type messages back and forth on the telephone instead of talking or listening. These devices are also called TDD. When messages are typed on the keyboard, they are sent over the phone line to a receiving TTY. The message is shown on a monitor.  Use text messaging, social media, and email if it is hard for you to communicate by telephone.  Try to learn a listening technique called speechreading. It is  "not lipreading. You pay attention to people's gestures, expressions, posture, and tone of voice. These clues can help you understand what a person is saying. Face the person you are talking to, and have them face you. Make sure the lighting is good. You need to see the other person's face clearly.  Think about counseling if you need help to adjust to your hearing loss.  When should you call for help?  Watch closely for changes in your health, and be sure to contact your doctor if:    You think your hearing is getting worse.     You have new symptoms, such as dizziness or nausea.   Where can you learn more?  Go to https://www.[employer].net/patiented  Enter R798 in the search box to learn more about \"Hearing Loss: Care Instructions.\"  Current as of: September 27, 2023               Content Version: 14.0    2931-3847 [employer].   Care instructions adapted under license by your healthcare professional. If you have questions about a medical condition or this instruction, always ask your healthcare professional. [employer] disclaims any warranty or liability for your use of this information.      Bladder Training: Care Instructions  Your Care Instructions     Bladder training is used to treat urge incontinence and stress incontinence. Urge incontinence means that the need to urinate comes on so fast that you can't get to a toilet in time. Stress incontinence means that you leak urine because of pressure on your bladder. For example, it may happen when you laugh, cough, or lift something heavy.  Bladder training can increase how long you can wait before you have to urinate. It can also help your bladder hold more urine. And it can give you better control over the urge to urinate.  It is important to remember that bladder training takes a few weeks to a few months to make a difference. You may not see results right away, but don't give up.  Follow-up care is a key part of your treatment and " safety. Be sure to make and go to all appointments, and call your doctor if you are having problems. It's also a good idea to know your test results and keep a list of the medicines you take.  How can you care for yourself at home?  Work with your doctor to come up with a bladder training program that is right for you. You may use one or more of the following methods.  Delayed urination  In the beginning, try to keep from urinating for 5 minutes after you first feel the need to go.  While you wait, take deep, slow breaths to relax. Kegel exercises can also help you delay the need to go to the bathroom.  After some practice, when you can easily wait 5 minutes to urinate, try to wait 10 minutes before you urinate.  Slowly increase the waiting period until you are able to control when you have to urinate.  Scheduled urination  Empty your bladder when you first wake up in the morning.  Schedule times throughout the day when you will urinate.  Start by going to the bathroom every hour, even if you don't need to go.  Slowly increase the time between trips to the bathroom.  When you have found a schedule that works well for you, keep doing it.  If you wake up during the night and have to urinate, do it. Apply your schedule to waking hours only.  Kegel exercises  These tighten and strengthen pelvic muscles, which can help you control the flow of urine. (If doing these exercises causes pain, stop doing them and talk with your doctor.) To do Kegel exercises:  Squeeze your muscles as if you were trying not to pass gas. Or squeeze your muscles as if you were stopping the flow of urine. Your belly, legs, and buttocks shouldn't move.  Hold the squeeze for 3 seconds, then relax for 5 to 10 seconds.  Start with 3 seconds, then add 1 second each week until you are able to squeeze for 10 seconds.  Repeat the exercise 10 times a session. Do 3 to 8 sessions a day.  When should you call for help?  Watch closely for changes in your  "health, and be sure to contact your doctor if:    Your incontinence is getting worse.     You do not get better as expected.   Where can you learn more?  Go to https://www.[employer].net/patiented  Enter V684 in the search box to learn more about \"Bladder Training: Care Instructions.\"  Current as of: November 15, 2023               Content Version: 14.0    8418-0893 [employer].   Care instructions adapted under license by your healthcare professional. If you have questions about a medical condition or this instruction, always ask your healthcare professional. [employer] disclaims any warranty or liability for your use of this information.         "

## 2024-09-24 ENCOUNTER — TELEPHONE (OUTPATIENT)
Dept: FAMILY MEDICINE | Facility: CLINIC | Age: 84
End: 2024-09-24

## 2024-09-24 ENCOUNTER — LAB (OUTPATIENT)
Dept: LAB | Facility: CLINIC | Age: 84
End: 2024-09-24
Payer: COMMERCIAL

## 2024-09-24 DIAGNOSIS — N18.31 CHRONIC KIDNEY DISEASE, STAGE 3A (H): ICD-10-CM

## 2024-09-24 LAB
CREAT UR-MCNC: 67.4 MG/DL
MICROALBUMIN UR-MCNC: 55.2 MG/L
MICROALBUMIN/CREAT UR: 81.9 MG/G CR (ref 0–25)

## 2024-09-24 PROCEDURE — 82043 UR ALBUMIN QUANTITATIVE: CPT

## 2024-09-24 PROCEDURE — 82570 ASSAY OF URINE CREATININE: CPT

## 2024-09-24 NOTE — TELEPHONE ENCOUNTER
Incoming call from patient stating that her EUS surgery scheduled for 11/8 has been canceled, and she wants to know the reason. She is requesting a call back from Dr. López.

## 2024-09-24 NOTE — TELEPHONE ENCOUNTER
EUS was scheduled at [facility] but with a [facility] provider.     Spoke with [facility] to get clarification. [facility] Coordinator clarified that EUS procedure was cancelled to get patient scheduled for earlier consult per their physician's (Dr. Navarro) recommendation.    Spoke with Kalpana. She confirmed that she received a call from [facility] shortly after initial message. She has been rescheduled from EUS to urgent GI consult at [facility] with Dr. Navarro. Patient reports she is comfortable with this plan.

## 2024-09-24 NOTE — TELEPHONE ENCOUNTER
Left message to call back for: Kalpana  Information to relay to patient: please get more information from Kalpana regarding the message below.

## 2024-09-26 ENCOUNTER — TELEPHONE (OUTPATIENT)
Dept: FAMILY MEDICINE | Facility: CLINIC | Age: 84
End: 2024-09-26
Payer: COMMERCIAL

## 2024-09-26 NOTE — TELEPHONE ENCOUNTER
Spoke with patient. See message from 9/24/24 telephone encounter. Patient will see [facility] for consult 10/25. Patient will keep pre-op with Dr. López in case it is needed depending on [facility] recommendations. Patient has no additional questions.

## 2024-10-02 ENCOUNTER — TELEPHONE (OUTPATIENT)
Dept: FAMILY MEDICINE | Facility: CLINIC | Age: 84
End: 2024-10-02
Payer: COMMERCIAL

## 2024-10-02 NOTE — TELEPHONE ENCOUNTER
Spoke with patient. She wanted to confirm that we knew she had an appointment with [facility] for a provider consult. Patient will be calling them to see if they have a waitlist for a sooner consultation.

## 2024-10-02 NOTE — TELEPHONE ENCOUNTER
General Call      Reason for Call:  patient called and has a procedure ordered by Maribel Haider at [facility]  on October 25.    Patient has questions with Maribel Haider about    Chris Torres  November 8 was schedule for EUS   Endoscopic Ultrasound.    Procedure to be determined.    Patient doesn't know much more and wants to speak with the TC.    Please contact patient.  Thank you.    What are your questions or concerns:  yes    Date of last appointment with provider: na    Could we [facility] this [facility] or would you prefer to receive a phone call?:   Patient would prefer a phone call   Okay to leave a detailed message?: Yes at Home number on file [phone] (home)

## 2024-10-04 ENCOUNTER — PATIENT OUTREACH (OUTPATIENT)
Dept: CARE COORDINATION | Facility: CLINIC | Age: 84
End: 2024-10-04
Payer: COMMERCIAL

## 2024-10-04 DIAGNOSIS — N39.0 COMPLICATED URINARY TRACT INFECTION: Primary | ICD-10-CM

## 2024-10-04 NOTE — PROGRESS NOTES
Clinical Product Navigator RN reviewed chart; patient on payer product coverage.  Review results:   CPN Initial Information Gathering  Referral Source: Health Plan    Met referral criteria for Care Coordinator; : [facility] Medicare Advantage   Patient identified by Health Plan as a potential candidate for Primary Care Coordination due multiple hospital admission in July/August and September.     Referral placed    HOSSEIN Nolasco   Social Work Clinic Care Coordinator   [facility]  PH: [phone]  [email or web address]

## 2024-10-08 ENCOUNTER — PATIENT OUTREACH (OUTPATIENT)
Dept: CARE COORDINATION | Facility: CLINIC | Age: 84
End: 2024-10-08
Payer: COMMERCIAL

## 2024-10-08 NOTE — LETTER
[facility] CARE COORDINATION  [address]  [place] [place] [facility] [address]    October 9, 2024    Kalpana Manzo  [address] [facility] [address]      Dear Kalpana,    I am a clinic community health worker who works with TANIKA YU MD with the [facility]. I have been trying to reach you recently to introduce Clinic Care Coordination. Below is a description of clinic care coordination and how we can further assist you.       The clinic care coordination team is made up of a registered nurse, , financial resource worker and community health worker who understand the health care system. The goal of clinic care coordination is to help you manage your health and improve access to the health care system. Our team works alongside your provider to assist you in determining your health and social needs. We can help you obtain health care and community resources, providing you with necessary information and education. We can work with you through any barriers and develop a care plan that helps coordinate and strengthen the communication between you and your care team.  Our services are voluntary and are offered without charge to you personally.    Please feel free to contact Ksenia at [phone] with any questions or concerns regarding care coordination and what we can offer.      We are focused on providing you with the highest-quality healthcare experience possible.    Sincerely,     JATIN Stallings  Connected Care Resource Center  [facility]

## 2024-10-08 NOTE — PROGRESS NOTES
Clinic Care Coordination Contact  [facility]/Voicemail    Clinical Data: Care Coordinator Outreach    Outreach Documentation Number of Outreach Attempt   10/8/2024  11:25 AM 1       Left message on patient's voicemail with call back information and requested return call.    Plan: Care Coordinator will try to reach patient again in 1-2 business days.    JATIN Stallings  [phone]  [facility]

## 2024-10-09 NOTE — PROGRESS NOTES
Clinic Care Coordination Contact  [facility]/Voicemail    Clinical Data: Care Coordinator Outreach    Outreach Documentation Number of Outreach Attempt   10/8/2024  11:25 AM 1   10/9/2024  10:48 AM 2       Left message on patient's voicemail with call back information and requested return call.    Plan: Care Coordinator will send care coordination introduction letter with care coordinator contact information and explanation of care coordination services via [employer]. Care Coordinator will do no further outreaches at this time.    TAY StallingsW  [phone]  [facility] Care Resource [facility]

## 2024-10-14 ENCOUNTER — TELEPHONE (OUTPATIENT)
Dept: UROLOGY | Facility: CLINIC | Age: 84
End: 2024-10-14
Payer: COMMERCIAL

## 2024-10-14 NOTE — TELEPHONE ENCOUNTER
Message left for patient to call back to the nurse regarding setting up KUB and to move her appointment with Dr Paniagua to Silvia Shaikh NP.  Yousuf message also sent. Sunshine Sears RN

## 2024-10-14 NOTE — TELEPHONE ENCOUNTER
[facility]    Phone Message    May a detailed message be left on voicemail: yes     Reason for Call: Other: Pt returning to call to schedule      Action Taken: Other: uro     Travel Screening: Not Applicable     Date of Service:

## 2024-10-16 ENCOUNTER — PATIENT OUTREACH (OUTPATIENT)
Dept: CARE COORDINATION | Facility: CLINIC | Age: 84
End: 2024-10-16
Payer: COMMERCIAL

## 2024-10-16 NOTE — PROGRESS NOTES
Clinic Care Coordination Contact  Community Health Worker Initial Outreach    CHW Initial Information Gathering:  Referral Source: PCP  Current living arrangement:: I live in a private home with spouse  Informal Support system:: Spouse  No PCP office visit in Past Year: No (9/23/24 with Dr. López)  CHW Additional Questions  Paramjit active?: Yes  Patient sent Social Determinants of Health questionnaire?: Yes    Patient accepts CC: Yes. Patient scheduled for assessment with WONG Burnham on 10/18/24 at 1:00. Patient noted desire to discuss:    - Support with recent medical conditions    - Support with making and understanding appointments    ** patient let CHW know that she has had a lot going on medically lately. Patient has had 2-3 rib fractures, kidney stones and was told she has a lesion on her Pancreas. Patient had a low BP reading and her  has been checking her BP most days and this has improved. Patient feels like she may have a UTI and is comfortable waiting to meet with Dr. López on 10/28/24.     Patient has a [facility] appointment on 10/25/24 that she plans on attending but is wondering why she can't see a GI doctor with [facility].     Patient is feeling very overwhelmed with her medical situations.     ** CHW sent Care Coordination Questionnaires to patient through [employer].     Ksenia Herbert  [facility] Health Worker  [facility] Care Coordination   [place], [place], [place], [place] and [facility]  Office: [phone]

## 2024-10-17 ENCOUNTER — PATIENT OUTREACH (OUTPATIENT)
Dept: CARE COORDINATION | Facility: CLINIC | Age: 84
End: 2024-10-17
Payer: COMMERCIAL

## 2024-10-18 ENCOUNTER — HOSPITAL ENCOUNTER (EMERGENCY)
Facility: CLINIC | Age: 84
Discharge: HOME | End: 2024-10-18
Attending: EMERGENCY MEDICINE | Admitting: EMERGENCY MEDICINE
Payer: COMMERCIAL

## 2024-10-18 ENCOUNTER — PATIENT OUTREACH (OUTPATIENT)
Dept: NURSING | Facility: CLINIC | Age: 84
End: 2024-10-18
Payer: COMMERCIAL

## 2024-10-18 VITALS
BODY MASS INDEX: 22.5 KG/M2 | SYSTOLIC BLOOD PRESSURE: 163 MMHG | DIASTOLIC BLOOD PRESSURE: 74 MMHG | HEIGHT: 66 IN | TEMPERATURE: 97.5 F | OXYGEN SATURATION: 97 % | WEIGHT: 140 LBS | HEART RATE: 64 BPM | RESPIRATION RATE: 20 BRPM

## 2024-10-18 DIAGNOSIS — R31.0 GROSS HEMATURIA: ICD-10-CM

## 2024-10-18 LAB
ALBUMIN UR-MCNC: 30 MG/DL
APPEARANCE UR: ABNORMAL
BACTERIA #/AREA URNS HPF: ABNORMAL /HPF
BILIRUB UR QL STRIP: NEGATIVE
COLOR UR AUTO: YELLOW
GLUCOSE UR STRIP-MCNC: NEGATIVE MG/DL
HGB UR QL STRIP: ABNORMAL
KETONES UR STRIP-MCNC: NEGATIVE MG/DL
LEUKOCYTE ESTERASE UR QL STRIP: ABNORMAL
MUCOUS THREADS #/AREA URNS LPF: PRESENT /LPF
NITRATE UR QL: NEGATIVE
PH UR STRIP: 7 [PH] (ref 5–7)
RBC URINE: >182 /HPF
SP GR UR STRIP: 1.01 (ref 1–1.03)
SQUAMOUS EPITHELIAL: 3 /HPF
TRANSITIONAL EPI: 1 /HPF
UROBILINOGEN UR STRIP-MCNC: <2 MG/DL
WBC URINE: 57 /HPF

## 2024-10-18 PROCEDURE — 81003 URINALYSIS AUTO W/O SCOPE: CPT | Performed by: EMERGENCY MEDICINE

## 2024-10-18 PROCEDURE — 87086 URINE CULTURE/COLONY COUNT: CPT | Performed by: EMERGENCY MEDICINE

## 2024-10-18 PROCEDURE — 99283 EMERGENCY DEPT VISIT LOW MDM: CPT

## 2024-10-18 ASSESSMENT — ACTIVITIES OF DAILY LIVING (ADL)
ADLS_ACUITY_SCORE: 36
DEPENDENT_IADLS:: INDEPENDENT
ADLS_ACUITY_SCORE: 36

## 2024-10-18 NOTE — PROGRESS NOTES
Clinic Care Coordination Contact  Clinic Care Coordination Contact  OUTREACH    Referral Information:  Referral Source: Health Plan    Primary Diagnosis: Genitourinary Disorders    Chief Complaint   Patient presents with    Clinic Care Coordination - Initial        Universal Utilization: see below  Clinic Utilization  Difficulty keeping appointments:: No  Compliance Concerns: No  No-Show Concerns: No  No PCP office visit in Past Year: No  Utilization      No Show Count (past year)  0             ED Visits  4             Hospital Admissions  1                    Current as of: 10/18/2024  2:32 PM                Clinical Concerns:  Current Medical Concerns:    Patient Active Problem List   Diagnosis    Basal Cell Carcinoma Of The Skin    Hyperlipidemia    Onychomycosis    Osteopenia    Osteoarthritis Of The Knee    Eczema    Limb Swelling    Prediabetes    Varicose vein of leg    Midline cystocele    Seasonal allergic rhinitis    Overactive bladder    Hoarseness    Chronic kidney disease, stage 3a (H)    Chronic deep vein thrombosis (DVT) of femoral vein of left lower extremity (H)    Atrial thrombus    Abnormal stress test    Nephrolithiasis    Pancreatic mass    Complicated UTI (urinary tract infection)    Hematuria, unspecified type         Current Behavioral Concerns:     Education Provided to patient: yes   Pain  Pain (GOAL):: No  Health Maintenance Reviewed: Up to date  Clinical Pathway: None    Medication Management:  Medication review status: Medications reviewed and no changes reported per patient.        Patient manages her medications independently without difficulty     Functional Status:  Dependent ADLs:: Independent  Dependent IADLs:: Independent  Bed or wheelchair confined:: No  Mobility Status: Independent  Fallen 2 or more times in the past year?: No  Any fall with injury in the past year?: No    Living Situation:  Current living arrangement:: I live in a private home with spouse    Lifestyle &  Psychosocial Needs:    Social Determinants of Health     Food Insecurity: Low Risk  (9/23/2024)    Food Insecurity     Within the past 12 months, did you worry that your food would run out before you got money to buy more?: No     Within the past 12 months, did the food you bought just not last and you didn t have money to get more?: No   Depression: Not at risk (9/23/2024)    PHQ-2     PHQ-2 Score: 0   Housing Stability: Low Risk  (9/23/2024)    Housing Stability     Do you have housing? : Yes     Are you worried about losing your housing?: No   Tobacco Use: Low Risk  (9/23/2024)    Patient History     Smoking Tobacco Use: Never     Smokeless Tobacco Use: Never     Passive Exposure: Not on file   Financial Resource Strain: Low Risk  (9/23/2024)    Financial Resource Strain     Within the past 12 months, have you or your family members you live with been unable to get utilities (heat, electricity) when it was really needed?: No   Alcohol Use: Not on file   Transportation Needs: Low Risk  (9/23/2024)    Transportation Needs     Within the past 12 months, has lack of transportation kept you from medical appointments, getting your medicines, non-medical meetings or appointments, work, or from getting things that you need?: No   Physical Activity: Unknown (9/23/2024)    Exercise Vital Sign     Days of Exercise per Week: 2 days     Minutes of Exercise per Session: Not on file   Interpersonal Safety: Low Risk  (9/23/2024)    Interpersonal Safety     Do you feel physically and emotionally safe where you currently live?: Yes     Within the past 12 months, have you been hit, slapped, kicked or otherwise physically hurt by someone?: No     Within the past 12 months, have you been humiliated or emotionally abused in other ways by your partner or ex-partner?: No   Stress: No Stress Concern Present (9/23/2024)    Citizen of [place] of Occupational Health - Occupational Stress Questionnaire     Feeling of Stress : Only a little  "  Social Connections: Unknown (9/23/2024)    Social Connection and Isolation Panel [NHANES]     Frequency of Communication with Friends and Family: Not on file     Frequency of Social Gatherings with Friends and Family: Twice a week     Attends Latter-day Services: Not on file     Active Member of Clubs or Organizations: Not on file     Attends Club or Organization Meetings: Not on file     Marital Status: Not on file   Health Literacy: Not on file     Diet:: Regular  Inadequate nutrition (GOAL):: No  Tube Feeding: No  Inadequate activity/exercise (GOAL):: No  Significant changes in sleep pattern (GOAL): No  Transportation means:: Regular car     Latter-day or spiritual beliefs that impact treatment:: No  Mental health DX:: No  Mental health management concern (GOAL):: No  Chemical Dependency Status: No Current Concerns  Informal Support system:: Spouse        84 yr F PMH: as listed above.  Referral from CPN due to high utilization.  4 ED visit's in the past year.  Spent duration of time today reviewing all upcoming appointments and the rationale for each appointment.  Patient had each appointment listed already in her calendar.  Notified patient that [facility] is unable to see scheduling items for [facility] however we do get their records afterwards.  Patient stated an understanding.  She denied any pain from previous rib fx's.  Discussed ED visit this morning.  She stated she was no longer having blood in her urine but had intermittently had it x 2 days.  Writer discussed reason to return to the ED as also stated on her AVS.  Reviewed with patient-\"If you develop flank pain, fever and chills, painful or frequent urination,  abdominal pain, or any new symptoms, please return to the Emergency Department for evaluation\"  Additionally added if she had any dizziness, feelings of lightheadedness or severe fatigue.  She stated an understanding.  CCC RN will outreach to patient next week to check on status of Urology " appointment.    Resources and Interventions:  Current Resources:      Community Resources: None  Supplies Currently Used at Home: Hearing Aid Batteries  Equipment Currently Used at Home: none (owns a cane)  Employment Status: retired         Advance Care Plan/Directive  Advanced Care Plans/Directives on file:: Yes  Status of record:: On File and Validated  Type Advanced Care Plans/Directives: Advanced Directive - On File    Referrals Placed: None         Care Plan:  Care Plan: Appointments/Referrals       Problem: HP GENERAL PROBLEM       Goal: I would like to attend the following appointments.       Start Date: 10/18/2024    Note:     Barriers: numerous appointments  Strengths: motivated  Patient expressed understanding of goal: yes  Action steps to achieve this goal:    1. Urology-waiting to hear from them to get into see them sooner  2. 10/28 @ 2:00  Dr. López & 11/6 @ 10:40 Dr. López  3. 10/23 @ 8:05 Dr. Fernández-Cardiology   4. 10/25 [facility] for pancreas                                  Patient/Caregiver understanding: Patient stated an understanding    Outreach Frequency: monthly, more frequently as needed  Future Appointments                In 5 days Elias Fernández MD [facility], [facility]    In 1 week Mela López MD [facility], [facility]    In 2 weeks Mela López MD [facility], [facility]    In 1 month [facility] XR 1 [facility], [facility]    In 1 month Silvia Shaikh, APRN CNP [facility] [place], [facility]            Plan: CCC RN will outreach to patient in 1-2 weeks

## 2024-10-18 NOTE — ED PROVIDER NOTES
Emergency Department Midlevel Supervisory Note     I had a face to face encounter with this patient seen by the Advanced Practice Provider (LIZZY). I personally made/approved the management plan and take responsibility for the patient management. I personally saw patient and performed a substantive portion of the visit including all aspects of the medical decision making.     ED Course:  7:32 AM Berkley Wood PA-C staffed patient with me. I agree with their assessment and plan of management, and I will see the patient.  7:37 AM Attempted to meet the patient, but patient was in the bathroom.  7:42 AM Attempted to meet the patient, but patient was still in the bathroom.  7:52 AM I met with the patient to introduce myself, gather additional history, perform my initial exam, and discuss the plan.   8:33 AM UA appears unchanged from most recent, which did not grow bacteria.  No indication for empiric treatment with lack of symptoms.  Pt referred to urology for outpatient cystoscopy, further eval for painless hematuria.  Instructed on return precautions. No real indication for repeat emergent CT abd/pelvis or serum labs based on exam/histoyr.    Brief HPI:     Kalpana Manzo is a 84 year old female who presents for evaluation of hematuria.    Patient reports that for the past couple of months she has been having occasional episodes of hematuria. She states that for the past couple of weeks she has been having her episodes of hematuria specifically at night only. She notes that throughout the day her urine is normal, but then at night her urine becomes an sonal brown reddish color. She endorses a PMH of UTIs and nephrolithiasis. She also endorses regularly taking a blood thinner due to having a chronic DVT. She endorses occasional mild dysuria, but she denies any recent urinary frequency. She also denies any recent fever, chills, abdominal pain, or any other complications at this time.     I, Jerrod Martinez, am serving as  "a scribe to document services personally performed by Constantin Garner DO, based on my observations and the provider's statements to me.   I, Constantin Garner DO, attest that Jerrod Martinez was acting in a scribe capacity, has observed my performance of the services and has documented them in accordance with my direction.    Brief Physical Exam: BP (!) 163/84   Pulse 84   Temp 97.5  F (36.4  C) (Oral)   Resp 20   Ht 1.676 m (5' 6\")   Wt 63.5 kg (140 lb)   SpO2 97%   BMI 22.60 kg/m    Physical Exam  Vitals and nursing note reviewed.   Constitutional:       General: She is not in acute distress.     Appearance: Normal appearance.   HENT:      Head: Normocephalic and atraumatic.      Nose: Nose normal.      Mouth/Throat:      Mouth: Mucous membranes are moist.   Eyes:      Pupils: Pupils are equal, round, and reactive to light.   Cardiovascular:      Rate and Rhythm: Normal rate and regular rhythm.      Pulses: Normal pulses.           Radial pulses are 2+ on the right side and 2+ on the left side.        Dorsalis pedis pulses are 2+ on the right side and 2+ on the left side.   Pulmonary:      Effort: Pulmonary effort is normal. No respiratory distress.      Breath sounds: Normal breath sounds.   Abdominal:      Palpations: Abdomen is soft.      Tenderness: There is no abdominal tenderness. There is no right CVA tenderness or left CVA tenderness. Negative signs include McBurney's sign.   Musculoskeletal:      Cervical back: Full passive range of motion without pain and neck supple.      Comments: No calf tenderness or swelling b/l   Skin:     General: Skin is warm.      Findings: No rash.   Neurological:      General: No focal deficit present.      Mental Status: She is alert. Mental status is at baseline.      Comments: Fluent speech, no acute lateralizing deficits   Psychiatric:         Mood and Affect: Mood normal.         Behavior: Behavior normal.          MDM:  Pt seen in conjunction with LIZZY Gooden " Isabel.  Pt here with intermittent hematuria, primarily at night.  Denies any dysuria or adominal/flank pain, afebrile without vomiting or bowel changes.  Pt has been seen before with CT imaging in September and August showing intrarenal stones, but nothing obstructing. She had virtual urology visits to discuss renal stones.  She has never had cystoscopy or further evaluation of this hematuria.  Given lack of pain, benign exam, do not feel repeat CT abd indicated. Will check UA, plan for urology referral (pt doesn't have follow up at this point) for outpatient follow up regarding this painless hematuria.         1. Gross hematuria          Labs and Imaging:  Results for orders placed or performed during the hospital encounter of 10/18/24   UA with Microscopic reflex to Culture    Specimen: Urine, Clean Catch   Result Value Ref Range    Color Urine Yellow Colorless, Straw, Light Yellow, Yellow    Appearance Urine Turbid (A) Clear    Glucose Urine Negative Negative mg/dL    Bilirubin Urine Negative Negative    Ketones Urine Negative Negative mg/dL    Specific Gravity Urine 1.011 1.001 - 1.030    Blood Urine >1.0 mg/dL (A) Negative    pH Urine 7.0 5.0 - 7.0    Protein Albumin Urine 30 (A) Negative mg/dL    Urobilinogen Urine <2.0 <2.0 mg/dL    Nitrite Urine Negative Negative    Leukocyte Esterase Urine 75 Ramiro/uL (A) Negative    Bacteria Urine Few (A) None Seen /HPF    Mucus Urine Present (A) None Seen /LPF    RBC Urine >182 (H) <=2 /HPF    WBC Urine 57 (H) <=5 /HPF    Squamous Epithelials Urine 3 (H) <=1 /HPF    Transitional Epithelials Urine 1 <=1 /HPF       I have reviewed the relevant laboratory studies above.    I independently interpreted the following imaging study(s):   none    EKG:   None.    Procedures:  I was present for the key portions of procedures documented in LIZZY/midlevel note, see midlevel note for further details.    Constantin Garner DO  [facility] EMERGENCY ROOM  [address]  [facility] [id number]  [phone]       Constantin Garner MD  10/18/24 0806

## 2024-10-18 NOTE — ED PROVIDER NOTES
Emergency Department Encounter   NAME: Kalpana Manzo ; AGE: 84 year old female ; YOB: 1940 ; MRN: [id number] ; PCP: Mela López   ED PROVIDER: Berkley Hall PA-C    Evaluation Date & Time:   10/18/2024  7:12 AM    CHIEF COMPLAINT:  Hematuria      Impression and Plan   MDM: Kalpana Manzo is a 84 year old female with a pertinent history of overactive bladder, HLD, chronic DVT, nephrolithiasis, UTIs, hematuria who presents to the ED for evaluation of hematuria x1-2 weeks.  Patient states during the day her urine is clear/yellow but throughout the night she gets up to use the bathroom it is dark red.  This has been occurring on and off for the last 2 weeks, she brought samples today.  She denies fever, chills, nausea/vomiting, flank pain, dysuria, frequency.  Denies blood in her stool.  She is anticoagulated on Eliquis.    Per chart review, patient was admitted to the ICU 8/22 due to urosepsis from E. coli bacteremia and associated nephrolithiasis without hydronephrosis.  Patient was seen on 9/13 in the ED for hematuria without any symptoms and at that time her labs and UA were normal.  CT scan at this time showed an unchanged nonobstructing stone in the lower left kidney.  She has been evaluated by urology after her last ED visit to discuss a definitive stone procedure, next appointment 11/27.    Plan to order UA and that this guide imaging decision.  I independently reviewed and interpreted the UA which is not infected- patient's UA from 9/13 is remarkably similar to today, patient has no symptoms of infection either.     Patient reports no symptoms other than the painless hematuria.  While there has been evidence of kidney stones on previous imaging, her last two scans have shown no obstruction due to the stones. Considered an abd CT scan however at this point since the patient is experiencing no other symptoms I do not feel this is necessary. Placed an urology consult for further evaluation  of her painless hematuria.     Discussed lab results.  Return precautions to the ED were discussed, patient and  verbalized understanding and were agreeable with the plan. All questions were answered.     Medical Decision Making  Obtained supplemental history:Supplemental history obtained?: Family Member/Significant Other  Reviewed external records: External records reviewed?: Inpatient Record:  Admit ICU, Outpatient Record:  ED visit,  urology, 10/14 phone urology, Prior Labs:  UA and culture,  UA and culture,  CBC/BMP, and Prior Imagin/22 and  CT abd  Care impacted by chronic illness:Chronic Kidney Disease, Hyperlipidemia, and Other: chronic hematuria  Did you consider but not order tests?: Work up considered but not performed and documented in chart, if applicable  Did you interpret images independently?: Independent interpretation of ECG and images noted in documentation, when applicable.  Consultation discussion with other provider:Did you involve another provider (consultant, MH, pharmacy, etc.)?: No  Discharge. No recommendations on prescription strength medication(s). See documentation for any additional details.    MIPS:  Not Applicable    ED COURSE:  7:25 AM I met and introduced myself to the patient. I gathered initial history and performed my physical exam. We discussed plan for initial workup.   7:35 AM I have staffed the patient with Dr. Garner, ED MD, who has evaluated the patient and agrees with all aspects of today's care.   8:29 AM I rechecked the patient and discussed results, discharge, follow up, and reasons to return to the ED.       FINAL IMPRESSION:    ICD-10-CM    1. Gross hematuria  R31.0 Adult Urology  Referral            MEDICATIONS GIVEN IN THE EMERGENCY DEPARTMENT:  Medications - No data to display      NEW PRESCRIPTIONS STARTED AT TODAY'S ED VISIT:  New Prescriptions    No medications on file         HPI   Use of Intrepreter: N/A     Kalpana  ALOK Manzo is a 84 year old female with a pertinent history of HLD, chronic DVT, UTI, nephrolithiasis, overactive bladder who presents to the ED by walk in with  for evaluation of hematuria.  Patient states in the last 1 to 2 weeks she has been having multiple episodes of painless hematuria at night.  Throughout the day her urine is clear/yellow but when she wakes up in the middle of the night to the bathroom she notices it is bright red.  She brought samples with her today.  She denies fever, chills, nausea/vomiting, abdominal pain, flank pain, dysuria.    She is anticoagulated on Eliquis.     REVIEW OF SYSTEMS:  Pertinent positive and negative symptoms per HPI.       Medical History     Past Medical History:   Diagnosis Date    Basal cell carcinoma of skin     Contact dermatitis and other eczema, due to unspecified cause     Dermatophytosis of nail     Disorder of bone and cartilage     Hepatitis     Hyperlipidemia     Nocturia     Osteoarthrosis involving lower leg     Prediabetes 7/18/2016    Swelling of limb        Past Surgical History:   Procedure Laterality Date    BIOPSY BREAST Left 1987    BIOPSY BREAST Right 1992    BIOPSY OF BREAST, INCISIONAL      Description: Biopsy Breast Open;  Recorded: 05/19/2008;    HC REMOVE TONSILS/ADENOIDS,<13 Y/O      Description: Tonsillectomy With Adenoidectomy;  Recorded: 05/19/2008;    REVISE SECONDARY VARICOSITY      Description: Varicose Vein Ligation;  Recorded: 05/19/2008;    TOTAL KNEE ARTHROPLASTY Right 08/2008    [facility] APPENDECTOMY      Description: Appendectomy;  Recorded: 05/19/2008;       Family History   Problem Relation Age of Onset    Varicose Veins Mother        Social History     Tobacco Use    Smoking status: Never    Smokeless tobacco: Never   Vaping Use    Vaping status: Never Used   Substance Use Topics    Alcohol use: Yes     Comment: Alcoholic Drinks/day: rare    Drug use: No       acetaminophen (TYLENOL) 500 MG tablet  apixaban ANTICOAGULANT  "(ELIQUIS) 5 MG tablet  cholecalciferol, vitamin D3, (VITAMIN D3) 2,000 unit Tab  GLUCOSAM HCL/CHONDRO MONTES A/C/MN (GLUCOSAMINE-CHONDROITIN COMPLX ORAL)  multivitamin capsule  simvastatin (ZOCOR) 10 MG tablet          Physical Exam     First Vitals:  Patient Vitals for the past 24 hrs:   BP Temp Temp src Pulse Resp SpO2 Height Weight   10/18/24 0708 (!) 163/84 97.5  F (36.4  C) Oral 84 20 97 % 1.676 m (5' 6\") 63.5 kg (140 lb)         PHYSICAL EXAM:   Physical Exam  Constitutional:       General: She is not in acute distress.     Appearance: She is well-developed. She is not ill-appearing.   HENT:      Head: Normocephalic.      Nose: Nose normal. No congestion.      Mouth/Throat:      Mouth: Mucous membranes are moist.   Eyes:      Conjunctiva/sclera: Conjunctivae normal.   Cardiovascular:      Rate and Rhythm: Normal rate and regular rhythm.      Heart sounds: Normal heart sounds.   Pulmonary:      Effort: Pulmonary effort is normal.      Breath sounds: Normal breath sounds.   Abdominal:      General: Abdomen is flat.      Palpations: There is no mass.      Tenderness: There is no abdominal tenderness. There is no right CVA tenderness, left CVA tenderness or guarding.      Comments: No suprapubic tenderness.    Musculoskeletal:         General: No swelling.      Cervical back: Neck supple.   Skin:     General: Skin is warm and dry.      Capillary Refill: Capillary refill takes less than 2 seconds.   Neurological:      General: No focal deficit present.      Mental Status: She is alert and oriented to person, place, and time.   Psychiatric:         Mood and Affect: Mood normal.         Behavior: Behavior normal.             Results     LAB:  All pertinent labs reviewed and interpreted  Labs Ordered and Resulted from Time of ED Arrival to Time of ED Departure   ROUTINE UA WITH MICROSCOPIC REFLEX TO CULTURE - Abnormal       Result Value    Color Urine Yellow      Appearance Urine Turbid (*)     Glucose Urine Negative      " Bilirubin Urine Negative      Ketones Urine Negative      Specific Gravity Urine 1.011      Blood Urine >1.0 mg/dL (*)     pH Urine 7.0      Protein Albumin Urine 30 (*)     Urobilinogen Urine <2.0      Nitrite Urine Negative      Leukocyte Esterase Urine 75 Ramiro/uL (*)     Bacteria Urine Few (*)     Mucus Urine Present (*)     RBC Urine >182 (*)     WBC Urine 57 (*)     Squamous Epithelials Urine 3 (*)     Transitional Epithelials Urine 1     URINE CULTURE       RADIOLOGY:  No orders to display           Berkley Hall PA-C   Emergency Medicine   [facility] EMERGENCY ROOM        Berkley Hall PA-C  10/18/24 0877

## 2024-10-18 NOTE — LETTER
[facility] CARE COORDINATION  [address]  [facility] [address]    October 22, 2024    Kalpana Manzo  [address] [facility] [address]      Dear Kalpana,    I am a clinic care coordinator who works with TANIKA YU MD with the [facility]. I wanted to thank you for spending the time to talk with me.  Below is a description of clinic care coordination and how I can further assist you.       The clinic care coordination team is made up of a registered nurse, , financial resource worker and community health worker who understand the health care system. The goal of clinic care coordination is to help you manage your health and improve access to the health care system. Our team works alongside your provider to assist you in determining your health and social needs. We can help you obtain health care and community resources, providing you with necessary information and education. We can work with you through any barriers and develop a care plan that helps coordinate and strengthen the communication between you and your care team.  Our services are voluntary and are offered without charge to you personally.    Please feel free to contact me with any questions or concerns regarding care coordination and what we can offer.      We are focused on providing you with the highest-quality healthcare experience possible.    Sincerely,     Chacha Toney RN  Clinic Care Coordination  [phone]      Enclosed: I have enclosed a copy of the Patient Centered Plan of Care. This has helpful information and goals that we have talked about. Please keep this in an easy to access place to use as needed.

## 2024-10-18 NOTE — ED TRIAGE NOTES
Blood in urine at night for the past few nights but states during the day its fine. Denies pain. Occurred about one month ago as well. Denies fevers at home.      Triage Assessment (Adult)       Row Name 10/18/24 0708          Triage Assessment    Airway WDL WDL        Respiratory WDL    Respiratory WDL WDL        Skin Circulation/Temperature WDL    Skin Circulation/Temperature WDL WDL        Cardiac WDL    Cardiac WDL WDL        Peripheral/Neurovascular WDL    Peripheral Neurovascular WDL WDL        Cognitive/Neuro/Behavioral WDL    Cognitive/Neuro/Behavioral WDL WDL

## 2024-10-18 NOTE — LETTER
[facility]  Patient Centered Plan of Care  About Me:        Patient Name:  Kalpana Reece    YOB: 1940  Age:         84 year old   Jigar MRN:    [id number] Telephone Information:  Home Phone [phone]   Mobile [phone]       Address:  [address] [facility] [address] [address] Email address:  [email or web address]      Emergency Contact(s)    Name Relationship Lgl Grd Work Phone Home Phone Mobile Phone   1. HAWK REECE Spouse No   [phone]   2. ANNALISE DANIEL Daughter No   [phone]           Primary language:  English     needed? No   [employer]:  [phone] op. 1  Other communication barriers:None    Preferred Method of Communication:     Current living arrangement: I live in a private home with spouse    Mobility Status/ Medical Equipment: Independent        Health Maintenance  Health Maintenance Reviewed: Up to date      My Access Plan  Medical Emergency 911   Primary Clinic Line [facility] [phone]   24 Hour Appointment Line [phone] or  [phone]) (toll-free)   24 Hour Nurse Line [phone] (toll-free)   Preferred Urgent Care No data recorded   Preferred Hospital No data recorded   Preferred Pharmacy [facility] [address] [place] [address]     Behavioral Health Crisis Line The National Suicide Prevention Lifeline at 1-405.259.4616 or Text/Call 988           My Care Team Members  Patient Care Team         Relationship Specialty Notifications Start End    Mela López MD PCP - General  All results, Admissions 5/14/07     Phone: [phone] Fax: [phone]         [address] [facility] [address]    Mela López MD Assigned PCP   6/16/21     Phone: [phone] Fax: [phone]         [address] [facility] [address]    Elias Fernández MD Assigned Heart and Vascular Provider   6/23/24     Phone: [phone] Fax: [phone]          [address] [facility] [address] [facility] [address]    Silvia Shaikh APRN CNP Nurse Practitioner Urology  7/9/24     Phone: [phone] Fax: [phone]         [address] [facility], [facility] [address] [place] [address]    Elias Fernández MD MD Cardiology  7/11/24     Phone: [phone] Fax: [phone]         [address] [facility] [address] [facility] [address]    Silvia Shaikh APRN CNP Assigned Surgical Provider   8/23/24     Phone: [phone] Fax: [phone]         [address] [place] [address], [facility] [address] [place] [address]    Dangelo Juares MD MD Urology  8/28/24     Phone: [phone] Fax: [phone]         [address] [facility] [address]    Sridhar Paniagua MD Physician Urology Admissions 8/28/24     Phone: [phone] Fax: [phone]         [address] [facility] [address]    Jerrod Rodríguez MD Hospitalist HOSPITALIST  8/28/24     Referring to URO    Phone: [phone] Fax: [phone]         [address] [facility] [address]    Chacha Toney, WONG Lead Care Coordinator Primary Care - CC Admissions 10/16/24     Phone: [phone]         Ksenia Herbert [facility] Community Health Worker  Admissions 10/18/24     Phone: [phone]                     My Care Plans  Self Management and Treatment Plan    Care Plan  Care Plan: Appointments/Referrals       Problem: HP GENERAL PROBLEM       Goal: I would like to attend the following appointments.       Start Date: 10/18/2024    Note:     Barriers: numerous appointments  Strengths: motivated  Patient expressed understanding of goal: yes  Action steps to achieve this goal:    1. Urology-waiting to hear from them to get into see them sooner  2. 10/28 @ 2:00  Dr. López & 11/6 @ 10:40 Dr. López  3. 10/23 @ 8:05 Dr. Fernández-Cardiology   4. 10/25 [facility] for pancreas                                  Action Plans on File:                       Advance Care Plans/Directives:   Advanced Care Plan/Directives on file:   Yes    Status of  Document(s):   On File and Validated    Advanced Care Plan/Directives Type:   Advanced Directive - On File           My Medical and Care Information  Problem List   Patient Active Problem List   Diagnosis    Basal Cell Carcinoma Of The Skin    Hyperlipidemia    Onychomycosis    Osteopenia    Osteoarthritis Of The Knee    Eczema    Limb Swelling    Prediabetes    Varicose vein of leg    Midline cystocele    Seasonal allergic rhinitis    Overactive bladder    Hoarseness    Chronic kidney disease, stage 3a (H)    Chronic deep vein thrombosis (DVT) of femoral vein of left lower extremity (H)    Atrial thrombus    Abnormal stress test    Nephrolithiasis    Pancreatic mass    Complicated UTI (urinary tract infection)    Hematuria, unspecified type      Current Medications and Allergies:  See printed Medication Report.    Care Coordination Start Date: 10/16/2024   Frequency of Care Coordination: monthly, more frequently as needed     Form Last Updated: 10/22/2024

## 2024-10-18 NOTE — DISCHARGE INSTRUCTIONS
Your urine showed no sign of infection today. You should follow up with urology for further evaluation of your symptoms. We have placed a referral for you and they should call you in the next couple of days to schedule an appointment.     If you develop flank pain, fever and chills, painful or frequent urination, abdominal pain, or any new symptoms, please return to the Emergency Department for evaluation.

## 2024-10-18 NOTE — PROGRESS NOTES
Clinic Care Coordination Contact    Left  for patient asking if it was okay to change the time of our 1:00 RN assessment to either 10:00 or 1:30 due to a scheduling conflict.    Left call back number of [phone]    Chacha Toney RN  Clinic Care Coordination  [phone]     No

## 2024-10-19 LAB — BACTERIA UR CULT: NORMAL

## 2024-10-21 ENCOUNTER — TELEPHONE (OUTPATIENT)
Dept: UROLOGY | Facility: CLINIC | Age: 84
End: 2024-10-21
Payer: COMMERCIAL

## 2024-10-21 NOTE — TELEPHONE ENCOUNTER
[facility]    Phone Message    May a detailed message be left on voicemail: yes     Reason for Call: Other: Patient calling to speak with a mbr of the care team regarding her recent hospital stay . Please give patient a call back     Action Taken: Other: [facility]    Travel Screening: Not Applicable     Date of Service:

## 2024-10-22 NOTE — TELEPHONE ENCOUNTER
Left message with RN number for assistance.     WONG Montoya  Care Coordinator- Urology   [phone]

## 2024-10-23 ENCOUNTER — OFFICE VISIT (OUTPATIENT)
Dept: CARDIOLOGY | Facility: CLINIC | Age: 84
End: 2024-10-23
Payer: COMMERCIAL

## 2024-10-23 VITALS
WEIGHT: 144 LBS | DIASTOLIC BLOOD PRESSURE: 72 MMHG | SYSTOLIC BLOOD PRESSURE: 126 MMHG | HEART RATE: 80 BPM | HEIGHT: 66 IN | BODY MASS INDEX: 23.14 KG/M2 | RESPIRATION RATE: 16 BRPM

## 2024-10-23 DIAGNOSIS — R94.39 ABNORMAL STRESS TEST: ICD-10-CM

## 2024-10-23 DIAGNOSIS — I10 HYPERTENSION, UNSPECIFIED TYPE: ICD-10-CM

## 2024-10-23 DIAGNOSIS — I25.10 CORONARY ARTERY DISEASE INVOLVING NATIVE CORONARY ARTERY OF NATIVE HEART WITHOUT ANGINA PECTORIS: ICD-10-CM

## 2024-10-23 DIAGNOSIS — E78.5 DYSLIPIDEMIA: ICD-10-CM

## 2024-10-23 DIAGNOSIS — I51.3 LEFT VENTRICULAR THROMBUS: Primary | ICD-10-CM

## 2024-10-23 PROCEDURE — 99214 OFFICE O/P EST MOD 30 MIN: CPT | Performed by: INTERNAL MEDICINE

## 2024-10-23 PROCEDURE — G2211 COMPLEX E/M VISIT ADD ON: HCPCS | Performed by: INTERNAL MEDICINE

## 2024-10-23 NOTE — PROGRESS NOTES
"       [facility] [address], [place] [address]   [email or web address]   OFFICE: [phone]            Impression and Plan     1.  Possible coronary artery disease. Kalpana he has possible coronary artery disease based on nuclear perfusion imaging study 9 April 2024 revealing a small fixed perfusion defect with distal anterior hypokinesis reported though ejection fraction greater than 70%.  She had an echocardiogram performed 9 April 2024 as well that reported a \"small apical mural thrombus\".  Interestingly, CT scan of chest 16 June 2022 despite advanced age revealed no coronary calcification.  Given the small and fixed nature of the defect and continued lack of concerning symptoms would advocate conservative/medical management at this point.     I discussed the aforementioned with Kalpana and her spouse, Adebayo, who were reassured and indicate that they had similar recommendations from her cardiologist in [place].  No additional workup is felt needed at this time.     2.  Left ventricular apical mural thrombus.  Small \"apical mural thrombus\" was reported on echocardiogram 9 April 2024 at outside facility despite well-preserved left ventricular systolic performance.  Pragmatically, however, Kalpana has been maintained on apixaban for DVT prophylaxis.  Continue apixaban.     3.  Hypertension.  Blood pressure is quite reasonable in the office today at 126/72 mmHg.     4.  Dyslipidemia.  Lipid profile 23 September 2024 revealed LDL 73 mg/dL and HDL 56 mg/dL.  Continue statin therapy.    5. History of DVT.  Patient has been maintained on apixaban.     Plan on follow-up in approximately 1 year.    35 minutes spent reviewing prior records (including documentation, laboratory studies, cardiac testing/imaging), interview with patient along with physical exam, planning, and subsequent documentation/crafting of note).           History of Present Illness    Once again I would like to " "thank you again for asking me to participate in the care of your patient, Kalpana Manzo.  As you know, but to reiterate for my own records, Kalpana Manzo is a 84 year old female with possible coronary artery disease.  Kalpana mejia has possible coronary artery disease based on nuclear perfusion imaging study 9 April 2024 revealing a small fixed perfusion defect with distal anterior hypokinesis reported though ejection fraction greater than 70%.  She had an echocardiogram performed 9 April 2024 as well that reported a \"small apical mural thrombus\".  Interestingly, CT scan of chest 16 June 2022 despite advanced age revealed no coronary calcification.    Further review of systems is otherwise negative/noncontributory (medical record and 13 point review of systems reviewed as well and pertinent positives noted).         Cardiac Diagnostics      Echocardiogram 9 April 2024 (performed at  in [facility]).:  Normal left ventricular size and systolic performance with ejection fraction of 55-60%.  There is a small apical mural thrombus.  No significant valvular heart disease.  Normal right ventricular size and systolic performance.  Normal atrial dimensions.    Nuclear perfusion imaging study 9 April 2024:  There is a small fixed perfusion defect without ischemia in the distal anterior segment consistent with myocardial infarction.  Normal left ventricular systolic performance with ejection fraction greater than 70% post stress.  Distal anterior hypokinesis noted.  Stress echocardiogram 14 December 2022:  No evidence of infarct or ischemia.  Normal left ventricular systolic performance with ejection fraction of 60 to 65%.  No significant valvular heart disease on screening Doppler.  No ECG evidence of ischemia.    CT scan of chest 16 June 2022:  No acute pulmonary embolism or aortopathy.  No acute lung, airway, or pleural inflammatory process.  Cholelithiasis.  Coronary calcification: None.   " "      Physical Examination       /72 (BP Location: Left arm, Patient Position: Sitting, Cuff Size: Adult Regular)   Pulse 80   Resp 16   Ht 1.676 m (5' 6\")   Wt 65.3 kg (144 lb)   BMI 23.24 kg/m          Wt Readings from Last 3 Encounters:   10/23/24 65.3 kg (144 lb)   10/18/24 63.5 kg (140 lb)   09/23/24 65.3 kg (144 lb)       The patient is alert and oriented times three. Sclerae are anicteric. Mucosal membranes are moist. Jugular venous pressure is normal. No significant adenopathy/thyromegally appreciated. Lungs are clear with good expansion. On cardiovascular exam, the patient has a regular S1 and S2. Abdomen is soft and non-tender. Extremities reveal no clubbing, cyanosis, or edema.         Family History/Social History/Risk Factors   Patient does not smoke.  Family history reviewed, and family history includes Varicose Veins in her mother.          Medical History  Surgical History Family History Social History   Past Medical History:   Diagnosis Date    Basal cell carcinoma of skin     Created by Conversion  Replacement Utility updated for latest IMO load    Contact dermatitis and other eczema, due to unspecified cause     Created by Conversion     Dermatophytosis of nail     Created by Conversion     Disorder of bone and cartilage     Created by Conversion  Replacement Utility updated for latest IMO load    Hepatitis     Created by Conversion [facility] Annotation: Aug 22 2011  9:09AM - Mela López: in the 1980's  unspecified  Replacement Utility updated for latest IMO load    Hyperlipidemia     Created by Conversion     Nocturia     Created by Conversion     Osteoarthrosis involving lower leg     Created by Conversion  Replacement Utility updated for latest IMO load    Prediabetes 7/18/2016    Swelling of limb     Created by Conversion      Past Surgical History:   Procedure Laterality Date    BIOPSY BREAST Left 1987    BIOPSY BREAST Right 1992    BIOPSY OF BREAST, INCISIONAL      " Description: Biopsy Breast Open;  Recorded: 05/19/2008;    HC REMOVE TONSILS/ADENOIDS,<13 Y/O      Description: Tonsillectomy With Adenoidectomy;  Recorded: 05/19/2008;    REVISE SECONDARY VARICOSITY      Description: Varicose Vein Ligation;  Recorded: 05/19/2008;    TOTAL KNEE ARTHROPLASTY Right 08/2008    ZZC APPENDECTOMY      Description: Appendectomy;  Recorded: 05/19/2008;     Family History   Problem Relation Age of Onset    Varicose Veins Mother         Social History     Socioeconomic History    Marital status:      Spouse name: Not on file    Number of children: Not on file    Years of education: Not on file    Highest education level: Not on file   Occupational History    Not on file   Tobacco Use    Smoking status: Never    Smokeless tobacco: Never   Vaping Use    Vaping status: Never Used   Substance and Sexual Activity    Alcohol use: Yes     Comment: Alcoholic Drinks/day: rare    Drug use: No    Sexual activity: Not on file   Other Topics Concern    Not on file   Social History Narrative    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      Social Drivers of Health     Financial Resource Strain: Low Risk  (9/23/2024)    Financial Resource Strain     Within the past 12 months, have you or your family members you live with been unable to get utilities (heat, electricity) when it was really needed?: No   Food Insecurity: Low Risk  (9/23/2024)    Food Insecurity     Within the past 12 months, did you worry that your food would run out before you got money to buy more?: No     Within the past 12 months, did the food you bought just not last and you didn t have money to get more?: No   Transportation Needs: Low Risk  (9/23/2024)    Transportation Needs     Within the past 12 months, has lack of transportation kept you from medical appointments, getting your medicines, non-medical meetings or appointments, work, or from getting things that you need?: No   Physical Activity: Unknown (9/23/2024)    Exercise Vital Sign      Days of Exercise per Week: 2 days     Minutes of Exercise per Session: Not on file   Stress: No Stress Concern Present (9/23/2024)    [facility] - Occupational Stress Questionnaire     Feeling of Stress : Only a little   Social Connections: Unknown (9/23/2024)    Social Connection and Isolation Panel [NHANES]     Frequency of Communication with Friends and Family: Not on file     Frequency of Social Gatherings with Friends and Family: Twice a week     Attends Confucianism Services: Not on file     Active Member of Clubs or Organizations: Not on file     Attends Club or Organization Meetings: Not on file     Marital Status: Not on file   Interpersonal Safety: Low Risk  (9/23/2024)    Interpersonal Safety     Do you feel physically and emotionally safe where you currently live?: Yes     Within the past 12 months, have you been hit, slapped, kicked or otherwise physically hurt by someone?: No     Within the past 12 months, have you been humiliated or emotionally abused in other ways by your partner or ex-partner?: No   Housing Stability: Low Risk  (9/23/2024)    Housing Stability     Do you have housing? : Yes     Are you worried about losing your housing?: No           Medications  Allergies   Current Outpatient Medications   Medication Sig Dispense Refill    acetaminophen (TYLENOL) 500 MG tablet Take 500-1,000 mg by mouth every 6 hours as needed for mild pain      apixaban ANTICOAGULANT (ELIQUIS) 5 MG tablet Take 1 tablet (5 mg) by mouth 2 times daily 180 tablet 3    cholecalciferol, vitamin D3, (VITAMIN D3) 2,000 unit Tab Take 1,000 Units by mouth daily      GLUCOSAM HCL/CHONDRO MONTES A/C/[place] (GLUCOSAMINE-CHONDROITIN COMPLX ORAL) [GLUCOSAM HCL/CHONDRO MONTES A/C/MN (GLUCOSAMINE-CHONDROITIN COMPLX ORAL)] Take 1 tablet by mouth daily. Tablet      multivitamin capsule [MULTIVITAMIN CAPSULE] Take 1 capsule by mouth daily.      simvastatin (ZOCOR) 10 MG tablet TAKE 1 TABLET (10 MG) BY MOUTH DAILY. 90  tablet 1       Allergies   Allergen Reactions    Sulfamethoxazole-Trimethoprim Dizziness    Tetracycline Nausea          Lab Results    Chemistry/lipid CBC Cardiac Enzymes/BNP/TSH/INR   Recent Labs   Lab Test 09/23/24  0957   CHOL 145   HDL 56   LDL 73   TRIG 82     Recent Labs   Lab Test 09/23/24  0957 05/22/23  0934 08/30/21  1010   LDL 73 74 86     Recent Labs   Lab Test 09/23/24  0957      POTASSIUM 4.2   CHLORIDE 107   CO2 26   GLC 82   BUN 14.3   CR 1.18*   GFRESTIMATED 45*   DAVID 8.9     Recent Labs   Lab Test 09/23/24  0957 09/13/24  1734 09/05/24  1638   CR 1.18* 1.18* 1.13*     Recent Labs   Lab Test 09/23/24  0957 05/22/23  0934 08/30/21  1010   A1C 5.3 5.5 5.7*          Recent Labs   Lab Test 09/13/24  1734   WBC 5.7   HGB 11.6*   HCT 36.1   MCV 94        Recent Labs   Lab Test 09/13/24  1734 09/05/24  1638 08/23/24  0632   HGB 11.6* 11.3* 10.7*    Recent Labs   Lab Test 06/16/22  0747 06/08/22  1636   TROPONINI <0.01 <0.01     Recent Labs   Lab Test 06/08/22  1636        Recent Labs   Lab Test 08/22/24  1032   TSH 3.13     Recent Labs   Lab Test 09/13/24  1734   INR 1.53*          Medications  Allergies   Current Outpatient Medications   Medication Sig Dispense Refill    acetaminophen (TYLENOL) 500 MG tablet Take 500-1,000 mg by mouth every 6 hours as needed for mild pain      apixaban ANTICOAGULANT (ELIQUIS) 5 MG tablet Take 1 tablet (5 mg) by mouth 2 times daily 180 tablet 3    cholecalciferol, vitamin D3, (VITAMIN D3) 2,000 unit Tab Take 1,000 Units by mouth daily      GLUCOSAM HCL/CHONDRO MONTES A/C/MN (GLUCOSAMINE-CHONDROITIN COMPLX ORAL) [GLUCOSAM HCL/CHONDRO MONTES A/C/MN (GLUCOSAMINE-CHONDROITIN COMPLX ORAL)] Take 1 tablet by mouth daily. Tablet      multivitamin capsule [MULTIVITAMIN CAPSULE] Take 1 capsule by mouth daily.      simvastatin (ZOCOR) 10 MG tablet TAKE 1 TABLET (10 MG) BY MOUTH DAILY. 90 tablet 1      Allergies   Allergen Reactions    Sulfamethoxazole-Trimethoprim  Dizziness    Tetracycline Nausea          Lab Results   Lab Results   Component Value Date     09/23/2024    CO2 26 09/23/2024    CO2 23 06/16/2022    BUN 14.3 09/23/2024    BUN 21 06/16/2022     Lab Results   Component Value Date    WBC 5.7 09/13/2024    HGB 11.6 09/13/2024    HCT 36.1 09/13/2024    MCV 94 09/13/2024     09/13/2024     Lab Results   Component Value Date    CHOL 145 09/23/2024    TRIG 82 09/23/2024    TRIG 96 03/18/2022    HDL 56 09/23/2024     Lab Results   Component Value Date    INR 1.53 09/13/2024     Lab Results   Component Value Date     06/08/2022     Lab Results   Component Value Date    TROPONINI <0.01 06/16/2022    TROPONINI <0.01 06/08/2022     Lab Results   Component Value Date    TSH 3.13 08/22/2024

## 2024-10-23 NOTE — LETTER
"10/23/2024    TANIKA YU MD  [address] [address]    RE: Kalpana Manzo       Dear Colleague,     I had the pleasure of seeing Kalpana Manzo in the [facility].         [facility] [address], [place] [address]   [email or web address]   OFFICE: [phone]            Impression and Plan     1.  Possible coronary artery disease. Kalpana he has possible coronary artery disease based on nuclear perfusion imaging study 9 April 2024 revealing a small fixed perfusion defect with distal anterior hypokinesis reported though ejection fraction greater than 70%.  She had an echocardiogram performed 9 April 2024 as well that reported a \"small apical mural thrombus\".  Interestingly, CT scan of chest 16 June 2022 despite advanced age revealed no coronary calcification.  Given the small and fixed nature of the defect and continued lack of concerning symptoms would advocate conservative/medical management at this point.     I discussed the aforementioned with Kalpana and her spouse, Adebayo, who were reassured and indicate that they had similar recommendations from her cardiologist in [place].  No additional workup is felt needed at this time.     2.  Left ventricular apical mural thrombus.  Small \"apical mural thrombus\" was reported on echocardiogram 9 April 2024 at outside facility despite well-preserved left ventricular systolic performance.  Pragmatically, however, Kalpana has been maintained on apixaban for DVT prophylaxis.  Continue apixaban.     3.  Hypertension.  Blood pressure is quite reasonable in the office today at 126/72 mmHg.     4.  Dyslipidemia.  Lipid profile 23 September 2024 revealed LDL 73 mg/dL and HDL 56 mg/dL.  Continue statin therapy.    5. History of DVT.  Patient has been maintained on apixaban.     Plan on follow-up in approximately 1 year.    35 minutes spent reviewing prior records (including documentation, " "laboratory studies, cardiac testing/imaging), interview with patient along with physical exam, planning, and subsequent documentation/crafting of note).           History of Present Illness    Once again I would like to thank you again for asking me to participate in the care of your patient, Kalpana Manzo.  As you know, but to reiterate for my own records, Kalpana Manzo is a 84 year old female with possible coronary artery disease.  Kalpana mejia has possible coronary artery disease based on nuclear perfusion imaging study 9 April 2024 revealing a small fixed perfusion defect with distal anterior hypokinesis reported though ejection fraction greater than 70%.  She had an echocardiogram performed 9 April 2024 as well that reported a \"small apical mural thrombus\".  Interestingly, CT scan of chest 16 June 2022 despite advanced age revealed no coronary calcification.    Further review of systems is otherwise negative/noncontributory (medical record and 13 point review of systems reviewed as well and pertinent positives noted).         Cardiac Diagnostics      Echocardiogram 9 April 2024 (performed at [facility] in [facility]:  Normal left ventricular size and systolic performance with ejection fraction of 55-60%.  There is a small apical mural thrombus.  No significant valvular heart disease.  Normal right ventricular size and systolic performance.  Normal atrial dimensions.    Nuclear perfusion imaging study 9 April 2024:  There is a small fixed perfusion defect without ischemia in the distal anterior segment consistent with myocardial infarction.  Normal left ventricular systolic performance with ejection fraction greater than 70% post stress.  Distal anterior hypokinesis noted.  Stress echocardiogram 14 December 2022:  No evidence of infarct or ischemia.  Normal left ventricular systolic performance with ejection fraction of 60 to 65%.  No significant valvular heart disease on screening Doppler.  No " "ECG evidence of ischemia.    CT scan of chest 16 June 2022:  No acute pulmonary embolism or aortopathy.  No acute lung, airway, or pleural inflammatory process.  Cholelithiasis.  Coronary calcification: None.         Physical Examination       /72 (BP Location: Left arm, Patient Position: Sitting, Cuff Size: Adult Regular)   Pulse 80   Resp 16   Ht 1.676 m (5' 6\")   Wt 65.3 kg (144 lb)   BMI 23.24 kg/m          Wt Readings from Last 3 Encounters:   10/23/24 65.3 kg (144 lb)   10/18/24 63.5 kg (140 lb)   09/23/24 65.3 kg (144 lb)       The patient is alert and oriented times three. Sclerae are anicteric. Mucosal membranes are moist. Jugular venous pressure is normal. No significant adenopathy/thyromegally appreciated. Lungs are clear with good expansion. On cardiovascular exam, the patient has a regular S1 and S2. Abdomen is soft and non-tender. Extremities reveal no clubbing, cyanosis, or edema.         Family History/Social History/Risk Factors   Patient does not smoke.  Family history reviewed, and family history includes Varicose Veins in her mother.          Medical History  Surgical History Family History Social History   Past Medical History:   Diagnosis Date     Basal cell carcinoma of skin     Created by Conversion  Replacement Utility updated for latest IMO load     Contact dermatitis and other eczema, due to unspecified cause     Created by Conversion      Dermatophytosis of nail     Created by Conversion      Disorder of bone and cartilage     Created by Conversion  Replacement Utility updated for latest IMO load     Hepatitis     Created by Conversion [facility] Annotation: Aug 22 2011  9:09AM - Mela López: in the 1980's  unspecified  Replacement Utility updated for latest IMO load     Hyperlipidemia     Created by Conversion      Nocturia     Created by Conversion      Osteoarthrosis involving lower leg     Created by Conversion  Replacement Utility updated for latest IMO load     " Prediabetes 7/18/2016     Swelling of limb     Created by Conversion      Past Surgical History:   Procedure Laterality Date     BIOPSY BREAST Left 1987     BIOPSY BREAST Right 1992     BIOPSY OF BREAST, INCISIONAL      Description: Biopsy Breast Open;  Recorded: 05/19/2008;     HC REMOVE TONSILS/ADENOIDS,<13 Y/O      Description: Tonsillectomy With Adenoidectomy;  Recorded: 05/19/2008;     REVISE SECONDARY VARICOSITY      Description: Varicose Vein Ligation;  Recorded: 05/19/2008;     TOTAL KNEE ARTHROPLASTY Right 08/2008     ZZC APPENDECTOMY      Description: Appendectomy;  Recorded: 05/19/2008;     Family History   Problem Relation Age of Onset     Varicose Veins Mother         Social History     Socioeconomic History     Marital status:      Spouse name: Not on file     Number of children: Not on file     Years of education: Not on file     Highest education level: Not on file   Occupational History     Not on file   Tobacco Use     Smoking status: Never     Smokeless tobacco: Never   Vaping Use     Vaping status: Never Used   Substance and Sexual Activity     Alcohol use: Yes     Comment: Alcoholic Drinks/day: rare     Drug use: No     Sexual activity: Not on file   Other Topics Concern     Not on file   Social History Narrative    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      Social Drivers of Health     Financial Resource Strain: Low Risk  (9/23/2024)    Financial Resource Strain      Within the past 12 months, have you or your family members you live with been unable to get utilities (heat, electricity) when it was really needed?: No   Food Insecurity: Low Risk  (9/23/2024)    Food Insecurity      Within the past 12 months, did you worry that your food would run out before you got money to buy more?: No      Within the past 12 months, did the food you bought just not last and you didn t have money to get more?: No   Transportation Needs: Low Risk  (9/23/2024)    Transportation Needs      Within the past 12 months,  has lack of transportation kept you from medical appointments, getting your medicines, non-medical meetings or appointments, work, or from getting things that you need?: No   Physical Activity: Unknown (9/23/2024)    Exercise Vital Sign      Days of Exercise per Week: 2 days      Minutes of Exercise per Session: Not on file   Stress: No Stress Concern Present (9/23/2024)    [employer] [place] [employer] - Occupational Stress Questionnaire      Feeling of Stress : Only a little   Social Connections: Unknown (9/23/2024)    Social Connection and Isolation Panel [NHANES]      Frequency of Communication with Friends and Family: Not on file      Frequency of Social Gatherings with Friends and Family: Twice a week      Attends Congregation Services: Not on file      Active Member of Clubs or Organizations: Not on file      Attends Club or Organization Meetings: Not on file      Marital Status: Not on file   Interpersonal Safety: Low Risk  (9/23/2024)    Interpersonal Safety      Do you feel physically and emotionally safe where you currently live?: Yes      Within the past 12 months, have you been hit, slapped, kicked or otherwise physically hurt by someone?: No      Within the past 12 months, have you been humiliated or emotionally abused in other ways by your partner or ex-partner?: No   Housing Stability: Low Risk  (9/23/2024)    Housing Stability      Do you have housing? : Yes      Are you worried about losing your housing?: No           Medications  Allergies   Current Outpatient Medications   Medication Sig Dispense Refill     acetaminophen (TYLENOL) 500 MG tablet Take 500-1,000 mg by mouth every 6 hours as needed for mild pain       apixaban ANTICOAGULANT (ELIQUIS) 5 MG tablet Take 1 tablet (5 mg) by mouth 2 times daily 180 tablet 3     cholecalciferol, vitamin D3, (VITAMIN D3) 2,000 unit Tab Take 1,000 Units by mouth daily       GLUCOSAM HCL/CHONDRO MONTES A/C/[place] (GLUCOSAMINE-CHONDROITIN COMPLX ORAL) [GLUCOSAM  HCL/CHONDRO MONTES A/C/MN (GLUCOSAMINE-CHONDROITIN COMPLX ORAL)] Take 1 tablet by mouth daily. Tablet       multivitamin capsule [MULTIVITAMIN CAPSULE] Take 1 capsule by mouth daily.       simvastatin (ZOCOR) 10 MG tablet TAKE 1 TABLET (10 MG) BY MOUTH DAILY. 90 tablet 1       Allergies   Allergen Reactions     Sulfamethoxazole-Trimethoprim Dizziness     Tetracycline Nausea          Lab Results    Chemistry/lipid CBC Cardiac Enzymes/BNP/TSH/INR   Recent Labs   Lab Test 09/23/24  0957   CHOL 145   HDL 56   LDL 73   TRIG 82     Recent Labs   Lab Test 09/23/24  0957 05/22/23  0934 08/30/21  1010   LDL 73 74 86     Recent Labs   Lab Test 09/23/24  0957      POTASSIUM 4.2   CHLORIDE 107   CO2 26   GLC 82   BUN 14.3   CR 1.18*   GFRESTIMATED 45*   DAVID 8.9     Recent Labs   Lab Test 09/23/24  0957 09/13/24  1734 09/05/24  1638   CR 1.18* 1.18* 1.13*     Recent Labs   Lab Test 09/23/24  0957 05/22/23  0934 08/30/21  1010   A1C 5.3 5.5 5.7*          Recent Labs   Lab Test 09/13/24  1734   WBC 5.7   HGB 11.6*   HCT 36.1   MCV 94        Recent Labs   Lab Test 09/13/24  1734 09/05/24  1638 08/23/24  0632   HGB 11.6* 11.3* 10.7*    Recent Labs   Lab Test 06/16/22  0747 06/08/22  1636   TROPONINI <0.01 <0.01     Recent Labs   Lab Test 06/08/22  1636        Recent Labs   Lab Test 08/22/24  1032   TSH 3.13     Recent Labs   Lab Test 09/13/24  1734   INR 1.53*          Medications  Allergies   Current Outpatient Medications   Medication Sig Dispense Refill     acetaminophen (TYLENOL) 500 MG tablet Take 500-1,000 mg by mouth every 6 hours as needed for mild pain       apixaban ANTICOAGULANT (ELIQUIS) 5 MG tablet Take 1 tablet (5 mg) by mouth 2 times daily 180 tablet 3     cholecalciferol, vitamin D3, (VITAMIN D3) 2,000 unit Tab Take 1,000 Units by mouth daily       GLUCOSAM HCL/CHONDRO MONTES A/C/MN (GLUCOSAMINE-CHONDROITIN COMPLX ORAL) [GLUCOSAM HCL/CHONDRO MONTES A/C/MN (GLUCOSAMINE-CHONDROITIN COMPLX ORAL)] Take 1 tablet  by mouth daily. Tablet       multivitamin capsule [MULTIVITAMIN CAPSULE] Take 1 capsule by mouth daily.       simvastatin (ZOCOR) 10 MG tablet TAKE 1 TABLET (10 MG) BY MOUTH DAILY. 90 tablet 1      Allergies   Allergen Reactions     Sulfamethoxazole-Trimethoprim Dizziness     Tetracycline Nausea          Lab Results   Lab Results   Component Value Date     09/23/2024    CO2 26 09/23/2024    CO2 23 06/16/2022    BUN 14.3 09/23/2024    BUN 21 06/16/2022     Lab Results   Component Value Date    WBC 5.7 09/13/2024    HGB 11.6 09/13/2024    HCT 36.1 09/13/2024    MCV 94 09/13/2024     09/13/2024     Lab Results   Component Value Date    CHOL 145 09/23/2024    TRIG 82 09/23/2024    TRIG 96 03/18/2022    HDL 56 09/23/2024     Lab Results   Component Value Date    INR 1.53 09/13/2024     Lab Results   Component Value Date     06/08/2022     Lab Results   Component Value Date    TROPONINI <0.01 06/16/2022    TROPONINI <0.01 06/08/2022     Lab Results   Component Value Date    TSH 3.13 08/22/2024                      Thank you for allowing me to participate in the care of your patient.      Sincerely,     Elias Fernández MD     [facility]  cc:   Elias Fernández MD  [address] [place] [address]

## 2024-10-25 ENCOUNTER — TRANSFERRED RECORDS (OUTPATIENT)
Dept: HEALTH INFORMATION MANAGEMENT | Facility: CLINIC | Age: 84
End: 2024-10-25
Payer: COMMERCIAL

## 2024-10-25 ENCOUNTER — TELEPHONE (OUTPATIENT)
Dept: FAMILY MEDICINE | Facility: CLINIC | Age: 84
End: 2024-10-25
Payer: COMMERCIAL

## 2024-10-28 ENCOUNTER — OFFICE VISIT (OUTPATIENT)
Dept: FAMILY MEDICINE | Facility: CLINIC | Age: 84
End: 2024-10-28
Payer: COMMERCIAL

## 2024-10-28 VITALS
WEIGHT: 142.8 LBS | TEMPERATURE: 97.7 F | DIASTOLIC BLOOD PRESSURE: 82 MMHG | BODY MASS INDEX: 22.95 KG/M2 | SYSTOLIC BLOOD PRESSURE: 149 MMHG | OXYGEN SATURATION: 98 % | HEART RATE: 77 BPM | RESPIRATION RATE: 14 BRPM | HEIGHT: 66 IN

## 2024-10-28 DIAGNOSIS — Z01.818 PREOP GENERAL PHYSICAL EXAM: Primary | ICD-10-CM

## 2024-10-28 DIAGNOSIS — K86.89 PANCREATIC MASS: ICD-10-CM

## 2024-10-28 LAB
ATRIAL RATE - MUSE: 55 BPM
DIASTOLIC BLOOD PRESSURE - MUSE: NORMAL MMHG
INTERPRETATION ECG - MUSE: NORMAL
P AXIS - MUSE: 23 DEGREES
PR INTERVAL - MUSE: 138 MS
QRS DURATION - MUSE: 86 MS
QT - MUSE: 402 MS
QTC - MUSE: 384 MS
R AXIS - MUSE: 12 DEGREES
SYSTOLIC BLOOD PRESSURE - MUSE: NORMAL MMHG
T AXIS - MUSE: 16 DEGREES
VENTRICULAR RATE- MUSE: 55 BPM

## 2024-10-28 PROCEDURE — 99213 OFFICE O/P EST LOW 20 MIN: CPT | Performed by: FAMILY MEDICINE

## 2024-10-28 PROCEDURE — 93005 ELECTROCARDIOGRAM TRACING: CPT | Performed by: FAMILY MEDICINE

## 2024-10-28 PROCEDURE — 93010 ELECTROCARDIOGRAM REPORT: CPT | Performed by: STUDENT IN AN ORGANIZED HEALTH CARE EDUCATION/TRAINING PROGRAM

## 2024-10-28 NOTE — PROGRESS NOTES
Preoperative Evaluation  [facility]  [address]  [facility] [address]  Phone: [phone]  Fax: [phone]  Primary Provider: TANIKA YU MD  Pre-op Performing Provider: TANIKA YU MD  Oct 28, 2024             10/28/2024   Surgical Information   What procedure is being done? EUS       Facility or Hospital where procedure/surgery will be performed: [facility]        Who is doing the procedure / surgery? Dr Aldana       Date of surgery / procedure: Nov 8        Time of surgery / procedure: None set yet        Where do you plan to recover after surgery? at home with family        Patient-reported    Multiple values from one day are sorted in reverse-chronological order     Fax number for surgical facility: Note does not need to be faxed, will be available electronically in Epic.    Assessment & Plan     The proposed surgical procedure is considered LOW risk.    Problem List Items Addressed This Visit       Pancreatic mass     Other Visit Diagnoses       Preop general physical exam    -  Primary    Relevant Orders    EKG 12-lead, tracing only (Completed)             - No identified additional risk factors other than previously addressed    Antiplatelet or Anticoagulation Medication Instructions   - apixaban (Eliquis), edoxaban (Savaysa), rivaroxaban (Xarelto): Neuraxial or regional block anticipated AND CrCL (>=) 50mL/min. DO NOT TAKE 3 days before surgery.     Additional Medication Instructions   - Statins: Continue taking on the day of surgery.    -Hold all supplements 7 days prior to procedure    Recommendation  Approval given to proceed with proposed procedure, without further diagnostic evaluation.    Rowena Acuna is a 84 year old, presenting for the following:  Pre-Op Exam          10/28/2024     2:05 PM   Additional Questions   Roomed by as   Accompanied by self         10/28/2024     2:05 PM   Patient Reported Additional Medications   Patient  reports taking the following new medications no     HPI related to upcoming procedure: The patient had a fall this summer and a CT scan incidentally found a pancreatic mass/lesion. She was scheduled sooner for EUS procedure for further evaluation/biopsy but then became septic and ill with a UTI. The EUS is now rescheduled for November 8th.         10/28/2024   Pre-Op Questionnaire   Have you ever had a heart attack or stroke? No    Have you ever had surgery on your heart or blood vessels, such as a stent placement, a coronary artery bypass, or surgery on an artery in your head, neck, heart, or legs? No    Do you have chest pain with activity? No    Do you have a history of heart failure? No    Do you currently have a cold, bronchitis or symptoms of other infection? No    Do you have a cough, shortness of breath, or wheezing? No    Do you or anyone in your family have previous history of blood clots? (!) YES: personal history DVT    Do you or does anyone in your family have a serious bleeding problem such as prolonged bleeding following surgeries or cuts? No    Have you ever had problems with anemia or been told to take iron pills? No    Have you had any abnormal blood loss such as black, tarry or bloody stools, or abnormal vaginal bleeding? (!) YES: gross hematuria due to nephrolithiasis on Eliquis    Have you ever had a blood transfusion? No    Are you willing to have a blood transfusion if it is medically needed before, during, or after your surgery? Yes    Have you or any of your relatives ever had problems with anesthesia? No    Do you have sleep apnea, excessive snoring or daytime drowsiness? No    Do you have any artifical heart valves or other implanted medical devices like a pacemaker, defibrillator, or continuous glucose monitor? No    Do you have artificial joints? (!) YES    Are you allergic to latex? No        Patient-reported     Health Care Directive  Patient has a Health Care Directive on  file      Preoperative Review of    reviewed - no record of controlled substances prescribed.      Status of Chronic Conditions:  See problem list for active medical problems.  Problems all longstanding and stable, except as noted/documented.  See ROS for pertinent symptoms related to these conditions.    Patient Active Problem List    Diagnosis Date Noted    Hematuria, unspecified type 09/06/2024     Priority: Medium    Complicated UTI (urinary tract infection) 08/22/2024     Priority: Medium    Nephrolithiasis 07/08/2024     Priority: Medium    Pancreatic mass 07/08/2024     Priority: Medium    Chronic deep vein thrombosis (DVT) of femoral vein of left lower extremity (H) 05/20/2024     Priority: Medium    Atrial thrombus 05/20/2024     Priority: Medium    Abnormal stress test 05/20/2024     Priority: Medium    Chronic kidney disease, stage 3a (H) 05/22/2023     Priority: Medium    Overactive bladder 05/02/2022     Priority: Medium    Hoarseness 05/02/2022     Priority: Medium    Seasonal allergic rhinitis 05/17/2021     Priority: Medium    Hyperlipidemia      Priority: Medium     Created by Conversion        Osteopenia      Priority: Medium     Created by Conversion  Replacement Utility updated for latest IMO load        Midline cystocele 07/06/2020     Priority: Medium    Varicose vein of leg 08/06/2018     Priority: Medium    Basal Cell Carcinoma Of The Skin      Priority: Medium     Created by Conversion  Replacement Utility updated for latest IMO load        Osteoarthritis Of The Knee      Priority: Medium     Created by Conversion  Replacement Utility updated for latest IMO load    Replacing diagnoses that were inactivated after the 10/1/2021 regulatory import.      Prediabetes 07/18/2016     Priority: Medium    Eczema      Priority: Medium     Created by Conversion        Limb Swelling      Priority: Medium     Created by Conversion        Onychomycosis      Priority: Medium     Created by  Conversion          Past Medical History:   Diagnosis Date    Basal cell carcinoma of skin     Created by Conversion  Replacement Utility updated for latest IMO load    Contact dermatitis and other eczema, due to unspecified cause     Created by Conversion     Dermatophytosis of nail     Created by Conversion     Disorder of bone and cartilage     Created by Conversion  Replacement Utility updated for latest IMO load    Hepatitis     Created by Conversion [facility] Annotation: Aug 22 2011  9:09AM - Mela López: in the 1980's  unspecified  Replacement Utility updated for latest IMO load    Hyperlipidemia     Created by Conversion     Nocturia     Created by Conversion     Osteoarthrosis involving lower leg     Created by Conversion  Replacement Utility updated for latest IMO load    Prediabetes 7/18/2016    Swelling of limb     Created by Conversion      Past Surgical History:   Procedure Laterality Date    BIOPSY BREAST Left 1987    BIOPSY BREAST Right 1992    BIOPSY OF BREAST, INCISIONAL      Description: Biopsy Breast Open;  Recorded: 05/19/2008;    HC REMOVE TONSILS/ADENOIDS,<11 Y/O      Description: Tonsillectomy With Adenoidectomy;  Recorded: 05/19/2008;    REVISE SECONDARY VARICOSITY      Description: Varicose Vein Ligation;  Recorded: 05/19/2008;    TOTAL KNEE ARTHROPLASTY Right 08/2008    [facility] APPENDECTOMY      Description: Appendectomy;  Recorded: 05/19/2008;     Current Outpatient Medications   Medication Sig Dispense Refill    acetaminophen (TYLENOL) 500 MG tablet Take 500-1,000 mg by mouth every 6 hours as needed for mild pain      apixaban ANTICOAGULANT (ELIQUIS) 5 MG tablet Take 1 tablet (5 mg) by mouth 2 times daily 180 tablet 3    cholecalciferol, vitamin D3, (VITAMIN D3) 2,000 unit Tab Take 1,000 Units by mouth daily      GLUCOSAM HCL/CHONDRO MONTES A/C/MN (GLUCOSAMINE-CHONDROITIN COMPLX ORAL) [GLUCOSAM HCL/CHONDRO MONTES A/C/MN (GLUCOSAMINE-CHONDROITIN COMPLX ORAL)] Take 1 tablet by mouth daily.  "Tablet      multivitamin capsule [MULTIVITAMIN CAPSULE] Take 1 capsule by mouth daily.      simvastatin (ZOCOR) 10 MG tablet TAKE 1 TABLET (10 MG) BY MOUTH DAILY. 90 tablet 1       Allergies   Allergen Reactions    Sulfamethoxazole-Trimethoprim Dizziness    Tetracycline Nausea        Social History     Tobacco Use    Smoking status: Never    Smokeless tobacco: Never   Substance Use Topics    Alcohol use: Yes     Comment: Alcoholic Drinks/day: rare     Family History   Problem Relation Age of Onset    Varicose Veins Mother      History   Drug Use No             Review of Systems  CONSTITUTIONAL: NEGATIVE for fever, chills, change in weight  INTEGUMENTARY/SKIN: NEGATIVE for worrisome rashes, moles or lesions  EYES: NEGATIVE for vision changes or irritation  ENT/MOUTH: NEGATIVE for ear, mouth and throat problems  RESP: NEGATIVE for significant cough or SOB  BREAST: NEGATIVE for masses, tenderness or discharge  CV: NEGATIVE for chest pain, palpitations or peripheral edema  GI: NEGATIVE for nausea, abdominal pain, heartburn, or change in bowel habits  : NEGATIVE for frequency, dysuria, or hematuria  MUSCULOSKELETAL: NEGATIVE for significant arthralgias or myalgia  NEURO: NEGATIVE for weakness, dizziness or paresthesias  ENDOCRINE: NEGATIVE for temperature intolerance, skin/hair changes  HEME: NEGATIVE for bleeding problems  PSYCHIATRIC: NEGATIVE for changes in mood or affect    Objective    BP (!) 149/82 (BP Location: Left arm, Patient Position: Left side, Cuff Size: Adult Regular)   Pulse 77   Temp 97.7  F (36.5  C) (Oral)   Resp 14   Ht 1.676 m (5' 6\")   Wt 64.8 kg (142 lb 12.8 oz)   SpO2 98%   BMI 23.05 kg/m     Estimated body mass index is 23.05 kg/m  as calculated from the following:    Height as of this encounter: 1.676 m (5' 6\").    Weight as of this encounter: 64.8 kg (142 lb 12.8 oz).  Physical Exam  GENERAL: alert and no distress  EYES: Eyes grossly normal to inspection, PERRL and conjunctivae and " sclerae normal  HENT: ear canals and TM's normal, nose and mouth without ulcers or lesions  NECK: no adenopathy, no asymmetry, masses, or scars  RESP: lungs clear to auscultation - no rales, rhonchi or wheezes  CV: regular rate and rhythm, normal S1 S2, no S3 or S4, no murmur, click or rub, no peripheral edema  ABDOMEN: soft, nontender, no hepatosplenomegaly, no masses and bowel sounds normal  MS: no gross musculoskeletal defects noted, no edema  SKIN: no suspicious lesions or rashes  NEURO: Normal strength and tone, mentation intact and speech normal  PSYCH: mentation appears normal, affect normal/bright    Recent Labs   Lab Test 09/23/24  0957 09/13/24  1734 09/05/24  1638   HGB  --  11.6* 11.3*   PLT  --  232 324   INR  --  1.53*  --     147* 143   POTASSIUM 4.2 4.0 3.5   CR 1.18* 1.18* 1.13*   A1C 5.3  --   --         Diagnostics  No labs were ordered during this visit.   EKG: Sinus bradycardia   Nonspecific ST abnormality   Abnormal ECG   When compared with ECG of 22-Aug-2024 12:50,   Premature ventricular complexes are no longer Present     Revised Cardiac Risk Index (RCRI)  The patient has the following serious cardiovascular risks for perioperative complications:   - No serious cardiac risks = 0 points     RCRI Interpretation: 0 points: Class I (very low risk - 0.4% complication rate)         Signed Electronically by: TANIKA YU MD  A copy of this evaluation report is provided to the requesting physician.

## 2024-10-28 NOTE — TELEPHONE ENCOUNTER
Called Stephanie BACK from [facility] and relayed approval for requested order regarding Eliquis.  Stephanie BACK from [facility] will call to update patient.

## 2024-10-28 NOTE — PATIENT INSTRUCTIONS
Antiplatelet or Anticoagulation Medication Instructions   - apixaban (Eliquis), edoxaban (Savaysa), rivaroxaban (Xarelto): Neuraxial or regional block anticipated AND CrCL (>=) 50mL/min. DO NOT TAKE 3 days before surgery.     Additional Medication Instructions   - Statins: Continue taking on the day of surgery.    -Hold all supplements 7 days prior to procedure

## 2024-11-01 ENCOUNTER — PATIENT OUTREACH (OUTPATIENT)
Dept: CARE COORDINATION | Facility: CLINIC | Age: 84
End: 2024-11-01
Payer: COMMERCIAL

## 2024-11-01 NOTE — PROGRESS NOTES
Clinic Care Coordination Contact  [facility]/Voicemail    Clinical Data: Care Coordinator Outreach    Outreach Documentation Number of Outreach Attempt   10/8/2024  11:25 AM 1   10/9/2024  10:48 AM 2   11/1/2024  11:13 AM 1       Left message on patient's voicemail with call back information and requested return call.      Plan: Care Coordinator will try to reach patient again in 1-2 business days.    [facility] x 1

## 2024-11-04 NOTE — PROGRESS NOTES
Clinic Care Coordination Contact  Care Coordination Clinician Chart Review    Situation: Patient chart reviewed by Care Coordinator.       Background: Care Coordination Program started: 10/16/2024. Initial assessment completed and patient-centered care plan(s) were developed with participation from patient. Lead CC handed patient off to CHW for continued outreaches.       Assessment: Per chart review, patient outreach completed by CC RN on 11/1/24.  Patient is actively working to accomplish goal(s). Patient's goal(s) appropriate and relevant at this time. Patient is not due for updated Plan of Care.  Assessments will be completed annually or as needed/with change of patient status.      Care Plan: Appointments/Referrals       Problem: HP GENERAL PROBLEM       Goal: I would like to attend the following appointments.       Start Date: 10/18/2024    Note:     Barriers: numerous appointments  Strengths: motivated  Patient expressed understanding of goal: yes  Action steps to achieve this goal:    1. 11/8 fine needle aspiration of pancreatic mass  2.  [facility] for pancreas follow up after procedure  3. 11/25 @ 8:45 xray & 11/27 @ 9:15 [facility]                                     Plan/Recommendations: The patient will continue working with Care Coordination to achieve goal(s) as above. CHW will continue outreaches at minimum every 30 days and will involve Lead CC as needed or if patient is ready to move to Maintenance. Lead CC will continue to monitor CHW outreaches and patient's progress to goal(s) every 6 weeks.     Plan of Care updated and sent to patient: No

## 2024-11-08 ENCOUNTER — ANESTHESIA EVENT (OUTPATIENT)
Dept: SURGERY | Facility: HOSPITAL | Age: 84
End: 2024-11-08
Payer: COMMERCIAL

## 2024-11-08 ENCOUNTER — HOSPITAL ENCOUNTER (OUTPATIENT)
Facility: HOSPITAL | Age: 84
Discharge: HOME | End: 2024-11-08
Attending: INTERNAL MEDICINE | Admitting: INTERNAL MEDICINE
Payer: COMMERCIAL

## 2024-11-08 ENCOUNTER — ANESTHESIA (OUTPATIENT)
Dept: SURGERY | Facility: HOSPITAL | Age: 84
End: 2024-11-08
Payer: COMMERCIAL

## 2024-11-08 VITALS
WEIGHT: 139.4 LBS | SYSTOLIC BLOOD PRESSURE: 135 MMHG | DIASTOLIC BLOOD PRESSURE: 64 MMHG | OXYGEN SATURATION: 96 % | RESPIRATION RATE: 16 BRPM | HEART RATE: 69 BPM | BODY MASS INDEX: 22.5 KG/M2 | TEMPERATURE: 97.5 F

## 2024-11-08 DIAGNOSIS — K86.2 PANCREATIC CYST: Primary | ICD-10-CM

## 2024-11-08 LAB
AMYLASE FLD-CCNC: 4402 U/L
CEA FLD-MCNC: 261 NG/ML
GLUCOSE FLD-MCNC: <2 MG/DL
UPPER EUS: NORMAL

## 2024-11-08 PROCEDURE — 250N000011 HC RX IP 250 OP 636: Performed by: NURSE ANESTHETIST, CERTIFIED REGISTERED

## 2024-11-08 PROCEDURE — 258N000003 HC RX IP 258 OP 636: Performed by: NURSE ANESTHETIST, CERTIFIED REGISTERED

## 2024-11-08 PROCEDURE — C1769 GUIDE WIRE: HCPCS | Performed by: INTERNAL MEDICINE

## 2024-11-08 PROCEDURE — 43238 EGD US FINE NEEDLE BX/ASPIR: CPT | Performed by: NURSE ANESTHETIST, CERTIFIED REGISTERED

## 2024-11-08 PROCEDURE — 250N000009 HC RX 250: Performed by: NURSE ANESTHETIST, CERTIFIED REGISTERED

## 2024-11-08 PROCEDURE — 88313 SPECIAL STAINS GROUP 2: CPT | Mod: 26 | Performed by: PATHOLOGY

## 2024-11-08 PROCEDURE — 99100 ANES PT EXTEME AGE<1 YR&>70: CPT | Performed by: NURSE ANESTHETIST, CERTIFIED REGISTERED

## 2024-11-08 PROCEDURE — 43238 EGD US FINE NEEDLE BX/ASPIR: CPT | Performed by: ANESTHESIOLOGY

## 2024-11-08 PROCEDURE — 999N000141 HC STATISTIC PRE-PROCEDURE NURSING ASSESSMENT: Performed by: INTERNAL MEDICINE

## 2024-11-08 PROCEDURE — 82378 CARCINOEMBRYONIC ANTIGEN: CPT | Performed by: INTERNAL MEDICINE

## 2024-11-08 PROCEDURE — 360N000076 HC SURGERY LEVEL 3, PER MIN: Performed by: INTERNAL MEDICINE

## 2024-11-08 PROCEDURE — 370N000017 HC ANESTHESIA TECHNICAL FEE, PER MIN: Performed by: INTERNAL MEDICINE

## 2024-11-08 PROCEDURE — 88108 CYTOPATH CONCENTRATE TECH: CPT | Mod: 26 | Performed by: PATHOLOGY

## 2024-11-08 PROCEDURE — 272N000001 HC OR GENERAL SUPPLY STERILE: Performed by: INTERNAL MEDICINE

## 2024-11-08 PROCEDURE — 88108 CYTOPATH CONCENTRATE TECH: CPT | Mod: TC | Performed by: INTERNAL MEDICINE

## 2024-11-08 PROCEDURE — 710N000012 HC RECOVERY PHASE 2, PER MINUTE: Performed by: INTERNAL MEDICINE

## 2024-11-08 RX ORDER — SODIUM CHLORIDE, SODIUM LACTATE, POTASSIUM CHLORIDE, CALCIUM CHLORIDE 600; 310; 30; 20 MG/100ML; MG/100ML; MG/100ML; MG/100ML
INJECTION, SOLUTION INTRAVENOUS CONTINUOUS
Status: DISCONTINUED | OUTPATIENT
Start: 2024-11-08 | End: 2024-11-08 | Stop reason: HOSPADM

## 2024-11-08 RX ORDER — SODIUM CHLORIDE, SODIUM LACTATE, POTASSIUM CHLORIDE, CALCIUM CHLORIDE 600; 310; 30; 20 MG/100ML; MG/100ML; MG/100ML; MG/100ML
INJECTION, SOLUTION INTRAVENOUS CONTINUOUS PRN
Status: DISCONTINUED | OUTPATIENT
Start: 2024-11-08 | End: 2024-11-08

## 2024-11-08 RX ORDER — LIDOCAINE 40 MG/G
CREAM TOPICAL
Status: DISCONTINUED | OUTPATIENT
Start: 2024-11-08 | End: 2024-11-08 | Stop reason: HOSPADM

## 2024-11-08 RX ORDER — ONDANSETRON 2 MG/ML
INJECTION INTRAMUSCULAR; INTRAVENOUS PRN
Status: DISCONTINUED | OUTPATIENT
Start: 2024-11-08 | End: 2024-11-08

## 2024-11-08 RX ORDER — DEXAMETHASONE SODIUM PHOSPHATE 10 MG/ML
4 INJECTION, SOLUTION INTRAMUSCULAR; INTRAVENOUS
Status: DISCONTINUED | OUTPATIENT
Start: 2024-11-08 | End: 2024-11-08 | Stop reason: HOSPADM

## 2024-11-08 RX ORDER — LIDOCAINE HYDROCHLORIDE 10 MG/ML
INJECTION, SOLUTION INFILTRATION; PERINEURAL PRN
Status: DISCONTINUED | OUTPATIENT
Start: 2024-11-08 | End: 2024-11-08

## 2024-11-08 RX ORDER — HYDROMORPHONE HCL IN WATER/PF 6 MG/30 ML
0.2 PATIENT CONTROLLED ANALGESIA SYRINGE INTRAVENOUS EVERY 5 MIN PRN
Status: DISCONTINUED | OUTPATIENT
Start: 2024-11-08 | End: 2024-11-08 | Stop reason: HOSPADM

## 2024-11-08 RX ORDER — CIPROFLOXACIN 2 MG/ML
400 INJECTION, SOLUTION INTRAVENOUS ONCE
Status: COMPLETED | OUTPATIENT
Start: 2024-11-08 | End: 2024-11-08

## 2024-11-08 RX ORDER — FENTANYL CITRATE 50 UG/ML
25 INJECTION, SOLUTION INTRAMUSCULAR; INTRAVENOUS EVERY 5 MIN PRN
Status: DISCONTINUED | OUTPATIENT
Start: 2024-11-08 | End: 2024-11-08 | Stop reason: HOSPADM

## 2024-11-08 RX ORDER — NALOXONE HYDROCHLORIDE 0.4 MG/ML
0.1 INJECTION, SOLUTION INTRAMUSCULAR; INTRAVENOUS; SUBCUTANEOUS
Status: DISCONTINUED | OUTPATIENT
Start: 2024-11-08 | End: 2024-11-08 | Stop reason: HOSPADM

## 2024-11-08 RX ORDER — NALOXONE HYDROCHLORIDE 0.4 MG/ML
0.2 INJECTION, SOLUTION INTRAMUSCULAR; INTRAVENOUS; SUBCUTANEOUS
Status: CANCELLED | OUTPATIENT
Start: 2024-11-08

## 2024-11-08 RX ORDER — ONDANSETRON 2 MG/ML
4 INJECTION INTRAMUSCULAR; INTRAVENOUS EVERY 6 HOURS PRN
Status: CANCELLED | OUTPATIENT
Start: 2024-11-08

## 2024-11-08 RX ORDER — ONDANSETRON 4 MG/1
4 TABLET, ORALLY DISINTEGRATING ORAL EVERY 30 MIN PRN
Status: DISCONTINUED | OUTPATIENT
Start: 2024-11-08 | End: 2024-11-08 | Stop reason: HOSPADM

## 2024-11-08 RX ORDER — OXYCODONE HYDROCHLORIDE 5 MG/1
10 TABLET ORAL
Status: DISCONTINUED | OUTPATIENT
Start: 2024-11-08 | End: 2024-11-08 | Stop reason: HOSPADM

## 2024-11-08 RX ORDER — NALOXONE HYDROCHLORIDE 0.4 MG/ML
0.4 INJECTION, SOLUTION INTRAMUSCULAR; INTRAVENOUS; SUBCUTANEOUS
Status: CANCELLED | OUTPATIENT
Start: 2024-11-08

## 2024-11-08 RX ORDER — CIPROFLOXACIN 500 MG/1
500 TABLET, FILM COATED ORAL 2 TIMES DAILY
Qty: 10 TABLET | Refills: 0 | Status: SHIPPED | OUTPATIENT
Start: 2024-11-09 | End: 2024-11-14

## 2024-11-08 RX ORDER — FLUMAZENIL 0.1 MG/ML
0.2 INJECTION, SOLUTION INTRAVENOUS
Status: CANCELLED | OUTPATIENT
Start: 2024-11-08 | End: 2024-11-08

## 2024-11-08 RX ORDER — FENTANYL CITRATE 50 UG/ML
25 INJECTION, SOLUTION INTRAMUSCULAR; INTRAVENOUS
Status: DISCONTINUED | OUTPATIENT
Start: 2024-11-08 | End: 2024-11-08 | Stop reason: HOSPADM

## 2024-11-08 RX ORDER — PROPOFOL 10 MG/ML
INJECTION, EMULSION INTRAVENOUS CONTINUOUS PRN
Status: DISCONTINUED | OUTPATIENT
Start: 2024-11-08 | End: 2024-11-08

## 2024-11-08 RX ORDER — ACETAMINOPHEN 325 MG/1
975 TABLET ORAL ONCE
Status: DISCONTINUED | OUTPATIENT
Start: 2024-11-08 | End: 2024-11-08 | Stop reason: HOSPADM

## 2024-11-08 RX ORDER — PROPOFOL 10 MG/ML
INJECTION, EMULSION INTRAVENOUS PRN
Status: DISCONTINUED | OUTPATIENT
Start: 2024-11-08 | End: 2024-11-08

## 2024-11-08 RX ORDER — FENTANYL CITRATE 50 UG/ML
50 INJECTION, SOLUTION INTRAMUSCULAR; INTRAVENOUS EVERY 5 MIN PRN
Status: DISCONTINUED | OUTPATIENT
Start: 2024-11-08 | End: 2024-11-08 | Stop reason: HOSPADM

## 2024-11-08 RX ORDER — HYDROMORPHONE HCL IN WATER/PF 6 MG/30 ML
0.4 PATIENT CONTROLLED ANALGESIA SYRINGE INTRAVENOUS EVERY 5 MIN PRN
Status: DISCONTINUED | OUTPATIENT
Start: 2024-11-08 | End: 2024-11-08 | Stop reason: HOSPADM

## 2024-11-08 RX ORDER — OXYCODONE HYDROCHLORIDE 5 MG/1
5 TABLET ORAL
Status: DISCONTINUED | OUTPATIENT
Start: 2024-11-08 | End: 2024-11-08 | Stop reason: HOSPADM

## 2024-11-08 RX ORDER — ONDANSETRON 4 MG/1
4 TABLET, ORALLY DISINTEGRATING ORAL EVERY 6 HOURS PRN
Status: CANCELLED | OUTPATIENT
Start: 2024-11-08

## 2024-11-08 RX ORDER — ONDANSETRON 2 MG/ML
4 INJECTION INTRAMUSCULAR; INTRAVENOUS EVERY 30 MIN PRN
Status: DISCONTINUED | OUTPATIENT
Start: 2024-11-08 | End: 2024-11-08 | Stop reason: HOSPADM

## 2024-11-08 RX ORDER — GLYCOPYRROLATE 0.2 MG/ML
INJECTION, SOLUTION INTRAMUSCULAR; INTRAVENOUS PRN
Status: DISCONTINUED | OUTPATIENT
Start: 2024-11-08 | End: 2024-11-08

## 2024-11-08 RX ORDER — PROCHLORPERAZINE MALEATE 5 MG/1
5 TABLET ORAL EVERY 6 HOURS PRN
Status: CANCELLED | OUTPATIENT
Start: 2024-11-08

## 2024-11-08 RX ADMIN — PROPOFOL 100 MCG/KG/MIN: 10 INJECTION, EMULSION INTRAVENOUS at 11:22

## 2024-11-08 RX ADMIN — CIPROFLOXACIN 400 MG: 2 INJECTION, SOLUTION INTRAVENOUS at 12:12

## 2024-11-08 RX ADMIN — PROPOFOL 40 MG: 10 INJECTION, EMULSION INTRAVENOUS at 11:25

## 2024-11-08 RX ADMIN — PROPOFOL 50 MG: 10 INJECTION, EMULSION INTRAVENOUS at 11:22

## 2024-11-08 RX ADMIN — GLYCOPYRROLATE 0.2 MG: 0.2 INJECTION INTRAMUSCULAR; INTRAVENOUS at 11:21

## 2024-11-08 RX ADMIN — ONDANSETRON 4 MG: 2 INJECTION INTRAMUSCULAR; INTRAVENOUS at 11:21

## 2024-11-08 RX ADMIN — PROPOFOL 30 MG: 10 INJECTION, EMULSION INTRAVENOUS at 11:29

## 2024-11-08 RX ADMIN — DEXMEDETOMIDINE HYDROCHLORIDE 4 MCG: 100 INJECTION, SOLUTION INTRAVENOUS at 11:26

## 2024-11-08 RX ADMIN — SODIUM CHLORIDE, POTASSIUM CHLORIDE, SODIUM LACTATE AND CALCIUM CHLORIDE: 600; 310; 30; 20 INJECTION, SOLUTION INTRAVENOUS at 11:17

## 2024-11-08 RX ADMIN — LIDOCAINE HYDROCHLORIDE 5 ML: 10 INJECTION, SOLUTION INFILTRATION; PERINEURAL at 11:21

## 2024-11-08 RX ADMIN — PROPOFOL 20 MG: 10 INJECTION, EMULSION INTRAVENOUS at 11:35

## 2024-11-08 ASSESSMENT — ACTIVITIES OF DAILY LIVING (ADL)
ADLS_ACUITY_SCORE: 0

## 2024-11-08 NOTE — ANESTHESIA POSTPROCEDURE EVALUATION
Patient: Kalpana Manzo    Procedure: Procedure(s):  ENDOSCOPIC ULTRASOUND, UPPER TRACT WITH FINE NEEDLE ASPIRATION       Anesthesia Type:  MAC    Note:  Disposition: Outpatient   Postop Pain Control: Uneventful            Sign Out: Well controlled pain   PONV: No   Neuro/Psych: Uneventful            Sign Out: Acceptable/Baseline neuro status   Airway/Respiratory: Uneventful            Sign Out: Acceptable/Baseline resp. status   CV/Hemodynamics: Uneventful            Sign Out: Acceptable CV status; No obvious hypovolemia; No obvious fluid overload   Other NRE: NONE   DID A NON-ROUTINE EVENT OCCUR?            Last vitals:  Vitals Value Taken Time   /64 11/08/24 1315   Temp 36.4  C (97.5  F) 11/08/24 1315   Pulse 77 11/08/24 1326   Resp 16 11/08/24 1152   SpO2 97 % 11/08/24 1326   Vitals shown include unfiled device data.    Electronically Signed By: Rachna Oquendo MD  November 8, 2024  1:55 PM

## 2024-11-08 NOTE — ANESTHESIA PREPROCEDURE EVALUATION
Anesthesia Pre-Procedure Evaluation    Patient: Kalpana Manzo   MRN: [id number] : 1940        Procedure : Procedure(s):  ENDOSCOPIC ULTRASOUND, UPPER TRACT WITH FINE NEEDLE ASPIRATION          Past Medical History:   Diagnosis Date    Atrial thrombus 2024    Basal cell carcinoma of skin     Created by Conversion  Replacement Utility updated for latest IMO load    Chronic kidney disease, stage 3a (H)     Contact dermatitis and other eczema, due to unspecified cause     Created by Conversion     Dermatophytosis of nail     Created by Conversion     Disorder of bone and cartilage     Created by Conversion  Replacement Utility updated for latest IMO load    DVT (deep venous thrombosis) (H)     Dyslipidemia     Eczema     Hepatitis     Created by Conversion [facility] Annotation: Aug 22 2011  9:09TRAMAINE - Mela López: in the   unspecified  Replacement Utility updated for latest IMO load    Hyperlipidemia     Created by Conversion     Midline cystocele     Nephrolithiasis     Nocturia     Created by Conversion     Osteoarthrosis involving lower leg     Created by Conversion  Replacement Utility updated for latest IMO load    Osteopenia     Overactive bladder     Pancreatic mass     Prediabetes 2016    Seasonal allergic rhinitis     Swelling of limb     Created by Conversion     Varicose vein of leg       Past Surgical History:   Procedure Laterality Date    BIOPSY BREAST Left     BIOPSY BREAST Right     BIOPSY OF BREAST, INCISIONAL      Description: Biopsy Breast Open;  Recorded: 2008;    HC REMOVE TONSILS/ADENOIDS,<13 Y/O      Description: Tonsillectomy With Adenoidectomy;  Recorded: 2008;    REVISE SECONDARY VARICOSITY      Description: Varicose Vein Ligation;  Recorded: 2008;    TOTAL KNEE ARTHROPLASTY Right 2008    [facility] APPENDECTOMY      Description: Appendectomy;  Recorded: 2008;      Allergies   Allergen Reactions    Sulfamethoxazole-Trimethoprim  "Dizziness    Tetracycline Nausea      Social History     Tobacco Use    Smoking status: Never    Smokeless tobacco: Never   Substance Use Topics    Alcohol use: Yes     Comment: Alcoholic Drinks/day: rare      Wt Readings from Last 1 Encounters:   11/08/24 63.2 kg (139 lb 6.4 oz)        Anesthesia Evaluation            ROS/MED HX  ENT/Pulmonary:  - neg pulmonary ROS     Neurologic:  - neg neurologic ROS     Cardiovascular:  - neg cardiovascular ROS     METS/Exercise Tolerance:     Hematologic:  - neg hematologic  ROS     Musculoskeletal:   (+)  arthritis,             GI/Hepatic: Comment: Pancreatic mass      Renal/Genitourinary:     (+) renal disease, type: CRI,            Endo:  - neg endo ROS     Psychiatric/Substance Use:       Infectious Disease:       Malignancy:       Other:            Physical Exam    Airway  airway exam normal      Mallampati: II   TM distance: > 3 FB   Neck ROM: full   Mouth opening: > 3 cm    Respiratory Devices and Support         Dental       (+) Modest Abnormalities - crowns, retainers, 1 or 2 missing teeth      Cardiovascular   cardiovascular exam normal          Pulmonary   pulmonary exam normal                OUTSIDE LABS:  CBC:   Lab Results   Component Value Date    WBC 5.7 09/13/2024    WBC 9.9 09/05/2024    HGB 11.6 (L) 09/13/2024    HGB 11.3 (L) 09/05/2024    HCT 36.1 09/13/2024    HCT 35.9 09/05/2024     09/13/2024     09/05/2024     BMP:   Lab Results   Component Value Date     09/23/2024     (H) 09/13/2024    POTASSIUM 4.2 09/23/2024    POTASSIUM 4.0 09/13/2024    CHLORIDE 107 09/23/2024    CHLORIDE 106 09/13/2024    CO2 26 09/23/2024    CO2 29 09/13/2024    BUN 14.3 09/23/2024    BUN 17.8 09/13/2024    CR 1.18 (H) 09/23/2024    CR 1.18 (H) 09/13/2024    GLC 82 09/23/2024     (H) 09/13/2024     COAGS:   Lab Results   Component Value Date    PTT 32 09/13/2024    INR 1.53 (H) 09/13/2024     POC: No results found for: \"BGM\", \"HCG\", " "\"HCGS\"  HEPATIC:   Lab Results   Component Value Date    ALBUMIN 3.8 09/23/2024    PROTTOTAL 6.8 09/23/2024    ALT 6 09/23/2024    AST 22 09/23/2024    ALKPHOS 74 09/23/2024    BILITOTAL 0.5 09/23/2024     OTHER:   Lab Results   Component Value Date    LACT 1.5 08/23/2024    A1C 5.3 09/23/2024    DAVID 8.9 09/23/2024    PHOS 2.9 09/05/2024    MAG 2.0 08/23/2024    LIPASE 22 08/22/2024    TSH 3.13 08/22/2024       Anesthesia Plan    ASA Status:  2    NPO Status:  NPO Appropriate    Anesthesia Type: MAC.     - Reason for MAC: immobility needed, straight local not clinically adequate              Consents    Anesthesia Plan(s) and associated risks, benefits, and realistic alternatives discussed. Questions answered and patient/representative(s) expressed understanding.     - Discussed:     - Discussed with:  Patient      - Extended Intubation/Ventilatory Support Discussed: No.      - Patient is DNR/DNI Status: No          Postoperative Care    Pain management: Multi-modal analgesia.        Comments:               Rachna Oquendo MD    I have reviewed the pertinent notes and labs in the chart from the past 30 days and (re)examined the patient.  Any updates or changes from those notes are reflected in this note.            # Drug Induced Coagulation Defect: home medication list includes an anticoagulant medication                  # Financial/Environmental Concerns:          "

## 2024-11-08 NOTE — ANESTHESIA CARE TRANSFER NOTE
Patient: Kalpana Manzo    Procedure: Procedure(s):  ENDOSCOPIC ULTRASOUND, UPPER TRACT WITH FINE NEEDLE ASPIRATION       Diagnosis: Pancreatic mass [K86.89]  Diagnosis Additional Information: No value filed.    Anesthesia Type:   MAC     Note:    Oropharynx: oropharynx clear of all foreign objects  Level of Consciousness: awake  Oxygen Supplementation: room air    Independent Airway: airway patency satisfactory and stable  Dentition: dentition unchanged  Vital Signs Stable: post-procedure vital signs reviewed and stable  Report to RN Given: handoff report given  Patient transferred to: Phase II    Handoff Report: Identifed the Patient, Identified the Reponsible Provider, Reviewed the pertinent medical history, Discussed the surgical course, Reviewed Intra-OP anesthesia mangement and issues during anesthesia, Set expectations for post-procedure period and Allowed opportunity for questions and acknowledgement of understanding      Vitals:  Vitals Value Taken Time   BP     Temp 35.8  C (96.44  F) 11/08/24 1154   Pulse 82 11/08/24 1154   Resp     SpO2 98 % 11/08/24 1154   Vitals shown include unfiled device data.    Electronically Signed By: FRANCISCO JAVIER Amezcua CRNA  November 8, 2024  11:55 AM

## 2024-11-08 NOTE — H&P
GENERAL PRE-PROCEDURE:   Procedure:  EUS - Pancreatic Cyst Lesion  Date/Time:  11/8/2024 9:57 AM    Verbal consent obtained?: Yes    Written consent obtained?: Yes    Risks and benefits: Risks, benefits and alternatives were discussed    Consent given by:  Patient  Patient states understanding of procedure being performed: Yes    Patient's understanding of procedure matches consent: Yes    Procedure consent matches procedure scheduled: Yes    Expected level of sedation:  Deep  Appropriately NPO:  Yes  ASA Class:  3  Mallampati  :  Grade 2- soft palate, base of uvula, tonsillar pillars, and portion of posterior pharyngeal wall visible  Lungs:  Lungs clear with good breath sounds bilaterally  Heart:  Normal heart sounds and rate and systolic murmur  History & Physical reviewed:  History and physical reviewed and no updates needed  Statement of review:  I have reviewed the lab findings, diagnostic data, medications, and the plan for sedation

## 2024-11-12 ENCOUNTER — TELEPHONE (OUTPATIENT)
Dept: FAMILY MEDICINE | Facility: CLINIC | Age: 84
End: 2024-11-12
Payer: COMMERCIAL

## 2024-11-12 LAB
PATH REPORT.COMMENTS IMP SPEC: NORMAL
PATH REPORT.FINAL DX SPEC: NORMAL
PATH REPORT.GROSS SPEC: NORMAL
PATH REPORT.MICROSCOPIC SPEC OTHER STN: NORMAL

## 2024-11-12 NOTE — TELEPHONE ENCOUNTER
I tried giving Kalpana a call just now; went to voicemail as did a call to Adebayo's phone.     However, very pleased to confirm that the pathologist was negative for any cancer! She has what they are diagnosing as a pancreatic lesion that does have a potential to become malignant, but rarely does. However, she will need an annual imaging test going forward to monitor this lesion.

## 2024-11-12 NOTE — TELEPHONE ENCOUNTER
Test Results    Contacts       Contact Date/Time Type Contact Phone/Fax    11/12/2024 11:47 AM CST Phone (Incoming) Kalpana Manzo (Self) [phone] (M)            Who ordered the test:  Dr stark    Type of test: Upper endoscopy    Date of test:  11/8/24    Where was the test performed:  [facility]    What are your questions/concerns?:  Requesting that Dr Stark review results and confirm that the results are normal.     Could we [facility] this [facility] you [facility] or would you prefer to receive a phone call?:   Patient would prefer a phone call   Okay to leave a detailed message?: Yes at Cell number on file:    Telephone Information:   Mobile [phone]

## 2024-11-13 NOTE — TELEPHONE ENCOUNTER
Patient returning call - provider message below relayed and patient verbalized understanding. No additional questions at time of call

## 2024-11-18 ENCOUNTER — PATIENT OUTREACH (OUTPATIENT)
Dept: CARE COORDINATION | Facility: CLINIC | Age: 84
End: 2024-11-18
Payer: COMMERCIAL

## 2024-11-18 NOTE — PROGRESS NOTES
Clinic Care Coordination Contact  Community Health Worker Follow Up    Care Gaps:     There are no preventive care reminders to display for this patient.    Currently there are no Care Gaps.    Care Plan:   Care Plan: Appointments/Referrals       Problem: HP GENERAL PROBLEM       Goal: I would like to attend the following appointments.       Start Date: 10/18/2024    This Visit's Progress: 30%    Note:     Barriers: numerous appointments  Strengths: motivated  Patient expressed understanding of goal: yes  Action steps to achieve this goal:    1. 11/8 fine needle aspiration of pancreatic mass. Completed  2.  [facility] follow up after procedure. 11/27 Follow up with Urology. Gastro let me know that my follow up with Urology would be sufficient for follow up.   3. 11/25 @ 8:45 xray & 11/27 @ 9:15 [facility]. Continuous (MB)                                   Intervention and Education during outreach: See goal for details. Patient is hoping to [place] for the winter for 6 months, but that will depend on how patients appointments go on 11/15/ and 11/27. No other needs at this time.    CHW Plan: CHW will follow up with patient 1 month.     Ksenia Herbert  Community Health Worker  [facility] Care Coordination   [place], [facility], [place], [place], [facility]  Office: [phone]

## 2024-11-18 NOTE — PROGRESS NOTES
Clinic Care Coordination Contact  [facility]/Voicemail    Clinical Data: Care Coordinator Outreach    Outreach Documentation Number of Outreach Attempt   11/18/2024  10:14 AM 1     Left message on patient's voicemail with call back information and requested return call.    Plan: Care Coordinator will try to reach patient again in 10 business days.    Ksenia Herbert  [facility] Health Worker  [facility] Care Coordination   [place], [facility], [facility]  Office: [phone]

## 2024-11-25 ENCOUNTER — ANCILLARY PROCEDURE (OUTPATIENT)
Dept: GENERAL RADIOLOGY | Facility: CLINIC | Age: 84
End: 2024-11-25
Attending: STUDENT IN AN ORGANIZED HEALTH CARE EDUCATION/TRAINING PROGRAM
Payer: COMMERCIAL

## 2024-11-25 DIAGNOSIS — N20.0 NEPHROLITHIASIS: ICD-10-CM

## 2024-11-25 PROCEDURE — 74018 RADEX ABDOMEN 1 VIEW: CPT | Mod: TC | Performed by: RADIOLOGY

## 2024-11-27 ENCOUNTER — OFFICE VISIT (OUTPATIENT)
Dept: UROLOGY | Facility: CLINIC | Age: 84
End: 2024-11-27
Payer: COMMERCIAL

## 2024-11-27 VITALS — SYSTOLIC BLOOD PRESSURE: 153 MMHG | TEMPERATURE: 97.1 F | DIASTOLIC BLOOD PRESSURE: 72 MMHG | HEART RATE: 80 BPM

## 2024-11-27 DIAGNOSIS — N20.0 NEPHROLITHIASIS: ICD-10-CM

## 2024-11-27 PROCEDURE — 99214 OFFICE O/P EST MOD 30 MIN: CPT | Performed by: NURSE PRACTITIONER

## 2024-11-27 NOTE — PATIENT INSTRUCTIONS
UROLOGY CLINIC VISIT PATIENT INSTRUCTIONS    -Procedure: Ureteroscopy and laser lithotripsy. Video: [email or web address]    If you have any issues, questions or concerns in the meantime, do not hesitate to contact us at [facility] at [phone] or via [employer].     Silvia Shaikh CNP  Department of Urology     DIET & LIFESTYLE CHANGES FOR PATIENTS WITH KIDNEY STONES    If you've had a kidney stone, you are likely to form another. Risk of recurrence is 15% at 1 year, 35% to 40% at 2 years, and 50% at 10 years. Therefore, prevention is very important.  These recommendations have shown to be effective.    CALCIUM STONES (Oxalate and Phosphate)    Fluid intake is the most important prevention measure to help prevent stones. Fluid intake should be at least 2.5 liters per day or 90-120oz per day. With goal of urine output of >2.5L per day.   Increasing liquids that have citric acid may help such as low calorie orange juice, lemonade (Crystal Light Lemonade or True Lemon/Lime), or adding a citrus to your water.  You can add lemon juice or fresh celeste to your water daily.  Lemon juice increases the citrate in your urine, and helps decrease the formation of stone and even breakdown certain types of stones. Add a cap full/teaspoon of pure lemon juice to each glass.   Try to limit sugar, especially if you have diabetes.    Helpful Fluid Measurements:  1 liter = 34oz  1 quart = 32 oz  24 pack water: Each bottle 16.9 oz     Low Oxalate Diet: Limit your consumption of OXALATE-rich foods including:  All nuts and nut products including peanuts, almonds,peanut butter, almond milk  Spinach  Rhubarb  Beets  Chocolate  Soybeans and soy products       Low Sodium Diet: Salt (sodium chloride) is found in abundance in many foods. High sodium levels in the urine can interfere with the kidney's  handling of calcium.   Trying a DASH (Dietary Approaches to Stop Hypertension) diet which is eating more fruits and vegetables, limiting salt intake, moderate in low-fat diary products, and low in animal protein.   Try to decrease salt intake to <2000 mg of sodium daily.     Tips for reducing the salt in your diet:  Don't use salt at the table  Reduce the salt used in food preparation. Try 1/2 teaspoon when recipes call for 1 teaspoon.  Use herbs and spices for flavoring instead of salt.  Avoid salty foods.  Check the label before you buy or use a product. Note sodium and portion size information.  Try to consume less than 2,000 mg/day. (1 teaspoon = 2,000 mg)    Foods with high sodium content include:  Processed meat (including luncheon meats, sausage)   Crackers   Instant cereal   Processed cheese   Canned soups   Chips and snack foods   Soy sauce    Low Animal Protein: Reduce animal protein (meat) intake to no more than 8-10 ounces per day. Increase fruit and vegetables to 5 servings per day.    Maintain a normal calcium diet: Calcium rich foods are encouraged, but no more than 1000 - 1200 mg per day. Researches have found that people with low calcium intakes tend to have more stones. Foods with high calcium content are acceptable and include:  Calcium rich foods include:   Diary (cheese, milk, and yogurt)  Enriched cereals  Meat and fish  Dark green vegetables    Limit Vitamin C intake to < 1000 mg daily.

## 2024-11-27 NOTE — PROGRESS NOTES
Urology Office Visit           Assessment and Plan:     Assessment:84 year old female with a left 11mm nonobstructing lower pole stone.     Plan:  -Reviewed KUB with patient. Noted stable left nonobstructing lower pole stone. Discussed that stone too large to pass spontaneously. Discussed option of a definitive stone procedure. Will hold off at this time and re-evaluate in summer of 2025.  -Reviewed general measures to prevent recurrent kidney stones.  These include fluid intake to achieve 2.5 liters of urine per day, decreased salt intake, normal calcium intake, lowering animal protein intake, avoiding high amounts of oxalate containing foods, and increased citrate intake with orange juice/lemonade.  -If having severe flank pain, fevers, chills, nausea, or vomiting please notify Urology clinic or be seen in the ER.   -RTC in early June of 2025 with CT scan.     Silvia Shaikh CNP  Department of Urology  November 27, 2024    I spent a total of 30 minutes spent on the date of the encounter doing chart review, history and exam, documentation, and further activities as noted above.          Chief Complaint:   Left Renal Stone         History of Present Illness:    Kalpana Manzo is a pleasant 84 year old female who presents with her  for follow up of a left nonobstructing renal stone.     Ms. Manzo was last seen with Urology on 09/17/24 for concerns of a nonobstructing left lower pole stone.     She has been doing well since she was last seen. Denies any left flank pain, gross hematuria, or concerns of Jhon's.     KUB on 11/25/24 (images personally reviewed) revealed stable left lower pole stone. On KUB stone is measuring about 11mm in size.     She was initially seen in the ER on 09/13/24 for concerns of gross hematuria. CT scan on 09/13/24 (images personally reviewed) revealed stable  left lower pole stone. Noted right 2mm renal stone which was seen on CT scan on 08/22/4 is no longer seen on imaging.      She is currently on estradiol cream to help with Jhon's. .      She was recently admitted from 08/22-08/24/24 for urosepsis s/s to UTI. Urine culture revealed mixed urogenital lloyd however blood cultures positive for e.Coli. CT scan on 08/22/24 (images personally reviewed) revealed a right 2mm nonobstructing renal stone and left 1.6cm left nonobstructing lower pole stone. She continues to be asymptomatic.     This is her first stone. Denies any family history of nephrolithiasis.      Stone Risk Factors: none     She is currently on Eliquis at this time.      She and her  do winter in [place]. They leave in Nov 1st 2024 and return May 1st.             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    Basal cell carcinoma of skin     Created by Conversion  Replacement Utility updated for latest IMO load    Chronic kidney disease, stage 3a (H)     Contact dermatitis and other eczema, due to unspecified cause     Created by Conversion     Dermatophytosis of nail     Created by Conversion     Disorder of bone and cartilage     Created by Conversion  Replacement Utility updated for latest IMO load    DVT (deep venous thrombosis) (H)     Dyslipidemia     Eczema     Hepatitis     Created by Conversion [facility] Annotation: Aug 22 2011  9:09AM - Mela López: in the 1980's  unspecified  Replacement Utility updated for latest IMO load    Hyperlipidemia     Created by Conversion     Midline cystocele     Nephrolithiasis     Nocturia     Created by Conversion     Osteoarthrosis involving lower leg     Created by Conversion  Replacement Utility updated for latest IMO load    Osteopenia     Overactive bladder     Pancreatic mass     Prediabetes 07/18/2016    Seasonal allergic rhinitis     Swelling of limb     Created by Conversion     Varicose vein of leg             Past Surgical History:     Past Surgical History:   Procedure Laterality Date    BIOPSY BREAST Left  1987    BIOPSY BREAST Right 1992    BIOPSY OF BREAST, INCISIONAL      Description: Biopsy Breast Open;  Recorded: 05/19/2008;    ESOPHAGOSCOPY, GASTROSCOPY, DUODENOSCOPY (EGD), COMBINED N/A 11/8/2024    Procedure: ENDOSCOPIC ULTRASOUND, UPPER TRACT WITH FINE NEEDLE ASPIRATION;  Surgeon: Pavel Aldana MD;  Location: [facility] OR     REMOVE TONSILS/ADENOIDS,<11 Y/O      Description: Tonsillectomy With Adenoidectomy;  Recorded: 05/19/2008;    REVISE SECONDARY VARICOSITY      Description: Varicose Vein Ligation;  Recorded: 05/19/2008;    TOTAL KNEE ARTHROPLASTY Right 08/2008    Z APPENDECTOMY      Description: Appendectomy;  Recorded: 05/19/2008;            Medications     Current Outpatient Medications   Medication Sig Dispense Refill    acetaminophen (TYLENOL) 500 MG tablet Take 500-1,000 mg by mouth every 6 hours as needed for mild pain      apixaban ANTICOAGULANT (ELIQUIS) 5 MG tablet Take 1 tablet (5 mg) by mouth 2 times daily 180 tablet 3    cholecalciferol, vitamin D3, (VITAMIN D3) 2,000 unit Tab Take 1,000 Units by mouth daily      GLUCOSAM HCL/CHONDRO MONTES A/C/MN (GLUCOSAMINE-CHONDROITIN COMPLX ORAL) [GLUCOSAM HCL/CHONDRO MONTES A/C/MN (GLUCOSAMINE-CHONDROITIN COMPLX ORAL)] Take 1 tablet by mouth daily. Tablet      multivitamin capsule [MULTIVITAMIN CAPSULE] Take 1 capsule by mouth daily.      simvastatin (ZOCOR) 10 MG tablet TAKE 1 TABLET (10 MG) BY MOUTH DAILY. 90 tablet 1     No current facility-administered medications for this visit.            Family History:     Family History   Problem Relation Age of Onset    Varicose Veins Mother             Social History:     Social History     Socioeconomic History    Marital status:      Spouse name: Not on file    Number of children: Not on file    Years of education: Not on file    Highest education level: Not on file   Occupational History    Not on file   Tobacco Use    Smoking status: Never    Smokeless tobacco: Never   Vaping Use    Vaping  status: Never Used   Substance and Sexual Activity    Alcohol use: Yes     Comment: Alcoholic Drinks/day: rare    Drug use: No    Sexual activity: Not on file   Other Topics Concern    Not on file   Social History Narrative    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      Social Drivers of Health     Financial Resource Strain: Low Risk  (9/23/2024)    Financial Resource Strain     Within the past 12 months, have you or your family members you live with been unable to get utilities (heat, electricity) when it was really needed?: No   Food Insecurity: Low Risk  (9/23/2024)    Food Insecurity     Within the past 12 months, did you worry that your food would run out before you got money to buy more?: No     Within the past 12 months, did the food you bought just not last and you didn t have money to get more?: No   Transportation Needs: Low Risk  (9/23/2024)    Transportation Needs     Within the past 12 months, has lack of transportation kept you from medical appointments, getting your medicines, non-medical meetings or appointments, work, or from getting things that you need?: No   Physical Activity: Unknown (9/23/2024)    Exercise Vital Sign     Days of Exercise per Week: 2 days     Minutes of Exercise per Session: Not on file   Stress: No Stress Concern Present (9/23/2024)    [employer] [place] [employer] - Occupational Stress Questionnaire     Feeling of Stress : Only a little   Social Connections: Unknown (9/23/2024)    Social Connection and Isolation Panel [NHANES]     Frequency of Communication with Friends and Family: Not on file     Frequency of Social Gatherings with Friends and Family: Twice a week     Attends [employer] Services: Not on file     Active Member of Clubs or Organizations: Not on file     Attends Club or Organization Meetings: Not on file     Marital Status: Not on file   Interpersonal Safety: High Risk (11/8/2024)    Interpersonal Safety     Do you feel physically and emotionally safe where you  currently live?: No     Within the past 12 months, have you been hit, slapped, kicked or otherwise physically hurt by someone?: No     Within the past 12 months, have you been humiliated or emotionally abused in other ways by your partner or ex-partner?: No   Housing Stability: Low Risk  (9/23/2024)    Housing Stability     Do you have housing? : Yes     Are you worried about losing your housing?: No            Allergies:   Sulfamethoxazole-trimethoprim and Tetracycline         Review of Systems:  From intake questionnaire   Negative 14 system review except as noted on HPI, nurse's note.         Physical Exam:   General Appearance: Well groomed, hygenic  Eyes: No redness, discharge  Respiratory: No cough, no respiratory distress or labored breathing  Musculoskeletal: Grossly normal, full range of motion in upper extremities, no gross deficits  Skin: No discoloration or apparent rashes  Neurologic - No tremors  Psychiatric - Alert and oriented      Labs:    I personally reviewed all applicable laboratory data and went over findings with patient  Significant for:    CBC RESULTS:  Recent Labs   Lab Test 09/13/24  1734 09/05/24  1638 08/23/24  0632 08/23/24  0126   WBC 5.7 9.9 13.1* 14.0*   HGB 11.6* 11.3* 10.7* 11.1*    324 131* 140*        BMP RESULTS:  Recent Labs   Lab Test 09/23/24  0957 09/13/24  1734 09/05/24  1638 08/29/24  0916 08/30/21  1010 07/06/20  0913 08/12/19  0842 08/06/18  0842    147* 143 145   < > 143 145 144   POTASSIUM 4.2 4.0 3.5 3.4   < > 4.1 4.0 4.1   CHLORIDE 107 106 107 109*   < > 106 108* 108*   CO2 26 29 23 21*   < > 27 31 27   ANIONGAP 9 12 13 15   < > 10 6 9   GLC 82 100* 92 145*   < > 90 93 88   BUN 14.3 17.8 16.2 16.2   < > 23 19 19   CR 1.18* 1.18* 1.13* 1.15*   < > 0.96 0.93 0.88   GFRESTIMATED 45* 45* 48* 47*   < > 56* 58* >60   GFRESTBLACK  --   --   --   --   --  >60 >60 >60   DAVID 8.9 8.8 8.8 8.1*   < > 9.1 9.8 9.6    < > = values in this interval not displayed.        UA RESULTS:   Recent Labs   Lab Test 10/18/24  0749 09/13/24  1637 09/06/24  0600   SG 1.011 1.011 1.015   URINEPH 7.0 7.0 7.0   NITRITE Negative Negative Negative   RBCU >182* >182* 10-25*   WBCU 57* 46* 5-10*       CALCIUM RESULTS  Lab Results   Component Value Date    DAVID 8.9 09/23/2024    DAVID 8.8 09/13/2024    DAVID 8.8 09/05/2024           Imaging:    I personally reviewed all applicable imaging and went over the below findings with patient.    Results for orders placed or performed in visit on 11/25/24   XR KUB    Narrative    EXAM: XR KUB  LOCATION: [facility]  DATE: 11/25/2024    INDICATION:  Nephrolithiasis  COMPARISON: CT AP 7/3/2024 and older studies      Impression    IMPRESSION: A 1.1 x 0.6 cm calyceal stone again seen overlying the lower pole of the left kidney. No other calculi    Redundant colon with a moderate stool burden. Lumbar rotoscoliosis convex to the right centered at L2-3.       EXAM: CT ABDOMEN PELVIS W/O CONTRAST  LOCATION: [facility]  DATE: 9/13/2024     INDICATION: Hematuria. Known nephrolithiasis and recent urosepsis.  COMPARISON: CT chest abdomen pelvis 7/3/2024.  TECHNIQUE: CT scan of the abdomen and pelvis was performed without IV contrast. Multiplanar reformats were obtained. Dose reduction techniques were used.  CONTRAST: None.     FINDINGS:   LOWER CHEST: Mild linear scarring or atelectasis in the lower lungs.     HEPATOBILIARY: Cholelithiasis. Normal noncontrast liver.     PANCREAS: Ill-defined heterogeneity in the pancreatic tail, poorly characterized on this noncontrast study as compared to 7/3/2024. Appearance is grossly unchanged. No peripancreatic inflammatory change or fluid collection.     SPLEEN: Normal.     ADRENAL GLANDS: Normal.     KIDNEYS/BLADDER: Nonobstructing 7 x 9 mm stone lower left kidney. No ureteral stone or hydronephrosis. No perinephric stranding or fluid collection. Normal bladder, no stone.     BOWEL:  Moderate sigmoid colonic diverticulosis. No acute findings. No evidence of diverticulitis or colitis. No evidence of bowel obstruction. Large volume stool scattered throughout the colon. No free air, free fluid or abscess.     LYMPH NODES: Normal.     VASCULATURE: Moderate atherosclerotic calcifications. Normal caliber abdominal aorta.     PELVIC ORGANS: Normal.     MUSCULOSKELETAL: Mild thoracolumbar curve. Moderate scattered degenerative changes in the spine. No suspicious osseous lesion. Old right rib fractures.                                                                      IMPRESSION:   1. No acute inflammatory findings in the abdomen or pelvis.  2. Unchanged nonobstructing stone lower left kidney. No ureteral stone or hydronephrosis.  3. Large volume stool throughout the colon.

## 2024-12-16 ENCOUNTER — PATIENT OUTREACH (OUTPATIENT)
Dept: CARE COORDINATION | Facility: CLINIC | Age: 84
End: 2024-12-16
Payer: COMMERCIAL

## 2024-12-16 NOTE — PROGRESS NOTES
Clinic Care Coordination Contact  Care Coordination Clinician Chart Review    Situation: Patient chart reviewed by Care Coordinator.       Background: Care Coordination Program started: 10/16/2024. Initial assessment completed and patient-centered care plan(s) were developed with participation from patient. Lead CC handed patient off to CHW for continued outreaches.       Assessment: Per chart review, patient outreach completed by CC CHW on 11/18/24.  Patient is actively working to accomplish goal(s). Patient's goal(s) appropriate and relevant at this time. Patient is not due for updated Plan of Care.  Assessments will be completed annually or as needed/with change of patient status.      Care Plan: Appointments/Referrals       Problem: HP GENERAL PROBLEM       Goal: I would like to attend the following appointments.       Start Date: 10/18/2024    Recent Progress: 30%    Note:     Barriers: numerous appointments  Strengths: motivated  Patient expressed understanding of goal: yes  Action steps to achieve this goal:    1. 11/8 fine needle aspiration of pancreatic mass. Completed  2.  [facility] for [facility] follow up after procedure. 11/27 Follow up with Urology. Gastro let me know that my follow up with Urology would be sufficient for follow up. -completed  3. 11/25 @ 8:45 xray & 11/27 @ 9:15 [facility]. Completed    She and her  do winter in [place]. They leave in Dec 2024 and return May 1st.  Please inquire about when they are leaving.  Will transition to Graduation if no other needs.                                   Plan/Recommendations: The patient will continue working with Care Coordination to achieve goal(s) as above. CHW will continue outreaches at minimum every 30 days and will involve Lead CC as needed or if patient is ready to move to Maintenance. Lead CC will continue to monitor CHW outreaches and patient's progress to goal(s) every 6 weeks.     Plan of Care updated and sent to  patient: No

## 2024-12-19 ENCOUNTER — PATIENT OUTREACH (OUTPATIENT)
Dept: CARE COORDINATION | Facility: CLINIC | Age: 84
End: 2024-12-19
Payer: COMMERCIAL

## 2024-12-19 NOTE — PROGRESS NOTES
Clinic Care Coordination Contact  Community Health Worker Follow Up    Care Gaps:     There are no preventive care reminders to display for this patient.    Currently there are no Care Gaps.    Care Plan:   Care Plan: Appointments/Referrals       Problem: HP GENERAL PROBLEM       Goal: I have accomplished following up with needed appointments  Completed 12/19/2024      Start Date: 10/18/2024    This Visit's Progress: 100% Recent Progress: 30%    Note:     Personal Plan  If I have any questions about my health I will call my PCP or Specialists.                                 Intervention and Education during outreach: Patient has completed goal regarding following up with Specialists. Patient is in [place] now until May 2025. No other needs currently. Patient comfortable with Graduating from [facility] at this time.     CHW Plan: Patient has no other goals that this patient would like to work on with Clinic Care Coordination. CHW sent request to [facility] RN pool to review for Graduation.     Ksenia Herbert  Community Health Worker  [facility] Care Coordination   [place], [facility], [facility], [facility]  Office: [phone]      EXAM DATE/TIME:  01/30/2018 13:22 

 

[facility] COMPARISON:     

No previous studies available for comparison.

 

                     

INDICATIONS :     

Cough and congestion for 2 days.

                     

 

MEDICAL HISTORY :     

Carcinoma, breast.          

 

SURGICAL HISTORY :     

Mastectomy, bilateral.   

 

ENCOUNTER:     

Initial                                        

 

ACUITY:     

2 days      

 

PAIN SCORE:     

2/10

 

LOCATION:     

Bilateral chest 

 

FINDINGS:     

There is an indwelling right subclavian venous catheter Wdeeuc-w-Eckk. Spurring is noted of the mid l
ower thoracic spine with intact bony structures. Surgical clips are noted in the bilateral axilla wit
h some indistinctness of the right breast tissue. This may represent reconstruction. The heart, aorta
, and vascularity are normal with clear lung fields.

 

 

CONCLUSION:     No acute disease.  

 

 

 

 Padilla Tong MD on January 30, 2018 at 13:36           

Board Certified Radiologist.

 This report was verified electronically.

## 2024-12-19 NOTE — PROGRESS NOTES
Clinic Care Coordination Contact    Assessment: Care Coordinator contacted patient for follow up.  Patient has continued to follow the plan of care and assessment is negative for any new needs or concerns.    Enrollment status: Graduated.      Plan: No further outreaches at this time.  Patient will continue to follow the plan of care.  If new needs arise a new Care Coordination referral may be placed.  FYI to PCP

## 2024-12-26 ENCOUNTER — TELEPHONE (OUTPATIENT)
Dept: FAMILY MEDICINE | Facility: CLINIC | Age: 84
End: 2024-12-26
Payer: COMMERCIAL

## 2024-12-26 NOTE — TELEPHONE ENCOUNTER
Incoming call from patient and son who state patient is experiencing acute, right knee pain while in [place]. They are wondering if patient can get a stronger pain medication for this. Advised patient to be seen somewhere local to them such as urgent care to be evaluated as Dr. López would not be able to treat a condition for which she has not seen. Provided some OTC pain options such as pain relief gel/cream, tylenol, heat, etc.     Patient and son verbalized understanding.

## 2025-03-16 ENCOUNTER — HEALTH MAINTENANCE LETTER (OUTPATIENT)
Age: 85
End: 2025-03-16

## 2025-05-19 ENCOUNTER — OFFICE VISIT (OUTPATIENT)
Dept: FAMILY MEDICINE | Facility: CLINIC | Age: 85
End: 2025-05-19
Payer: COMMERCIAL

## 2025-05-19 VITALS
TEMPERATURE: 97.4 F | BODY MASS INDEX: 21.69 KG/M2 | HEIGHT: 66 IN | RESPIRATION RATE: 12 BRPM | OXYGEN SATURATION: 99 % | HEART RATE: 67 BPM | WEIGHT: 135 LBS | SYSTOLIC BLOOD PRESSURE: 134 MMHG | DIASTOLIC BLOOD PRESSURE: 76 MMHG

## 2025-05-19 DIAGNOSIS — I82.512 CHRONIC DEEP VEIN THROMBOSIS (DVT) OF FEMORAL VEIN OF LEFT LOWER EXTREMITY (H): ICD-10-CM

## 2025-05-19 DIAGNOSIS — N20.0 NEPHROLITHIASIS: ICD-10-CM

## 2025-05-19 DIAGNOSIS — K86.89 PANCREATIC MASS: ICD-10-CM

## 2025-05-19 DIAGNOSIS — R04.0 EPISTAXIS: ICD-10-CM

## 2025-05-19 DIAGNOSIS — N18.31 CHRONIC KIDNEY DISEASE, STAGE 3A (H): Primary | ICD-10-CM

## 2025-05-19 PROBLEM — R31.9 HEMATURIA, UNSPECIFIED TYPE: Status: RESOLVED | Noted: 2024-09-06 | Resolved: 2025-05-19

## 2025-05-19 PROBLEM — N39.0 COMPLICATED UTI (URINARY TRACT INFECTION): Status: RESOLVED | Noted: 2024-08-22 | Resolved: 2025-05-19

## 2025-05-19 LAB
ALBUMIN SERPL BCG-MCNC: 4.2 G/DL (ref 3.5–5.2)
ANION GAP SERPL CALCULATED.3IONS-SCNC: 11 MMOL/L (ref 7–15)
BUN SERPL-MCNC: 27.7 MG/DL (ref 8–23)
CALCIUM SERPL-MCNC: 9.6 MG/DL (ref 8.8–10.4)
CHLORIDE SERPL-SCNC: 108 MMOL/L (ref 98–107)
CREAT SERPL-MCNC: 1.16 MG/DL (ref 0.51–0.95)
EGFRCR SERPLBLD CKD-EPI 2021: 46 ML/MIN/1.73M2
ERYTHROCYTE [DISTWIDTH] IN BLOOD BY AUTOMATED COUNT: 13.2 % (ref 10–15)
GLUCOSE SERPL-MCNC: 95 MG/DL (ref 70–99)
HCO3 SERPL-SCNC: 24 MMOL/L (ref 22–29)
HCT VFR BLD AUTO: 38.5 % (ref 35–47)
HGB BLD-MCNC: 12.4 G/DL (ref 11.7–15.7)
MCH RBC QN AUTO: 30.5 PG (ref 26.5–33)
MCHC RBC AUTO-ENTMCNC: 32.2 G/DL (ref 31.5–36.5)
MCV RBC AUTO: 95 FL (ref 78–100)
PHOSPHATE SERPL-MCNC: 3.3 MG/DL (ref 2.5–4.5)
PLATELET # BLD AUTO: 177 10E3/UL (ref 150–450)
POTASSIUM SERPL-SCNC: 4.1 MMOL/L (ref 3.4–5.3)
RBC # BLD AUTO: 4.06 10E6/UL (ref 3.8–5.2)
SODIUM SERPL-SCNC: 143 MMOL/L (ref 135–145)
WBC # BLD AUTO: 6.2 10E3/UL (ref 4–11)

## 2025-05-19 PROCEDURE — 36415 COLL VENOUS BLD VENIPUNCTURE: CPT | Performed by: FAMILY MEDICINE

## 2025-05-19 PROCEDURE — 85027 COMPLETE CBC AUTOMATED: CPT | Performed by: FAMILY MEDICINE

## 2025-05-19 PROCEDURE — 80069 RENAL FUNCTION PANEL: CPT | Performed by: FAMILY MEDICINE

## 2025-05-19 NOTE — ASSESSMENT & PLAN NOTE
Was reevaluated by urology and was recommended not to intervene on large kidney stone which appears to be stable and not causing issues.

## 2025-05-19 NOTE — PROGRESS NOTES
Assessment & Plan   Assessment & Plan  Chronic kidney disease, stage 3a (H)  Updated renal panel checked today as well as a hemoglobin.  Orders:    Renal panel (Alb, BUN, Ca, Cl, CO2, Creat, Gluc, Phos, K, Na); Future    CBC with platelets; Future    Pancreatic mass  Biopsy came back negative for malignancy.  I will try to obtain records from [place] [facility] regarding follow-up plans.       Chronic deep vein thrombosis (DVT) of femoral vein of left lower extremity (H)  Continues on Eliquis.       Nephrolithiasis  Was reevaluated by urology and was recommended not to intervene on large kidney stone which appears to be stable and not causing issues.       Epistaxis  Recommended seeing if the increase in humidity in the air now that she is back in [place] will provide relief.  Monitor for now but if the problem continues, recommended ENT consultation.           Rowena Acuna is a 85 year old who presents today for a routine medication check visit.  The patient and her  are recently back from their winter in [place].  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  She has completed physical therapy and is doing well from that standpoint.  She continues on Eliquis due to a history of DVT and a history of thrombus in her atrium.  She is doing well overall with that although did have some issues with nosebleeds in [place].  She has not had any recently.  She states that all but 1 she was able to stop on her own.  The patient did follow-up with urology regarding the large kidney stone and was recommended not to intervene on that.  She notes that she does intermittently  experience some pink-tinged urine but denies any current urinary tract symptoms.  The patient has lost some weight over the past 6 months although states that she has a very good appetite and eats well.  Epistaxis and  "Kidney Stone(s) Followup        5/19/2025     8:58 AM   Additional Questions   Roomed by as   Accompanied by          5/19/2025     8:58 AM   Patient Reported Additional Medications   Patient reports taking the following new medications no         Objective    /76 (BP Location: Left arm, Patient Position: Left side, Cuff Size: Adult Regular)   Pulse 67   Temp 97.4  F (36.3  C) (Oral)   Resp 12   Ht 1.676 m (5' 6\")   Wt 61.2 kg (135 lb)   SpO2 99%   BMI 21.79 kg/m    Body mass index is 21.79 kg/m .  Physical Exam   GENERAL: alert and no distress    The longitudinal plan of care for the diagnosis(es)/condition(s) as documented were addressed during this visit. Due to the added complexity in care, I will continue to support Kalpana in the subsequent management and with ongoing continuity of care.        Signed Electronically by: TANIKA YU MD    "

## 2025-05-19 NOTE — ASSESSMENT & PLAN NOTE
Biopsy came back negative for malignancy.  I will try to obtain records from [place] [facility] regarding follow-up plans.

## 2025-05-19 NOTE — ASSESSMENT & PLAN NOTE
Updated renal panel checked today as well as a hemoglobin.  Orders:    Renal panel (Alb, BUN, Ca, Cl, CO2, Creat, Gluc, Phos, K, Na); Future    CBC with platelets; Future

## 2025-05-20 DIAGNOSIS — N20.0 NEPHROLITHIASIS: Primary | ICD-10-CM

## 2025-05-21 ENCOUNTER — TELEPHONE (OUTPATIENT)
Dept: FAMILY MEDICINE | Facility: CLINIC | Age: 85
End: 2025-05-21
Payer: COMMERCIAL

## 2025-05-21 NOTE — TELEPHONE ENCOUNTER
Call to [facility] to clarify... Dr. Aldana recommended MRI of pancreas in one year due 11/2025 and follow-up with [facility] after. They will reach out to patient to schedule this follow-up appointment.

## 2025-05-21 NOTE — TELEPHONE ENCOUNTER
----- Message from MELA LÓPEZ sent at 5/19/2025 12:32 PM CDT -----  I was looking for some documentation as to whether she needs to follow-up regarding this pancreatic mass that was biopsied and came back benign.  If she still supposed to follow-up with them?  Does she need any repeat imaging?  Is it possible to find out more directly from the care team?  ----- Message -----  From: Katie Saenz  Sent: 5/19/2025  10:20 AM CDT  To: Mela López MD    [facility] has no additional records for patient since biopsy (11/8/24) and biopsy results (11/12/24). [facility] had  instructions for patient to follow-up with Urology, which was completed 11/27/24.  ----- Message -----  From: Mela López MD  Sent: 5/19/2025   9:39 AM CDT  To: [facility] Pool    Can we call [facility] to get updated records regarding follow-up recommendations following her biopsy of her pancreatic mass.

## 2025-05-21 NOTE — TELEPHONE ENCOUNTER
Spoke with patient. RN message relayed and patient verbalized understanding. No additional questions.

## 2025-05-27 ENCOUNTER — RESULTS FOLLOW-UP (OUTPATIENT)
Dept: FAMILY MEDICINE | Facility: CLINIC | Age: 85
End: 2025-05-27

## 2025-05-29 ENCOUNTER — TELEPHONE (OUTPATIENT)
Dept: UROLOGY | Facility: CLINIC | Age: 85
End: 2025-05-29
Payer: COMMERCIAL

## 2025-05-29 NOTE — TELEPHONE ENCOUNTER
Diagnosis: Kidney Stone  Visit Type: Return Kidney Stone  Mode: Either virtual or In person  Provider: Silvia Shaikh (ERAN)  Schedule: Early June  Has the pt been contacted:Yes  Has the chart been reviewed: Yes  Appt notes:  Follow up in early June for kidney stone with CT scan prior.      Message was left with central urology number and imaging scheduling number.

## 2025-05-29 NOTE — TELEPHONE ENCOUNTER
[facility] Call Center    Phone Message    May a detailed message be left on voicemail: no     Reason for Call: Other: calling was told appt needed to be on a Wed am, was offered wed at 1 but said no, please reach out to them     Action Taken: Other: urology    Travel Screening: Not Applicable     Date of Service:

## 2025-05-30 ENCOUNTER — TRANSFERRED RECORDS (OUTPATIENT)
Dept: HEALTH INFORMATION MANAGEMENT | Facility: CLINIC | Age: 85
End: 2025-05-30
Payer: COMMERCIAL

## 2025-06-10 ENCOUNTER — HOSPITAL ENCOUNTER (OUTPATIENT)
Dept: CT IMAGING | Facility: CLINIC | Age: 85
Discharge: HOME | End: 2025-06-10
Attending: NURSE PRACTITIONER
Payer: COMMERCIAL

## 2025-06-10 DIAGNOSIS — N20.0 NEPHROLITHIASIS: ICD-10-CM

## 2025-06-10 PROCEDURE — 74176 CT ABD & PELVIS W/O CONTRAST: CPT

## 2025-06-11 ENCOUNTER — VIRTUAL VISIT (OUTPATIENT)
Dept: UROLOGY | Facility: CLINIC | Age: 85
End: 2025-06-11
Payer: COMMERCIAL

## 2025-06-11 DIAGNOSIS — R31.0 GROSS HEMATURIA: Primary | ICD-10-CM

## 2025-06-11 DIAGNOSIS — N20.0 NEPHROLITHIASIS: ICD-10-CM

## 2025-06-11 PROCEDURE — 1126F AMNT PAIN NOTED NONE PRSNT: CPT | Mod: 95 | Performed by: NURSE PRACTITIONER

## 2025-06-11 PROCEDURE — 98006 SYNCH AUDIO-VIDEO EST MOD 30: CPT | Performed by: NURSE PRACTITIONER

## 2025-06-11 ASSESSMENT — PAIN SCALES - GENERAL: PAINLEVEL_OUTOF10: NO PAIN (0)

## 2025-06-11 NOTE — PATIENT INSTRUCTIONS
UROLOGY CLINIC VISIT PATIENT INSTRUCTIONS    It was a pleasure seeing you today! Thank you for giving us the opportunity to care for you. We hope we provided the excellent service you deserve and look forward to serving you again.    Instructions per today's visit:     CT scan revealed stable position of stone.  Noted slight increase in size by 1 to 2 mm  Due to the blood in the urine that you see while urinating.  We recommend follow-up with a cystoscopy.  We can start with a urine sample at this time  Recommend to give urine sample for urinalysis, urine culture, and urine cytology (looking for any abnormal cells).  Please go to lab to give sample.  If unable to give a sample at the lab, request if you are able to take the supplies home to give urine sample at home to bring back to lab.  If urine sample reveals any abnormalities, would recommend follow-up cystoscopy.  Recommend to return to clinic in 1 year for follow-up.  We will try to obtain CT scans from [facility] in [place], [place]    If you have any issues, questions, or concerns, please don't hesitate to contact us at [facility] at [phone] or via [employer].    Important Contact Information:  -To each our nurse triage line, please call our contact center at [phone].  -Our clinic hours are Monday through Friday, 8:00 a.m. - 4:30 p.m. Feel free to call during these hours with any questions.  -You can also contact us anytime via [employer], and we will respond during clinic hours.      Silvia Shaikh CNP  Department of Urology

## 2025-06-11 NOTE — PROGRESS NOTES
Urology Video Office Visit    Video-Visit Details    Type of service:  Video Visit    Video Start Time: 1405    Video End Time:1437    Originating Location (pt. Location): Home    Distant Location (provider location):  Off-site     Platform used for Video Visit: avox           Assessment and Plan:     Assessment:85 year old female with a nonobstructing left 7 mm x 9 mm lower pole stone which she has opted for observation at this time.  She also has been having an episode a month of gross hematuria.    Plan:  -Reviewed CT scan with patient and .  Noted stable left nonobstructing lower pole stone.  Noted new right punctate nonobstructing renal stone.  Recommend to continue with observation at this time.  PT amenable to plan.  - Reviewed possible etiologies for gross hematuria.  Gross hematuria could be due to use of Eliquis and large nonobstructing left renal stone.  Discussed recommended workup with cystoscopy and repeat UA/UC and urine cytology.  PT would like to start off with urine sample at this time.  Will message patient of results.  If any abnormal results she would like to follow-up with cystoscopy at this time.  - RTC in 1 year for reevaluation.  She does have annual imaging in [place].  Will have images pushed from [facility] in [place], [place] for review to reduce frequency of CT scans.    Silvia Shaikh CNP  Department of Urology  June 11, 2025    I spent a total of 35 minutes spent on the date of the encounter doing chart review, history and exam, documentation, and further activities as noted above.          Chief Complaint:   Annual stone surveillance         History of Present Illness:    Kalpana Manzo is a pleasant 85 year old female who presents with her  for annual stone surveillance.    Ms. Miller was last seen with urology on 11/27/2024.  She has been doing well since that time.  She notes intermittent gross hematuria which occurs at least once a month.  Denies any  other symptoms with gross hematuria.  Denies any acute signs or symptoms of kidney stones.    We have been observing a left nonobstructing lower pole stone.  CT scan on 6/10/2025 (images personally reviewed) revealed a stable left nonobstructing lower pole stone measuring about 7 mm x 9 mm in size.  No noted left hydronephrosis.  Noted new right punctate nonobstructing renal stone.  No noted right hydronephrosis.    She denies any concerns of recurrent UTIs at this time.  She is currently on vaginal estrogen cream for UTI prevention.      She was hospitalized in late August 2024 for urosepsis s/s to UTI. Urine culture revealed mixed urogenital lloyd however blood cultures positive for e.Coli. CT scan on 08/22/24 (images personally reviewed) revealed a right 2mm nonobstructing renal stone and left 1.6cm left nonobstructing lower pole stone. She continues to be asymptomatic.     This is her first stone. Denies any family history of nephrolithiasis.      Stone Risk Factors: none     She is currently on Eliquis at this time.      She and her  do winter in [place].  They usually will leave in the beginning of November and return at the beginning of May.  During their stay in [place] they do seek medical care at [facility] in [place], [place].  She does follow-up with a urologist in [place].    The patient denies a family history of bladder or kidney malignancies.  The patient is non-smoker.  The patient denies exposure to chemicals/dyes, chronic willoughby, urolithiasis, recurrent UTI, chemotherapy, or pelvic radiation.         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    Basal cell carcinoma of skin     Created by Conversion  Replacement Utility updated for latest IMO load    Chronic kidney disease, stage 3a (H)     Contact dermatitis and other eczema, due to unspecified cause     Created by Conversion     Dermatophytosis of nail     Created by  Conversion     Disorder of bone and cartilage     Created by Conversion  Replacement Utility updated for latest IMO load    DVT (deep venous thrombosis) (H)     Dyslipidemia     Eczema     Hepatitis     Created by Conversion [facility] Annotation: Aug 22 2011  9:[id number] - Mela López: in the 1980's  unspecified  Replacement Utility updated for latest IMO load    Hyperlipidemia     Created by Conversion     Midline cystocele     Nephrolithiasis     Nocturia     Created by Conversion     Osteoarthrosis involving lower leg     Created by Conversion  Replacement Utility updated for latest IMO load    Osteopenia     Overactive bladder     Pancreatic mass     Prediabetes 07/18/2016    Seasonal allergic rhinitis     Swelling of limb     Created by Conversion     Varicose vein of leg             Past Surgical History:     Past Surgical History:   Procedure Laterality Date    ARTHROPLASTY HIP ANTERIOR Right     BIOPSY BREAST Left 01/01/1987    BIOPSY BREAST Right 01/01/1992    BIOPSY OF BREAST, INCISIONAL      Description: Biopsy Breast Open;  Recorded: 05/19/2008;    ESOPHAGOSCOPY, GASTROSCOPY, DUODENOSCOPY (EGD), COMBINED N/A 11/08/2024    Procedure: ENDOSCOPIC ULTRASOUND, UPPER TRACT WITH FINE NEEDLE ASPIRATION;  Surgeon: Pavel Aldana MD;  Location: [facility] REMOVE TONSILS/ADENOIDS,<13 Y/O      Description: Tonsillectomy With Adenoidectomy;  Recorded: 05/19/2008;    REVISE SECONDARY VARICOSITY      Description: Varicose Vein Ligation;  Recorded: 05/19/2008;    TOTAL KNEE ARTHROPLASTY Right 08/01/2008    [facility] APPENDECTOMY      Description: Appendectomy;  Recorded: 05/19/2008;            Medications     Current Outpatient Medications   Medication Sig Dispense Refill    acetaminophen (TYLENOL) 500 MG tablet Take 500-1,000 mg by mouth every 6 hours as needed for mild pain      apixaban ANTICOAGULANT (ELIQUIS) 5 MG tablet Take 1 tablet (5 mg) by mouth 2 times daily 180 tablet 3    cholecalciferol,  vitamin D3, (VITAMIN D3) 2,000 unit Tab Take 1,000 Units by mouth daily      GLUCOSAM HCL/CHONDRO [place]ES A/C/[place] (GLUCOSAMINE-CHONDROITIN COMPLX ORAL) [GLUCOSAM HCL/CHONDRO [place]ONTES A/C/[place] (GLUCOSAMINE-CHONDROITIN COMPLX ORAL)] Take 1 tablet by mouth daily. Tablet      multivitamin capsule [MULTIVITAMIN CAPSULE] Take 1 capsule by mouth daily.      simvastatin (ZOCOR) 10 MG tablet TAKE 1 TABLET (10 MG) BY MOUTH DAILY. 90 tablet 1     No current facility-administered medications for this visit.            Family History:     Family History   Problem Relation Age of Onset    Varicose Veins Mother             Social History:     Social History     Socioeconomic History    Marital status:      Spouse name: Not on file    Number of children: Not on file    Years of education: Not on file    Highest education level: Not on file   Occupational History    Not on file   Tobacco Use    Smoking status: Never    Smokeless tobacco: Never   Vaping Use    Vaping status: Never Used   Substance and Sexual Activity    Alcohol use: Yes     Comment: Alcoholic Drinks/day: rare    Drug use: No    Sexual activity: Not on file   Other Topics Concern    Not on file   Social History Narrative    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      Social Drivers of Health     Financial Resource Strain: Low Risk  (9/23/2024)    Financial Resource Strain     Within the past 12 months, have you or your family members you live with been unable to get utilities (heat, electricity) when it was really needed?: No   Food Insecurity: Low Risk  (9/23/2024)    Food Insecurity     Within the past 12 months, did you worry that your food would run out before you got money to buy more?: No     Within the past 12 months, did the food you bought just not last and you didn t have money to get more?: No   Transportation Needs: Low Risk  (9/23/2024)    Transportation Needs     Within the past 12 months, has lack of transportation kept you from medical appointments, getting your  medicines, non-medical meetings or appointments, work, or from getting things that you need?: No   Physical Activity: Unknown (9/23/2024)    Exercise Vital Sign     Days of Exercise per Week: 2 days     Minutes of Exercise per Session: Not on file   Stress: No Stress Concern Present (9/23/2024)    [employer] [place] [employer] - Occupational Stress Questionnaire     Feeling of Stress : Only a little   Social Connections: Unknown (9/23/2024)    Social Connection and Isolation Panel [NHANES]     Frequency of Communication with Friends and Family: Not on file     Frequency of Social Gatherings with Friends and Family: Twice a week     Attends [employer] Services: Not on file     Active Member of Clubs or Organizations: Not on file     Attends Club or Organization Meetings: Not on file     Marital Status: Not on file   Interpersonal Safety: High Risk (11/8/2024)    Interpersonal Safety     Do you feel physically and emotionally safe where you currently live?: No     Within the past 12 months, have you been hit, slapped, kicked or otherwise physically hurt by someone?: No     Within the past 12 months, have you been humiliated or emotionally abused in other ways by your partner or ex-partner?: No   Housing Stability: Low Risk  (9/23/2024)    Housing Stability     Do you have housing? : Yes     Are you worried about losing your housing?: No            Allergies:   Sulfamethoxazole-trimethoprim and Tetracycline         Review of Systems:  From intake questionnaire   Negative 14 system review except as noted on HPI, nurse's note.         Physical Exam:   General Appearance: Well groomed, hygenic  Eyes: No redness, discharge  Respiratory: No cough, no respiratory distress or labored breathing  Musculoskeletal: Grossly normal, full range of motion in upper extremities, no gross deficits  Skin: No discoloration or apparent rashes  Neurologic - No tremors  Psychiatric - Alert and oriented  The rest of a comprehensive  physical examination is deferred due to video visit restrictions        Labs:    I personally reviewed all applicable laboratory data and went over findings with patient  Significant for:    CBC RESULTS:  Recent Labs   Lab Test 05/19/25  1000 09/13/24  1734 09/05/24  1638 08/23/24  0632   WBC 6.2 5.7 9.9 13.1*   HGB 12.4 11.6* 11.3* 10.7*    232 324 131*        BMP RESULTS:  Recent Labs   Lab Test 05/19/25  1000 09/23/24  0957 09/13/24  1734 09/05/24  1638 08/30/21  1010 07/06/20  0913 08/12/19  0842 08/06/18  0842    142 147* 143   < > 143 145 144   POTASSIUM 4.1 4.2 4.0 3.5   < > 4.1 4.0 4.1   CHLORIDE 108* 107 106 107   < > 106 108* 108*   CO2 24 26 29 23   < > 27 31 27   ANIONGAP 11 9 12 13   < > 10 6 9   GLC 95 82 100* 92   < > 90 93 88   BUN 27.7* 14.3 17.8 16.2   < > 23 19 19   CR 1.16* 1.18* 1.18* 1.13*   < > 0.96 0.93 0.88   GFRESTIMATED 46* 45* 45* 48*   < > 56* 58* >60   GFRESTBLACK  --   --   --   --   --  >60 >60 >60   DAVID 9.6 8.9 8.8 8.8   < > 9.1 9.8 9.6    < > = values in this interval not displayed.       UA RESULTS:   Recent Labs   Lab Test 10/18/24  0749 09/13/24  1637 09/06/24  0600   SG 1.011 1.011 1.015   URINEPH 7.0 7.0 7.0   NITRITE Negative Negative Negative   RBCU >182* >182* 10-25*   WBCU 57* 46* 5-10*       CALCIUM RESULTS  Lab Results   Component Value Date    DAVID 9.6 05/19/2025    DAVID 8.9 09/23/2024    DAVID 8.8 09/13/2024           Imaging:    I personally reviewed all applicable imaging and went over the below findings with patient.    Narrative & Impression   EXAM: CT ABDOMEN PELVIS W/O CONTRAST  LOCATION: [facility]  DATE: 6/10/2025     INDICATION:  Nephrolithiasis  COMPARISON: May 13, 2024  TECHNIQUE: CT scan of the abdomen and pelvis was performed without IV contrast. Multiplanar reformats were obtained. Dose reduction techniques were used.  CONTRAST: None.     FINDINGS:   LOWER CHEST: Unremarkable     HEPATOBILIARY: Cholelithiasis.      PANCREAS: No significant mass, duct dilatation, or inflammatory change.     SPLEEN: Normal.     ADRENAL GLANDS: Normal.     KIDNEYS/BLADDER: There is a punctate right intrarenal calculus. There is a 7 x 9 mm intrarenal calculus in the lower pole of the left kidney, stable. No hydronephrosis.     BOWEL: Diverticulosis of the colon. No acute inflammatory change. No obstruction.      LYMPH NODES: Normal.     VASCULATURE: Moderate atherosclerotic calcifications. No abdominal aortic aneurysm.     PELVIC ORGANS: Unremarkable     MUSCULOSKELETAL: Right hip arthroplasty.                                                                      IMPRESSION:   1.  There is a new punctate right renal calculus. Stable left renal calculus.

## 2025-06-11 NOTE — NURSING NOTE
Current patient location: [address] [place] [id number]    Is the patient currently in the state of [place]? YES    Visit mode: VIDEO    If the visit is dropped, the patient can be reconnected by:VIDEO VISIT: Send to e-mail at: [email or web address]    Will anyone else be joining the visit? NO  (If patient encounters technical issues they should call [phone])    Are changes needed to the allergy or medication list? No    Are refills needed on medications prescribed by this physician? NO    Rooming Documentation:  Questionnaire(s) completed    Reason for visit: CELESTINE LOBOF

## 2025-06-13 ENCOUNTER — LAB (OUTPATIENT)
Dept: LAB | Facility: CLINIC | Age: 85
End: 2025-06-13
Payer: COMMERCIAL

## 2025-06-13 DIAGNOSIS — R31.0 GROSS HEMATURIA: ICD-10-CM

## 2025-06-13 DIAGNOSIS — N20.0 NEPHROLITHIASIS: ICD-10-CM

## 2025-06-13 LAB
ALBUMIN UR-MCNC: 30 MG/DL
APPEARANCE UR: CLEAR
BILIRUB UR QL STRIP: NEGATIVE
COLOR UR AUTO: YELLOW
GLUCOSE UR STRIP-MCNC: NEGATIVE MG/DL
HGB UR QL STRIP: ABNORMAL
HYALINE CASTS #/AREA URNS LPF: ABNORMAL /LPF
KETONES UR STRIP-MCNC: NEGATIVE MG/DL
LEUKOCYTE ESTERASE UR QL STRIP: NEGATIVE
NITRATE UR QL: NEGATIVE
PH UR STRIP: 6 [PH] (ref 5–8)
RBC #/AREA URNS AUTO: ABNORMAL /HPF
SP GR UR STRIP: 1.01 (ref 1–1.03)
SQUAMOUS #/AREA URNS AUTO: ABNORMAL /LPF
TRANS CELLS #/AREA URNS HPF: ABNORMAL /HPF
UROBILINOGEN UR STRIP-ACNC: 0.2 E.U./DL
WBC #/AREA URNS AUTO: ABNORMAL /HPF

## 2025-06-13 PROCEDURE — 81001 URINALYSIS AUTO W/SCOPE: CPT

## 2025-06-13 PROCEDURE — 87086 URINE CULTURE/COLONY COUNT: CPT

## 2025-06-13 PROCEDURE — 88112 CYTOPATH CELL ENHANCE TECH: CPT | Performed by: PATHOLOGY

## 2025-06-15 LAB — BACTERIA UR CULT: NORMAL

## 2025-06-18 LAB
PATH REPORT.COMMENTS IMP SPEC: ABNORMAL
PATH REPORT.COMMENTS IMP SPEC: YES
PATH REPORT.FINAL DX SPEC: ABNORMAL
PATH REPORT.GROSS SPEC: ABNORMAL
PATH REPORT.MICROSCOPIC SPEC OTHER STN: ABNORMAL
PATH REPORT.RELEVANT HX SPEC: ABNORMAL

## 2025-06-25 LAB
PATH REPORT.COMMENTS IMP SPEC: NORMAL
PATH REPORT.FINAL DX SPEC: NORMAL
PATH REPORT.GROSS SPEC: NORMAL
PATH REPORT.MICROSCOPIC SPEC OTHER STN: NORMAL
PATH REPORT.RELEVANT HX SPEC: NORMAL

## 2025-06-26 DIAGNOSIS — I82.512 CHRONIC DEEP VEIN THROMBOSIS (DVT) OF FEMORAL VEIN OF LEFT LOWER EXTREMITY (H): ICD-10-CM

## 2025-06-26 NOTE — TELEPHONE ENCOUNTER
Medication Request  Medication name: apixaban ANTICOAGULANT (ELIQUIS) 5 MG tablet   Requested Pharmacy: [facility]  When was patient last seen for this?:  05/19/2025  Patient offered appointment:  No, patient is scheduled 09/24/2025 with PCP  Okay to leave a detailed message: no

## 2025-07-03 ENCOUNTER — RESULTS FOLLOW-UP (OUTPATIENT)
Dept: UROLOGY | Facility: CLINIC | Age: 85
End: 2025-07-03

## 2025-07-09 ENCOUNTER — VIRTUAL VISIT (OUTPATIENT)
Dept: UROLOGY | Facility: CLINIC | Age: 85
End: 2025-07-09
Payer: COMMERCIAL

## 2025-07-09 DIAGNOSIS — R31.0 GROSS HEMATURIA: Primary | ICD-10-CM

## 2025-07-09 DIAGNOSIS — N20.0 NEPHROLITHIASIS: ICD-10-CM

## 2025-07-09 PROCEDURE — 98005 SYNCH AUDIO-VIDEO EST LOW 20: CPT | Performed by: NURSE PRACTITIONER

## 2025-07-09 PROCEDURE — 1126F AMNT PAIN NOTED NONE PRSNT: CPT | Mod: 95 | Performed by: NURSE PRACTITIONER

## 2025-07-09 NOTE — PROGRESS NOTES
Urology Video Office Visit    Video-Visit Details    Type of service:  Video Visit    Video Start Time: 1121    Video End Time:1142    Originating Location (pt. Location): Home    Distant Location (provider location):  Off-site     Platform used for Video Visit: [employer]           Assessment and Plan:     Assessment:85 year old female with gross hematuria.     Plan:  -Reviewed lab results with patient. Discussed that gross hematuria may likely be from use of Eliquis and large nonobstructing left renal stone. However, would recommend cystoscopy to ensure no other causes of gross hematuria. Pt amenable to plan.   -Will schedule for cystoscopy at earliest avail.   -If having severe flank pain, fevers, chills, nausea, or vomiting please notify Urology clinic or be seen in the ER.     Silvia Shaikh CNP  Department of Urology  July 9, 2025    I spent a total of 20 minutes spent on the date of the encounter doing chart review, history and exam, documentation, and further activities as noted above.          Chief Complaint:   Gross hematuria         History of Present Illness:    Kalpana Manzo is a pleasant 85 year old female who presents for follow up of gross hematuria.     Ms. Manzo was last seen with Urology on 6/11/25 for concerns of gross hematuria. She has a history of a left 9mm nonobstructing renal stone. She has been doing well since she was last seen. Denies any changes to her health.     Urine cytology revealed:  - ATYPICAL UROTHELIAL CELLS PRESENT (BASSAM SYSTEM CATEGORY AUC)  - SMALL NUMBERS OF CHRONIC INFLAMMATORY CELLS  - SMALL TO MODERATE NUMBERS OF INTACT RED BLOOD CELLS  - NEGATIVE FOR CYTOLOGICALLY MALIGNANT CELLS    She notes intermittent gross hematuria which occurs at least once a month.  Denies any other symptoms with gross hematuria.  Denies any acute signs or symptoms of kidney stones.     We have been observing a left nonobstructing lower pole stone.  CT scan on 6/10/2025 (images personally  reviewed) revealed a stable left nonobstructing lower pole stone measuring about 7 mm x 9 mm in size.  No noted left hydronephrosis.  Noted new right punctate nonobstructing renal stone.  No noted right hydronephrosis.     She denies any concerns of recurrent UTIs at this time.  She is currently on vaginal estrogen cream for UTI prevention.      She was hospitalized in late August 2024 for urosepsis s/s to UTI. Urine culture revealed mixed urogenital lloyd however blood cultures positive for e.Coli. CT scan on 08/22/24 (images personally reviewed) revealed a right 2mm nonobstructing renal stone and left 1.6cm left nonobstructing lower pole stone. She continues to be asymptomatic.     This is her first stone. Denies any family history of nephrolithiasis.      Stone Risk Factors: none     She is currently on Eliquis at this time.      She and her  do winter in [place].  They usually will leave in the beginning of November and return at the beginning of May.  During their stay in [place] they do seek medical care at [facility] in [place], [place].  She does follow-up with a urologist in [place].     The patient denies a family history of bladder or kidney malignancies.  The patient is non-smoker.  The patient denies exposure to chemicals/dyes, chronic willoughby, urolithiasis, recurrent UTI, chemotherapy, or pelvic radiation.      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 Annotation: Aug 22 2011  9:09AM - Mela López: in the 1980's  unspecified  Replacement Utility updated for latest IMO load    Hyperlipidemia     Created by Conversion     Midline cystocele     Nephrolithiasis     Nocturia     Created by Conversion     Osteoarthrosis involving lower leg     Created by Conversion  Replacement Utility updated for latest IMO load    Osteopenia     Overactive bladder     Pancreatic mass     Prediabetes 07/18/2016    Seasonal allergic rhinitis     Swelling of limb     Created by Conversion     Varicose vein of leg             Past Surgical History:     Past Surgical History:   Procedure Laterality Date    ARTHROPLASTY HIP ANTERIOR Right     BIOPSY BREAST Left 01/01/1987    BIOPSY BREAST Right 01/01/1992    BIOPSY OF BREAST, INCISIONAL      Description: Biopsy Breast Open;  Recorded: 05/19/2008;    ESOPHAGOSCOPY, GASTROSCOPY, DUODENOSCOPY (EGD), COMBINED N/A 11/08/2024    Procedure: ENDOSCOPIC ULTRASOUND, UPPER TRACT WITH FINE NEEDLE ASPIRATION;  Surgeon: Pavel Aldana MD;  Location: [facility] [place], [place] OR     REMOVE TONSILS/ADENOIDS,<13 Y/O      Description: Tonsillectomy With Adenoidectomy;  Recorded: 05/19/2008;    REVISE SECONDARY VARICOSITY      Description: Varicose Vein Ligation;  Recorded: 05/19/2008;    TOTAL KNEE ARTHROPLASTY Right 08/01/2008    [facility] APPENDECTOMY      Description: Appendectomy;  Recorded: 05/19/2008;            Medications     Current Outpatient Medications   Medication Sig Dispense Refill    acetaminophen (TYLENOL) 500 MG tablet Take 500-1,000 mg by mouth every 6 hours as needed for mild pain      apixaban ANTICOAGULANT (ELIQUIS) 5 MG tablet Take 1 tablet (5 mg) by mouth 2 times daily. 180 tablet 3    cholecalciferol, vitamin D3, (VITAMIN D3) 2,000 unit Tab Take 1,000 Units by mouth daily      GLUCOSAM HCL/CHONDRO MONTES A/C/[place] (GLUCOSAMINE-CHONDROITIN COMPLX ORAL)  [GLUCOSAM HCL/CHONDRO MONTES A/C/[place] (GLUCOSAMINE-CHONDROITIN COMPLX ORAL)] Take 1 tablet by mouth daily. Tablet      multivitamin capsule [MULTIVITAMIN CAPSULE] Take 1 capsule by mouth daily.      simvastatin (ZOCOR) 10 MG tablet TAKE 1 TABLET (10 MG) BY MOUTH DAILY. 90 tablet 1     No current facility-administered medications for this visit.            Family History:     Family History   Problem Relation Age of Onset    Varicose Veins Mother             Social History:     Social History     Socioeconomic History    Marital status:      Spouse name: Not on file    Number of children: Not on file    Years of education: Not on file    Highest education level: Not on file   Occupational History    Not on file   Tobacco Use    Smoking status: Never    Smokeless tobacco: Never   Vaping Use    Vaping status: Never Used   Substance and Sexual Activity    Alcohol use: Yes     Comment: Alcoholic Drinks/day: rare    Drug use: No    Sexual activity: Not on file   Other Topics Concern    Not on file   Social History Narrative    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      Social Drivers of Health     Financial Resource Strain: Low Risk  (9/23/2024)    Financial Resource Strain     Within the past 12 months, have you or your family members you live with been unable to get utilities (heat, electricity) when it was really needed?: No   Food Insecurity: Low Risk  (9/23/2024)    Food Insecurity     Within the past 12 months, did you worry that your food would run out before you got money to buy more?: No     Within the past 12 months, did the food you bought just not last and you didn t have money to get more?: No   Transportation Needs: Low Risk  (9/23/2024)    Transportation Needs     Within the past 12 months, has lack of transportation kept you from medical appointments, getting your medicines, non-medical meetings or appointments, work, or from getting things that you need?: No   Physical Activity: Unknown (9/23/2024)    Exercise  Vital Sign     Days of Exercise per Week: 2 days     Minutes of Exercise per Session: Not on file   Stress: No Stress Concern Present (9/23/2024)    [place] [employer] - Occupational Stress Questionnaire     Feeling of Stress : Only a little   Social Connections: Unknown (9/23/2024)    Social Connection and Isolation Panel [NHANES]     Frequency of Communication with Friends and Family: Not on file     Frequency of Social Gatherings with Friends and Family: Twice a week     Attends Baptism Services: Not on file     Active Member of Clubs or Organizations: Not on file     Attends Club or Organization Meetings: Not on file     Marital Status: Not on file   Interpersonal Safety: High Risk (11/8/2024)    Interpersonal Safety     Do you feel physically and emotionally safe where you currently live?: No     Within the past 12 months, have you been hit, slapped, kicked or otherwise physically hurt by someone?: No     Within the past 12 months, have you been humiliated or emotionally abused in other ways by your partner or ex-partner?: No   Housing Stability: Low Risk  (9/23/2024)    Housing Stability     Do you have housing? : Yes     Are you worried about losing your housing?: No            Allergies:   Sulfamethoxazole-trimethoprim and Tetracycline         Review of Systems:  From intake questionnaire   Negative 14 system review except as noted on HPI, nurse's note.         Physical Exam:   General Appearance: Well groomed, hygenic  Eyes: No redness, discharge  Respiratory: No cough, no respiratory distress or labored breathing  Musculoskeletal: Grossly normal, full range of motion in upper extremities, no gross deficits  Skin: No discoloration or apparent rashes  Neurologic - No tremors  Psychiatric - Alert and oriented  The rest of a comprehensive physical examination is deferred due to video visit restrictions        Labs:    I personally reviewed all applicable laboratory data and went over  findings with patient  Significant for:    CBC RESULTS:  Recent Labs   Lab Test 05/19/25  1000 09/13/24  1734 09/05/24  1638 08/23/24  0632   WBC 6.2 5.7 9.9 13.1*   HGB 12.4 11.6* 11.3* 10.7*    232 324 131*        BMP RESULTS:  Recent Labs   Lab Test 05/19/25  1000 09/23/24  0957 09/13/24  1734 09/05/24  1638 08/30/21  1010 07/06/20  0913 08/12/19  0842 08/06/18  0842    142 147* 143   < > 143 145 144   POTASSIUM 4.1 4.2 4.0 3.5   < > 4.1 4.0 4.1   CHLORIDE 108* 107 106 107   < > 106 108* 108*   CO2 24 26 29 23   < > 27 31 27   ANIONGAP 11 9 12 13   < > 10 6 9   GLC 95 82 100* 92   < > 90 93 88   BUN 27.7* 14.3 17.8 16.2   < > 23 19 19   CR 1.16* 1.18* 1.18* 1.13*   < > 0.96 0.93 0.88   GFRESTIMATED 46* 45* 45* 48*   < > 56* 58* >60   GFRESTBLACK  --   --   --   --   --  >60 >60 >60   DAVID 9.6 8.9 8.8 8.8   < > 9.1 9.8 9.6    < > = values in this interval not displayed.       UA RESULTS:   Recent Labs   Lab Test 06/13/25  1335 10/18/24  0749 09/13/24  1637   SG 1.015 1.011 1.011   URINEPH 6.0 7.0 7.0   NITRITE Negative Negative Negative   RBCU 25-50* >182* >182*   WBCU 0-5 57* 46*       CALCIUM RESULTS  Lab Results   Component Value Date    DAVID 9.6 05/19/2025    DAVID 8.9 09/23/2024    DAVID 8.8 09/13/2024           Imaging:    I personally reviewed all applicable imaging and went over the below findings with patient.    Results for orders placed or performed during the hospital encounter of 06/10/25   CT Abdomen Pelvis w/o Contrast [[id number]]    Narrative    EXAM: CT ABDOMEN PELVIS W/O CONTRAST  LOCATION: [facility]  DATE: 6/10/2025    INDICATION:  Nephrolithiasis  COMPARISON: May 13, 2024  TECHNIQUE: CT scan of the abdomen and pelvis was performed without IV contrast. Multiplanar reformats were obtained. Dose reduction techniques were used.  CONTRAST: None.    FINDINGS:   LOWER CHEST: Unremarkable    HEPATOBILIARY: Cholelithiasis.    PANCREAS: No significant mass, duct dilatation,  or inflammatory change.    SPLEEN: Normal.    ADRENAL GLANDS: Normal.    KIDNEYS/BLADDER: There is a punctate right intrarenal calculus. There is a 7 x 9 mm intrarenal calculus in the lower pole of the left kidney, stable. No hydronephrosis.    BOWEL: Diverticulosis of the colon. No acute inflammatory change. No obstruction.     LYMPH NODES: Normal.    VASCULATURE: Moderate atherosclerotic calcifications. No abdominal aortic aneurysm.    PELVIC ORGANS: Unremarkable    MUSCULOSKELETAL: Right hip arthroplasty.      Impression    IMPRESSION:   1.  There is a new punctate right renal calculus. Stable left renal calculus.

## 2025-07-09 NOTE — PATIENT INSTRUCTIONS
UROLOGY CLINIC VISIT PATIENT INSTRUCTIONS    It was a pleasure seeing you today! Thank you for giving us the opportunity to care for you. We hope we provided the excellent service you deserve and look forward to serving you again.      If you have any issues, questions, or concerns, please don't hesitate to contact us at [facility] at [phone] or via iPrint.    Important Contact Information:  -To each our nurse triage line, please call our contact center at [phone].  -Our clinic hours are Monday through Friday, 8:00 a.m. - 4:30 p.m. Feel free to call during these hours with any questions.  -You can also contact us anytime via iPrint, and we will respond during clinic hours.      Silvia Shaikh CNP  Department of Urology

## 2025-07-09 NOTE — NURSING NOTE
Current patient location: [place]    Is the patient currently in the state of [place]? YES    Visit mode: VIDEO    If the visit is dropped, the patient can be reconnected by:VIDEO VISIT: Text to cell phone:   Telephone Information:   Mobile [phone]       Will anyone else be joining the visit? NO  (If patient encounters technical issues they should call [phone])    Are changes needed to the allergy or medication list? No    Are refills needed on medications prescribed by this physician? NO    Rooming Documentation:  Questionnaire(s) completed    Reason for visit: RECHECK    Joana PERSON

## 2025-07-15 ENCOUNTER — PATIENT OUTREACH (OUTPATIENT)
Dept: CARE COORDINATION | Facility: CLINIC | Age: 85
End: 2025-07-15
Payer: COMMERCIAL

## 2025-08-12 ENCOUNTER — PATIENT OUTREACH (OUTPATIENT)
Dept: CARE COORDINATION | Facility: CLINIC | Age: 85
End: 2025-08-12
Payer: COMMERCIAL

## (undated) DEVICE — ENDO NDL ASPIRATION 22GA SLIMLINE EXPECT EUS M00555510

## (undated) DEVICE — GUIDEWIRE ENDOSCOPIC CLEANGUIDE POLY 4 X80CM ESOPHA DIS150